# Patient Record
Sex: FEMALE | Race: WHITE | NOT HISPANIC OR LATINO | Employment: OTHER | URBAN - METROPOLITAN AREA
[De-identification: names, ages, dates, MRNs, and addresses within clinical notes are randomized per-mention and may not be internally consistent; named-entity substitution may affect disease eponyms.]

---

## 2024-01-16 ENCOUNTER — APPOINTMENT (OUTPATIENT)
Dept: CT IMAGING | Facility: HOSPITAL | Age: 59
DRG: 020 | End: 2024-01-16
Payer: COMMERCIAL

## 2024-01-16 ENCOUNTER — ANESTHESIA EVENT (OUTPATIENT)
Dept: PERIOP | Facility: HOSPITAL | Age: 59
End: 2024-01-16
Payer: COMMERCIAL

## 2024-01-16 ENCOUNTER — HOSPITAL ENCOUNTER (INPATIENT)
Facility: HOSPITAL | Age: 59
LOS: 10 days | Discharge: HOME | DRG: 020 | End: 2024-01-26
Attending: STUDENT IN AN ORGANIZED HEALTH CARE EDUCATION/TRAINING PROGRAM | Admitting: INTERNAL MEDICINE
Payer: COMMERCIAL

## 2024-01-16 ENCOUNTER — ANESTHESIA (OUTPATIENT)
Dept: PERIOP | Facility: HOSPITAL | Age: 59
End: 2024-01-16
Payer: COMMERCIAL

## 2024-01-16 ENCOUNTER — APPOINTMENT (OUTPATIENT)
Dept: GENERAL RADIOLOGY | Facility: HOSPITAL | Age: 59
DRG: 020 | End: 2024-01-16
Payer: COMMERCIAL

## 2024-01-16 DIAGNOSIS — I60.2: ICD-10-CM

## 2024-01-16 DIAGNOSIS — I60.9 SAH (SUBARACHNOID HEMORRHAGE): Primary | ICD-10-CM

## 2024-01-16 DIAGNOSIS — G91.0 COMMUNICATING HYDROCEPHALUS: ICD-10-CM

## 2024-01-16 LAB
ALBUMIN SERPL-MCNC: 4.3 G/DL (ref 3.5–5.2)
ALBUMIN/GLOB SERPL: 1.3 G/DL
ALP SERPL-CCNC: 102 U/L (ref 39–117)
ALT SERPL W P-5'-P-CCNC: 39 U/L (ref 1–33)
ANION GAP SERPL CALCULATED.3IONS-SCNC: 18 MMOL/L (ref 5–15)
APTT PPP: 29.8 SECONDS (ref 22.7–35.4)
AST SERPL-CCNC: 17 U/L (ref 1–32)
BASOPHILS # BLD AUTO: 0.02 10*3/MM3 (ref 0–0.2)
BASOPHILS NFR BLD AUTO: 0.2 % (ref 0–1.5)
BILIRUB SERPL-MCNC: 0.3 MG/DL (ref 0–1.2)
BUN SERPL-MCNC: 20 MG/DL (ref 6–20)
BUN/CREAT SERPL: 23 (ref 7–25)
CALCIUM SPEC-SCNC: 10.3 MG/DL (ref 8.6–10.5)
CHLORIDE SERPL-SCNC: 98 MMOL/L (ref 98–107)
CO2 SERPL-SCNC: 22 MMOL/L (ref 22–29)
CREAT SERPL-MCNC: 0.87 MG/DL (ref 0.57–1)
DEPRECATED RDW RBC AUTO: 37.6 FL (ref 37–54)
EGFRCR SERPLBLD CKD-EPI 2021: 76.9 ML/MIN/1.73
EOSINOPHIL # BLD AUTO: 0 10*3/MM3 (ref 0–0.4)
EOSINOPHIL NFR BLD AUTO: 0 % (ref 0.3–6.2)
ERYTHROCYTE [DISTWIDTH] IN BLOOD BY AUTOMATED COUNT: 12.4 % (ref 12.3–15.4)
GLOBULIN UR ELPH-MCNC: 3.3 GM/DL
GLUCOSE BLDC GLUCOMTR-MCNC: 115 MG/DL (ref 70–130)
GLUCOSE SERPL-MCNC: 112 MG/DL (ref 65–99)
HCT VFR BLD AUTO: 47.2 % (ref 34–46.6)
HGB BLD-MCNC: 15.7 G/DL (ref 12–15.9)
IMM GRANULOCYTES # BLD AUTO: 0.05 10*3/MM3 (ref 0–0.05)
IMM GRANULOCYTES NFR BLD AUTO: 0.4 % (ref 0–0.5)
INR PPP: 1.09 (ref 0.9–1.1)
LYMPHOCYTES # BLD AUTO: 1.3 10*3/MM3 (ref 0.7–3.1)
LYMPHOCYTES NFR BLD AUTO: 10.6 % (ref 19.6–45.3)
MCH RBC QN AUTO: 27.9 PG (ref 26.6–33)
MCHC RBC AUTO-ENTMCNC: 33.3 G/DL (ref 31.5–35.7)
MCV RBC AUTO: 83.8 FL (ref 79–97)
MONOCYTES # BLD AUTO: 0.59 10*3/MM3 (ref 0.1–0.9)
MONOCYTES NFR BLD AUTO: 4.8 % (ref 5–12)
NEUTROPHILS NFR BLD AUTO: 10.34 10*3/MM3 (ref 1.7–7)
NEUTROPHILS NFR BLD AUTO: 84 % (ref 42.7–76)
NRBC BLD AUTO-RTO: 0 /100 WBC (ref 0–0.2)
PLATELET # BLD AUTO: 373 10*3/MM3 (ref 140–450)
PMV BLD AUTO: 8.8 FL (ref 6–12)
POTASSIUM SERPL-SCNC: 3.4 MMOL/L (ref 3.5–5.2)
PROT SERPL-MCNC: 7.6 G/DL (ref 6–8.5)
PROTHROMBIN TIME: 14.2 SECONDS (ref 11.7–14.2)
RBC # BLD AUTO: 5.63 10*6/MM3 (ref 3.77–5.28)
SODIUM SERPL-SCNC: 138 MMOL/L (ref 136–145)
TROPONIN T SERPL HS-MCNC: 34 NG/L
WBC NRBC COR # BLD AUTO: 12.3 10*3/MM3 (ref 3.4–10.8)

## 2024-01-16 PROCEDURE — C1769 GUIDE WIRE: HCPCS | Performed by: RADIOLOGY

## 2024-01-16 PROCEDURE — 25010000002 PROPOFOL 10 MG/ML EMULSION: Performed by: STUDENT IN AN ORGANIZED HEALTH CARE EDUCATION/TRAINING PROGRAM

## 2024-01-16 PROCEDURE — 85025 COMPLETE CBC W/AUTO DIFF WBC: CPT | Performed by: STUDENT IN AN ORGANIZED HEALTH CARE EDUCATION/TRAINING PROGRAM

## 2024-01-16 PROCEDURE — 36415 COLL VENOUS BLD VENIPUNCTURE: CPT

## 2024-01-16 PROCEDURE — C1887 CATHETER, GUIDING: HCPCS | Performed by: RADIOLOGY

## 2024-01-16 PROCEDURE — 99291 CRITICAL CARE FIRST HOUR: CPT

## 2024-01-16 PROCEDURE — 93010 ELECTROCARDIOGRAM REPORT: CPT | Performed by: INTERNAL MEDICINE

## 2024-01-16 PROCEDURE — 25010000002 ONDANSETRON PER 1 MG: Performed by: STUDENT IN AN ORGANIZED HEALTH CARE EDUCATION/TRAINING PROGRAM

## 2024-01-16 PROCEDURE — 25010000002 DEXAMETHASONE SODIUM PHOSPHATE 20 MG/5ML SOLUTION: Performed by: STUDENT IN AN ORGANIZED HEALTH CARE EDUCATION/TRAINING PROGRAM

## 2024-01-16 PROCEDURE — 85730 THROMBOPLASTIN TIME PARTIAL: CPT | Performed by: STUDENT IN AN ORGANIZED HEALTH CARE EDUCATION/TRAINING PROGRAM

## 2024-01-16 PROCEDURE — 99223 1ST HOSP IP/OBS HIGH 75: CPT | Performed by: NURSE PRACTITIONER

## 2024-01-16 PROCEDURE — 0 IODIXANOL PER 1 ML: Performed by: RADIOLOGY

## 2024-01-16 PROCEDURE — 25010000002 HEPARIN (PORCINE) PER 1000 UNITS: Performed by: RADIOLOGY

## 2024-01-16 PROCEDURE — 75898 FOLLOW-UP ANGIOGRAPHY: CPT | Performed by: RADIOLOGY

## 2024-01-16 PROCEDURE — 25010000002 LEVETRIRACETAM PER 10 MG: Performed by: INTERNAL MEDICINE

## 2024-01-16 PROCEDURE — 03VG3DZ RESTRICTION OF INTRACRANIAL ARTERY WITH INTRALUMINAL DEVICE, PERCUTANEOUS APPROACH: ICD-10-PCS | Performed by: RADIOLOGY

## 2024-01-16 PROCEDURE — 70450 CT HEAD/BRAIN W/O DYE: CPT

## 2024-01-16 PROCEDURE — C1889 IMPLANT/INSERT DEVICE, NOC: HCPCS | Performed by: RADIOLOGY

## 2024-01-16 PROCEDURE — 93005 ELECTROCARDIOGRAM TRACING: CPT

## 2024-01-16 PROCEDURE — 75894 X-RAYS TRANSCATH THERAPY: CPT | Performed by: RADIOLOGY

## 2024-01-16 PROCEDURE — 36224 PLACE CATH CAROTD ART: CPT | Performed by: RADIOLOGY

## 2024-01-16 PROCEDURE — 25010000002 PHENYLEPHRINE 10 MG/ML SOLUTION 5 ML VIAL: Performed by: STUDENT IN AN ORGANIZED HEALTH CARE EDUCATION/TRAINING PROGRAM

## 2024-01-16 PROCEDURE — 72125 CT NECK SPINE W/O DYE: CPT

## 2024-01-16 PROCEDURE — 82948 REAGENT STRIP/BLOOD GLUCOSE: CPT

## 2024-01-16 PROCEDURE — 25010000002 NICARDIPINE 2.5 MG/ML SOLUTION: Performed by: INTERNAL MEDICINE

## 2024-01-16 PROCEDURE — 61624 TCAT PERM OCCLS/EMBOLJ CNS: CPT | Performed by: RADIOLOGY

## 2024-01-16 PROCEDURE — 70498 CT ANGIOGRAPHY NECK: CPT

## 2024-01-16 PROCEDURE — 70496 CT ANGIOGRAPHY HEAD: CPT

## 2024-01-16 PROCEDURE — 84484 ASSAY OF TROPONIN QUANT: CPT | Performed by: STUDENT IN AN ORGANIZED HEALTH CARE EDUCATION/TRAINING PROGRAM

## 2024-01-16 PROCEDURE — 25810000003 SODIUM CHLORIDE 0.9 % SOLUTION: Performed by: STUDENT IN AN ORGANIZED HEALTH CARE EDUCATION/TRAINING PROGRAM

## 2024-01-16 PROCEDURE — 25010000002 LEVETRIRACETAM PER 10 MG: Performed by: NURSE PRACTITIONER

## 2024-01-16 PROCEDURE — 76377 3D RENDER W/INTRP POSTPROCES: CPT | Performed by: RADIOLOGY

## 2024-01-16 PROCEDURE — 25010000002 NICARDIPINE 2.5 MG/ML SOLUTION: Performed by: STUDENT IN AN ORGANIZED HEALTH CARE EDUCATION/TRAINING PROGRAM

## 2024-01-16 PROCEDURE — C1894 INTRO/SHEATH, NON-LASER: HCPCS | Performed by: RADIOLOGY

## 2024-01-16 PROCEDURE — 36226 PLACE CATH VERTEBRAL ART: CPT | Performed by: RADIOLOGY

## 2024-01-16 PROCEDURE — 25010000002 KETOROLAC TROMETHAMINE PER 15 MG: Performed by: STUDENT IN AN ORGANIZED HEALTH CARE EDUCATION/TRAINING PROGRAM

## 2024-01-16 PROCEDURE — 80053 COMPREHEN METABOLIC PANEL: CPT | Performed by: STUDENT IN AN ORGANIZED HEALTH CARE EDUCATION/TRAINING PROGRAM

## 2024-01-16 PROCEDURE — 25810000003 SODIUM CHLORIDE 0.9 % SOLUTION: Performed by: INTERNAL MEDICINE

## 2024-01-16 PROCEDURE — 70486 CT MAXILLOFACIAL W/O DYE: CPT

## 2024-01-16 PROCEDURE — 25510000001 IOPAMIDOL PER 1 ML: Performed by: INTERNAL MEDICINE

## 2024-01-16 PROCEDURE — 25010000002 SUGAMMADEX 200 MG/2ML SOLUTION: Performed by: STUDENT IN AN ORGANIZED HEALTH CARE EDUCATION/TRAINING PROGRAM

## 2024-01-16 PROCEDURE — 85610 PROTHROMBIN TIME: CPT | Performed by: STUDENT IN AN ORGANIZED HEALTH CARE EDUCATION/TRAINING PROGRAM

## 2024-01-16 PROCEDURE — C1760 CLOSURE DEV, VASC: HCPCS | Performed by: RADIOLOGY

## 2024-01-16 PROCEDURE — 25810000003 SODIUM CHLORIDE 0.9 % SOLUTION 250 ML FLEX CONT: Performed by: STUDENT IN AN ORGANIZED HEALTH CARE EDUCATION/TRAINING PROGRAM

## 2024-01-16 DEVICE — IMPLANTABLE DEVICE: Type: IMPLANTABLE DEVICE | Site: BRAIN | Status: FUNCTIONAL

## 2024-01-16 RX ORDER — ACETAMINOPHEN 650 MG/1
325 SUPPOSITORY RECTAL EVERY 4 HOURS PRN
Status: DISCONTINUED | OUTPATIENT
Start: 2024-01-16 | End: 2024-01-26 | Stop reason: HOSPADM

## 2024-01-16 RX ORDER — LISINOPRIL AND HYDROCHLOROTHIAZIDE 25; 20 MG/1; MG/1
1 TABLET ORAL NIGHTLY
COMMUNITY
End: 2024-01-26 | Stop reason: HOSPADM

## 2024-01-16 RX ORDER — MELATONIN
1000 DAILY
COMMUNITY

## 2024-01-16 RX ORDER — DROPERIDOL 2.5 MG/ML
0.62 INJECTION, SOLUTION INTRAMUSCULAR; INTRAVENOUS
Status: DISCONTINUED | OUTPATIENT
Start: 2024-01-16 | End: 2024-01-16 | Stop reason: HOSPADM

## 2024-01-16 RX ORDER — PROMETHAZINE HYDROCHLORIDE 25 MG/1
25 SUPPOSITORY RECTAL ONCE AS NEEDED
Status: DISCONTINUED | OUTPATIENT
Start: 2024-01-16 | End: 2024-01-16 | Stop reason: HOSPADM

## 2024-01-16 RX ORDER — SODIUM CHLORIDE 0.9 % (FLUSH) 0.9 %
10 SYRINGE (ML) INJECTION AS NEEDED
Status: DISCONTINUED | OUTPATIENT
Start: 2024-01-16 | End: 2024-01-26 | Stop reason: HOSPADM

## 2024-01-16 RX ORDER — HYDROCODONE BITARTRATE AND ACETAMINOPHEN 7.5; 325 MG/1; MG/1
1 TABLET ORAL EVERY 4 HOURS PRN
Status: DISCONTINUED | OUTPATIENT
Start: 2024-01-16 | End: 2024-01-16 | Stop reason: HOSPADM

## 2024-01-16 RX ORDER — AMOXICILLIN 250 MG
2 CAPSULE ORAL 2 TIMES DAILY
Status: DISCONTINUED | OUTPATIENT
Start: 2024-01-16 | End: 2024-01-26 | Stop reason: HOSPADM

## 2024-01-16 RX ORDER — KETOROLAC TROMETHAMINE 15 MG/ML
15 INJECTION, SOLUTION INTRAMUSCULAR; INTRAVENOUS ONCE
Status: COMPLETED | OUTPATIENT
Start: 2024-01-16 | End: 2024-01-16

## 2024-01-16 RX ORDER — ACETAMINOPHEN 500 MG
1000 TABLET ORAL ONCE
Status: COMPLETED | OUTPATIENT
Start: 2024-01-16 | End: 2024-01-16

## 2024-01-16 RX ORDER — CETIRIZINE HYDROCHLORIDE 5 MG/1
5 TABLET ORAL DAILY
COMMUNITY

## 2024-01-16 RX ORDER — ATORVASTATIN CALCIUM 20 MG/1
20 TABLET, FILM COATED ORAL NIGHTLY
Qty: 60 TABLET | Refills: 0 | Status: DISCONTINUED | OUTPATIENT
Start: 2024-01-16 | End: 2024-01-26 | Stop reason: HOSPADM

## 2024-01-16 RX ORDER — PROMETHAZINE HYDROCHLORIDE 25 MG/1
25 TABLET ORAL ONCE AS NEEDED
Status: DISCONTINUED | OUTPATIENT
Start: 2024-01-16 | End: 2024-01-16 | Stop reason: HOSPADM

## 2024-01-16 RX ORDER — ONDANSETRON 2 MG/ML
4 INJECTION INTRAMUSCULAR; INTRAVENOUS ONCE AS NEEDED
Status: DISCONTINUED | OUTPATIENT
Start: 2024-01-16 | End: 2024-01-16 | Stop reason: HOSPADM

## 2024-01-16 RX ORDER — FENTANYL CITRATE 50 UG/ML
25 INJECTION, SOLUTION INTRAMUSCULAR; INTRAVENOUS
Status: DISCONTINUED | OUTPATIENT
Start: 2024-01-16 | End: 2024-01-16 | Stop reason: HOSPADM

## 2024-01-16 RX ORDER — SODIUM CHLORIDE 0.9 % (FLUSH) 0.9 %
10 SYRINGE (ML) INJECTION EVERY 12 HOURS SCHEDULED
Status: DISCONTINUED | OUTPATIENT
Start: 2024-01-16 | End: 2024-01-26 | Stop reason: HOSPADM

## 2024-01-16 RX ORDER — BISACODYL 5 MG/1
5 TABLET, DELAYED RELEASE ORAL DAILY PRN
Status: DISCONTINUED | OUTPATIENT
Start: 2024-01-16 | End: 2024-01-26 | Stop reason: HOSPADM

## 2024-01-16 RX ORDER — NIMODIPINE 30 MG/1
60 CAPSULE, LIQUID FILLED ORAL
Status: DISCONTINUED | OUTPATIENT
Start: 2024-01-16 | End: 2024-01-23

## 2024-01-16 RX ORDER — PHENYLEPHRINE HCL IN 0.9% NACL 1 MG/10 ML
SYRINGE (ML) INTRAVENOUS AS NEEDED
Status: DISCONTINUED | OUTPATIENT
Start: 2024-01-16 | End: 2024-01-16 | Stop reason: SURG

## 2024-01-16 RX ORDER — LEVETIRACETAM 500 MG/5ML
500 INJECTION, SOLUTION, CONCENTRATE INTRAVENOUS ONCE
Status: COMPLETED | OUTPATIENT
Start: 2024-01-16 | End: 2024-01-16

## 2024-01-16 RX ORDER — POLYETHYLENE GLYCOL 3350 17 G/17G
17 POWDER, FOR SOLUTION ORAL DAILY PRN
Status: DISCONTINUED | OUTPATIENT
Start: 2024-01-16 | End: 2024-01-26 | Stop reason: HOSPADM

## 2024-01-16 RX ORDER — ONDANSETRON 2 MG/ML
4 INJECTION INTRAMUSCULAR; INTRAVENOUS EVERY 6 HOURS PRN
Status: DISCONTINUED | OUTPATIENT
Start: 2024-01-16 | End: 2024-01-26 | Stop reason: HOSPADM

## 2024-01-16 RX ORDER — BISACODYL 10 MG
10 SUPPOSITORY, RECTAL RECTAL DAILY PRN
Status: DISCONTINUED | OUTPATIENT
Start: 2024-01-16 | End: 2024-01-26 | Stop reason: HOSPADM

## 2024-01-16 RX ORDER — SODIUM CHLORIDE 9 MG/ML
40 INJECTION, SOLUTION INTRAVENOUS AS NEEDED
Status: DISCONTINUED | OUTPATIENT
Start: 2024-01-16 | End: 2024-01-26 | Stop reason: HOSPADM

## 2024-01-16 RX ORDER — AMLODIPINE BESYLATE 5 MG/1
5 TABLET ORAL NIGHTLY
COMMUNITY
End: 2024-01-26 | Stop reason: HOSPADM

## 2024-01-16 RX ORDER — IPRATROPIUM BROMIDE AND ALBUTEROL SULFATE 2.5; .5 MG/3ML; MG/3ML
3 SOLUTION RESPIRATORY (INHALATION) ONCE AS NEEDED
Status: DISCONTINUED | OUTPATIENT
Start: 2024-01-16 | End: 2024-01-16 | Stop reason: HOSPADM

## 2024-01-16 RX ORDER — HYDRALAZINE HYDROCHLORIDE 20 MG/ML
5 INJECTION INTRAMUSCULAR; INTRAVENOUS
Status: DISCONTINUED | OUTPATIENT
Start: 2024-01-16 | End: 2024-01-16 | Stop reason: HOSPADM

## 2024-01-16 RX ORDER — UBIDECARENONE 75 MG
50 CAPSULE ORAL DAILY
COMMUNITY

## 2024-01-16 RX ORDER — ASPIRIN 81 MG/1
81 TABLET ORAL DAILY
Status: DISCONTINUED | OUTPATIENT
Start: 2024-01-16 | End: 2024-01-26 | Stop reason: HOSPADM

## 2024-01-16 RX ORDER — PROPOFOL 10 MG/ML
VIAL (ML) INTRAVENOUS AS NEEDED
Status: DISCONTINUED | OUTPATIENT
Start: 2024-01-16 | End: 2024-01-16 | Stop reason: SURG

## 2024-01-16 RX ORDER — EPHEDRINE SULFATE 50 MG/ML
5 INJECTION, SOLUTION INTRAVENOUS ONCE AS NEEDED
Status: DISCONTINUED | OUTPATIENT
Start: 2024-01-16 | End: 2024-01-16 | Stop reason: HOSPADM

## 2024-01-16 RX ORDER — ACETAMINOPHEN 325 MG/1
650 TABLET ORAL EVERY 4 HOURS PRN
Status: DISCONTINUED | OUTPATIENT
Start: 2024-01-16 | End: 2024-01-26 | Stop reason: HOSPADM

## 2024-01-16 RX ORDER — HYDROCODONE BITARTRATE AND ACETAMINOPHEN 5; 325 MG/1; MG/1
1 TABLET ORAL ONCE AS NEEDED
Status: DISCONTINUED | OUTPATIENT
Start: 2024-01-16 | End: 2024-01-16 | Stop reason: HOSPADM

## 2024-01-16 RX ORDER — IODIXANOL 320 MG/ML
300 INJECTION, SOLUTION INTRAVASCULAR
Status: COMPLETED | OUTPATIENT
Start: 2024-01-16 | End: 2024-01-16

## 2024-01-16 RX ORDER — NALOXONE HCL 0.4 MG/ML
0.2 VIAL (ML) INJECTION AS NEEDED
Status: DISCONTINUED | OUTPATIENT
Start: 2024-01-16 | End: 2024-01-16 | Stop reason: HOSPADM

## 2024-01-16 RX ORDER — ROCURONIUM BROMIDE 10 MG/ML
INJECTION, SOLUTION INTRAVENOUS AS NEEDED
Status: DISCONTINUED | OUTPATIENT
Start: 2024-01-16 | End: 2024-01-16 | Stop reason: SURG

## 2024-01-16 RX ORDER — DIPHENHYDRAMINE HYDROCHLORIDE 50 MG/ML
12.5 INJECTION INTRAMUSCULAR; INTRAVENOUS
Status: DISCONTINUED | OUTPATIENT
Start: 2024-01-16 | End: 2024-01-16 | Stop reason: HOSPADM

## 2024-01-16 RX ORDER — DEXAMETHASONE SODIUM PHOSPHATE 4 MG/ML
INJECTION, SOLUTION INTRA-ARTICULAR; INTRALESIONAL; INTRAMUSCULAR; INTRAVENOUS; SOFT TISSUE AS NEEDED
Status: DISCONTINUED | OUTPATIENT
Start: 2024-01-16 | End: 2024-01-16 | Stop reason: SURG

## 2024-01-16 RX ORDER — FLUMAZENIL 0.1 MG/ML
0.2 INJECTION INTRAVENOUS AS NEEDED
Status: DISCONTINUED | OUTPATIENT
Start: 2024-01-16 | End: 2024-01-16 | Stop reason: HOSPADM

## 2024-01-16 RX ORDER — LEVETIRACETAM 500 MG/5ML
500 INJECTION, SOLUTION, CONCENTRATE INTRAVENOUS EVERY 12 HOURS SCHEDULED
Status: DISCONTINUED | OUTPATIENT
Start: 2024-01-16 | End: 2024-01-22

## 2024-01-16 RX ORDER — ONDANSETRON 2 MG/ML
INJECTION INTRAMUSCULAR; INTRAVENOUS AS NEEDED
Status: DISCONTINUED | OUTPATIENT
Start: 2024-01-16 | End: 2024-01-16 | Stop reason: SURG

## 2024-01-16 RX ORDER — HYDROMORPHONE HYDROCHLORIDE 1 MG/ML
0.25 INJECTION, SOLUTION INTRAMUSCULAR; INTRAVENOUS; SUBCUTANEOUS
Status: DISCONTINUED | OUTPATIENT
Start: 2024-01-16 | End: 2024-01-16 | Stop reason: HOSPADM

## 2024-01-16 RX ORDER — NITROGLYCERIN 0.4 MG/1
0.4 TABLET SUBLINGUAL
Status: DISCONTINUED | OUTPATIENT
Start: 2024-01-16 | End: 2024-01-26 | Stop reason: HOSPADM

## 2024-01-16 RX ORDER — SODIUM CHLORIDE 9 MG/ML
INJECTION, SOLUTION INTRAVENOUS CONTINUOUS PRN
Status: DISCONTINUED | OUTPATIENT
Start: 2024-01-16 | End: 2024-01-16 | Stop reason: SURG

## 2024-01-16 RX ORDER — LABETALOL HYDROCHLORIDE 5 MG/ML
5 INJECTION, SOLUTION INTRAVENOUS
Status: DISCONTINUED | OUTPATIENT
Start: 2024-01-16 | End: 2024-01-16 | Stop reason: HOSPADM

## 2024-01-16 RX ADMIN — ROCURONIUM BROMIDE 50 MG: 10 INJECTION, SOLUTION INTRAVENOUS at 15:29

## 2024-01-16 RX ADMIN — LEVETIRACETAM 500 MG: 500 INJECTION, SOLUTION INTRAVENOUS at 15:59

## 2024-01-16 RX ADMIN — KETOROLAC TROMETHAMINE 15 MG: 15 INJECTION, SOLUTION INTRAMUSCULAR; INTRAVENOUS at 14:53

## 2024-01-16 RX ADMIN — Medication 300 MCG: at 16:08

## 2024-01-16 RX ADMIN — PHENYLEPHRINE HYDROCHLORIDE 0.5 MCG/KG/MIN: 10 INJECTION, SOLUTION INTRAVENOUS at 16:16

## 2024-01-16 RX ADMIN — ROCURONIUM BROMIDE 10 MG: 10 INJECTION, SOLUTION INTRAVENOUS at 16:53

## 2024-01-16 RX ADMIN — PROPOFOL 120 MG: 10 INJECTION, EMULSION INTRAVENOUS at 15:28

## 2024-01-16 RX ADMIN — ROCURONIUM BROMIDE 10 MG: 10 INJECTION, SOLUTION INTRAVENOUS at 16:11

## 2024-01-16 RX ADMIN — NICARDIPINE HYDROCHLORIDE 5 MG/HR: 25 INJECTION, SOLUTION INTRAVENOUS at 13:19

## 2024-01-16 RX ADMIN — IODIXANOL 230 ML: 320 INJECTION, SOLUTION INTRAVASCULAR at 18:03

## 2024-01-16 RX ADMIN — ATORVASTATIN CALCIUM 20 MG: 20 TABLET, FILM COATED ORAL at 22:11

## 2024-01-16 RX ADMIN — SODIUM CHLORIDE: 9 INJECTION, SOLUTION INTRAVENOUS at 15:17

## 2024-01-16 RX ADMIN — NIMODIPINE 60 MG: 30 CAPSULE, LIQUID FILLED ORAL at 14:09

## 2024-01-16 RX ADMIN — Medication 10 ML: at 20:36

## 2024-01-16 RX ADMIN — ACETAMINOPHEN 1000 MG: 500 TABLET ORAL at 14:53

## 2024-01-16 RX ADMIN — SUGAMMADEX 200 MG: 100 INJECTION, SOLUTION INTRAVENOUS at 17:34

## 2024-01-16 RX ADMIN — IOPAMIDOL 95 ML: 755 INJECTION, SOLUTION INTRAVENOUS at 14:34

## 2024-01-16 RX ADMIN — DEXAMETHASONE SODIUM PHOSPHATE 8 MG: 4 INJECTION, SOLUTION INTRAMUSCULAR; INTRAVENOUS at 15:51

## 2024-01-16 RX ADMIN — NIMODIPINE 60 MG: 30 CAPSULE, LIQUID FILLED ORAL at 20:32

## 2024-01-16 RX ADMIN — Medication 200 MCG: at 15:57

## 2024-01-16 RX ADMIN — ROCURONIUM BROMIDE 10 MG: 10 INJECTION, SOLUTION INTRAVENOUS at 17:18

## 2024-01-16 RX ADMIN — LEVETIRACETAM 500 MG: 500 INJECTION, SOLUTION INTRAVENOUS at 20:36

## 2024-01-16 RX ADMIN — ASPIRIN 81 MG: 81 TABLET, COATED ORAL at 19:06

## 2024-01-16 RX ADMIN — NICARDIPINE HYDROCHLORIDE 5 MG/HR: 25 INJECTION, SOLUTION INTRAVENOUS at 23:05

## 2024-01-16 RX ADMIN — ONDANSETRON 4 MG: 2 INJECTION INTRAMUSCULAR; INTRAVENOUS at 15:51

## 2024-01-16 RX ADMIN — PHENYLEPHRINE HYDROCHLORIDE 1.2 MCG/KG/MIN: 10 INJECTION, SOLUTION INTRAVENOUS at 16:20

## 2024-01-16 NOTE — ANESTHESIA PREPROCEDURE EVALUATION
Anesthesia Evaluation     Patient summary reviewed   NPO Solid Status: > 8 hours  NPO Liquid Status: > 2 hours           Airway   Dental      Comment: Denies any chipped, cracked, or loose teeth     Pulmonary - normal exam   Cardiovascular - normal exam    (+) hypertension      Neuro/Psych    ROS Comment: Rupture aneurysm of JUAN  subarachnoid hemorrhage  GI/Hepatic/Renal/Endo      Musculoskeletal     Abdominal    Substance History      OB/GYN          Other                          Anesthesia Plan    ASA 4 - emergent     general     (Given emergency situation, anesthesia pre operative note was performed using chart review. Informed consent was waived as well.)  intravenous induction           CODE STATUS:    Code Status (Patient has no pulse and is not breathing): CPR (Attempt to Resuscitate)  Medical Interventions (Patient has pulse or is breathing): Full Support

## 2024-01-16 NOTE — ANESTHESIA PROCEDURE NOTES
Airway  Urgency: elective    Date/Time: 1/16/2024 3:31 PM  Airway not difficult    General Information and Staff    Patient location during procedure: OR  Anesthesiologist: Jose Mcknight MD    Indications and Patient Condition  Indications for airway management: airway protection    Preoxygenated: yes  Mask difficulty assessment: 1 - vent by mask    Final Airway Details  Final airway type: endotracheal airway      Successful airway: Microcuff Subglottic Suctioning ETT  Cuffed: yes   Successful intubation technique: direct laryngoscopy  Endotracheal tube insertion site: oral  Blade: Cecil  Blade size: 3  Cormack-Lehane Classification: grade I - full view of glottis  Placement verified by: chest auscultation   Measured from: teeth  ETT/EBT  to teeth (cm): 21  Number of attempts at approach: 1  Assessment: lips, teeth, and gum same as pre-op and atraumatic intubation

## 2024-01-16 NOTE — ED NOTES
Pt to ed from PCP office.    Pt had syncopal episode on Friday night and fell into sink. Pt states before syncopal episode she had sharp pain in back and shoulders.  Pt hit head, no blood thinners.

## 2024-01-16 NOTE — BRIEF OP NOTE
POST PROCEDURE NOTE    Procedure: Cerebral Embolization    Pre-Procedure Diagnosis: SAH    Post-procedure Diagnosis: Ruptured Left Acom Aneurysm    Findings: 6 mm Acom aneurysm on the left with successful WEB placement. Post embolization occlusion achieved. Official report to follow. 8F Angioseal placed R CFA puncture site.    Complications: None    Blood loss: 150 cc    Specimen Removed: None    Disposition:   Transfer to Neuro ICU

## 2024-01-16 NOTE — ED PROVIDER NOTES
EMERGENCY DEPARTMENT ENCOUNTER  Room Number:  16/16  PCP: Alma Delia Rudolph PA-C  Independent Historians: Patient and Family      HPI:  Chief Complaint: had concerns including Syncope and Head Injury.     Context: Binta Morales is a 58 y.o. female with a medical history of hypertension who presents to the ED c/o acute headache.  Patient states starting last Friday she developed sudden onset of headache with an electrical shock sensation down her spine.  Patient states she felt she was going to vomit and so rushed to the bathroom.  Patient experienced a syncopal event where she fell and struck the right side of her head against the sink.  Patient stayed home all weekend and that with headache and nausea.  Patient was seen by primary care physician today and sent to the ER for further evaluation.  Patient currently complaining of proximal C-spine pain, right orbital pain, severe headache.      Review of prior external notes (non-ED) -and- Review of prior external test results outside of this encounter: Office visit with primary care from earlier today reviewed and notable for fall and syncopal event.  They felt patient did not look well on exam and so sent patient for further evaluation.    PAST MEDICAL HISTORY  Active Ambulatory Problems     Diagnosis Date Noted    No Active Ambulatory Problems     Resolved Ambulatory Problems     Diagnosis Date Noted    No Resolved Ambulatory Problems     No Additional Past Medical History         PAST SURGICAL HISTORY  No past surgical history on file.      FAMILY HISTORY  No family history on file.      SOCIAL HISTORY  Social History     Socioeconomic History    Marital status:          ALLERGIES  Latex and Sulfa antibiotics      REVIEW OF SYSTEMS  Review of Systems  Included in HPI  All systems reviewed and negative except for those discussed in HPI.      PHYSICAL EXAM    I have reviewed the triage vital signs and nursing notes.    ED Triage Vitals [01/16/24 1054]   Temp  Heart Rate Resp BP SpO2   99.9 °F (37.7 °C) (!) 130 20 -- 95 %      Temp src Heart Rate Source Patient Position BP Location FiO2 (%)   Tympanic Monitor -- -- --       Physical Exam  GENERAL: Mildly lethargic but alert to voice and command, no acute distress  SKIN: Warm, dry  HENT: Normocephalic, ecchymosis around the right orbit  EYES: no scleral icterus  CV: regular rhythm, regular rate  RESPIRATORY: normal effort, lungs clear  ABDOMEN: soft, nontender, nondistended  MUSCULOSKELETAL: no deformity.  Mild C-spine tenderness.  NEURO: alert, moves all extremities, follows commands      LAB RESULTS  Recent Results (from the past 24 hour(s))   ECG 12 Lead Syncope    Collection Time: 01/16/24 11:19 AM   Result Value Ref Range    QT Interval 347 ms    QTC Interval 448 ms   Comprehensive Metabolic Panel    Collection Time: 01/16/24 11:38 AM    Specimen: Blood   Result Value Ref Range    Glucose 112 (H) 65 - 99 mg/dL    BUN 20 6 - 20 mg/dL    Creatinine 0.87 0.57 - 1.00 mg/dL    Sodium 138 136 - 145 mmol/L    Potassium 3.4 (L) 3.5 - 5.2 mmol/L    Chloride 98 98 - 107 mmol/L    CO2 22.0 22.0 - 29.0 mmol/L    Calcium 10.3 8.6 - 10.5 mg/dL    Total Protein 7.6 6.0 - 8.5 g/dL    Albumin 4.3 3.5 - 5.2 g/dL    ALT (SGPT) 39 (H) 1 - 33 U/L    AST (SGOT) 17 1 - 32 U/L    Alkaline Phosphatase 102 39 - 117 U/L    Total Bilirubin 0.3 0.0 - 1.2 mg/dL    Globulin 3.3 gm/dL    A/G Ratio 1.3 g/dL    BUN/Creatinine Ratio 23.0 7.0 - 25.0    Anion Gap 18.0 (H) 5.0 - 15.0 mmol/L    eGFR 76.9 >60.0 mL/min/1.73   Single High Sensitivity Troponin T    Collection Time: 01/16/24 11:38 AM    Specimen: Blood   Result Value Ref Range    HS Troponin T 34 (H) <14 ng/L   CBC Auto Differential    Collection Time: 01/16/24 11:38 AM    Specimen: Blood   Result Value Ref Range    WBC 12.30 (H) 3.40 - 10.80 10*3/mm3    RBC 5.63 (H) 3.77 - 5.28 10*6/mm3    Hemoglobin 15.7 12.0 - 15.9 g/dL    Hematocrit 47.2 (H) 34.0 - 46.6 %    MCV 83.8 79.0 - 97.0 fL    MCH  27.9 26.6 - 33.0 pg    MCHC 33.3 31.5 - 35.7 g/dL    RDW 12.4 12.3 - 15.4 %    RDW-SD 37.6 37.0 - 54.0 fl    MPV 8.8 6.0 - 12.0 fL    Platelets 373 140 - 450 10*3/mm3    Neutrophil % 84.0 (H) 42.7 - 76.0 %    Lymphocyte % 10.6 (L) 19.6 - 45.3 %    Monocyte % 4.8 (L) 5.0 - 12.0 %    Eosinophil % 0.0 (L) 0.3 - 6.2 %    Basophil % 0.2 0.0 - 1.5 %    Immature Grans % 0.4 0.0 - 0.5 %    Neutrophils, Absolute 10.34 (H) 1.70 - 7.00 10*3/mm3    Lymphocytes, Absolute 1.30 0.70 - 3.10 10*3/mm3    Monocytes, Absolute 0.59 0.10 - 0.90 10*3/mm3    Eosinophils, Absolute 0.00 0.00 - 0.40 10*3/mm3    Basophils, Absolute 0.02 0.00 - 0.20 10*3/mm3    Immature Grans, Absolute 0.05 0.00 - 0.05 10*3/mm3    nRBC 0.0 0.0 - 0.2 /100 WBC   Protime-INR    Collection Time: 01/16/24 12:53 PM    Specimen: Blood   Result Value Ref Range    Protime 14.2 11.7 - 14.2 Seconds    INR 1.09 0.90 - 1.10   aPTT    Collection Time: 01/16/24 12:53 PM    Specimen: Blood   Result Value Ref Range    PTT 29.8 22.7 - 35.4 seconds         RADIOLOGY  CT Head Without Contrast, CT Cervical Spine Without Contrast, CT Facial Bones Without Contrast    Result Date: 1/16/2024  EMERGENCY CT SCAN OF THE HEAD FACE AND CERVICAL SPINE ON 01/16/2024  CLINICAL HISTORY: Patient had a syncopal episode Friday night 01/12/2014 during which she fell into a sink, but before syncopal episodes patient states she had sharp pain in back of head and shoulders, headache and neck pain. Patient hit head and has ecchymosis in right periorbital region.  HEAD CT TECHNIQUE: Spiral CT images were obtained from the base of the skull to the vertex without intravenous contrast. The images were reformatted and are submitted in 3 mm thick axial, sagittal and coronal CT sections with brain algorithm and 2 mm thick axial CT sections with high-resolution bone algorithm.  FINDINGS: The brain parenchyma is normal in attenuation. The lateral and third ventricles are very minimally prominent in size and the  temporal horns are slightly prominent, upper limits of normal to perhaps mildly enlarged. I see no focal mass effect and no midline shift. There is acute subarachnoid hemorrhage that fills the anterior right sylvian fissure.  It tracks along the right M1 segment and also fills the suprasellar cistern and extends along the margins of the [place] and also a small amount of subarachnoid hemorrhage tracks into the left sylvian fissure. There is some subarachnoid hemorrhage tracking into the right perimesencephalic region along the right anterior margin of the maynor and midbrain. There is a tiny amount of intraventricular hemorrhage in the fourth ventricle as well as within the posterior third ventricle and a tiny amount in the posterior tip of the trigone of the right lateral ventricle. There is a small scalp hematoma over the anterior inferior right frontal bones extending into the right periorbital region.  No underlying acute skull fracture is  identified. There is partial opacification of the ethmoid sinuses and posterior sphenoid sinuses with fluid in the posterior right maxillary sinus with fluid that is minimally hyperdense.      1. There is a small scalp hematoma over the anterior inferior right frontal bone extending into the right periorbital region and no underlying acute skull fracture is identified. 2. There is acute subarachnoid hemorrhage filling the anterior right sylvian fissure tracking adjacent to the right middle cerebral artery trifurcation and along the margins of the right M1 segment and also extending into the suprasellar cistern along the margins of the [place] and tracking along the margin of the left M1 segment into the anterior left sylvian fissure as well as tracking along the right petroclinoid ligament into the right perimesencephalic region along the anterolateral margin of the right side of the maynor and midbrain and into the prepontine cistern.  There are tiny foci of  intraventricular hemorrhage in the fourth ventricle and posterior third ventricle and posterior tip of the trigone of the right lateral ventricle. The lateral and third ventricles are very minimally prominent in size and the temporal horns are slightly prominent in size, may be upper limits of normal although very mild hydrocephalus is certainly not excluded. The pattern of the subarachnoid hemorrhage raises the potential of a ruptured aneurysm and I recommend neurosurgical consultation as well as a CT angiogram of the head and ultimately a 6 vessel cerebral arteriogram to further evaluate. 2. There is partial opacification of the ethmoid sinuses, posterior sphenoid sinuses and posterior right maxillary sinus with fluid.  The fluid in the right maxillary sinus is minimally hyperdense, could be some hemorrhagic fluid. The remainder of the head CT is within normal limits.   CT OF THE FACIAL BONES: Spiral CT images were obtained through the facial bones in the axial imaging plane and the images were reformatted and submitted in 2 mm thick axial, sagittal and coronal CT sections with soft tissue algorithm and 1 mm thick axial, sagittal and coronal CT sections with high-resolution bone algorithm.  FINDINGS: There is a small scalp hematoma over the anterior inferior right frontal bone extending into the right periorbital region. No underlying skull fracture is seen. There is partial opacification of the frontal recesses, ethmoid sinuses and posterior sphenoid sinuses with fluid as well as posterior and inferior right maxillary sinus with fluid and mild mucosal thickening in the inferior medial left maxillary sinus. There is some fluid in the superior nasal cavities. The fluid in the posterior right maxillary sinus is minimally hyperdense and may be hemorrhagic. I see no convincing acute facial fracture.  Reidentified is the subarachnoid hemorrhage in the sylvian fissures and suprasellar cistern and perimesencephalic  region.  IMPRESSION: 1. There is a small scalp hematoma over the anterior inferior right frontal bone extending into the right periorbital region. No convincing acute facial fracture is identified. There is partial opacification of the frontal recesses, ethmoid sinuses, posterior sphenoid sinuses bilaterally and posterior right maxillary sinus with fluid, and the fluid in the posterior right maxillary sinus is minimally hyperdense, may be hemorrhagic. 2. Reidentified is acute subarachnoid hemorrhage in the sylvian fissures, right greater than left, suprasellar cistern and right perimesencephalic region and a small amount of intraventricular hemorrhage in the posterior fourth ventricle as better described above. The pattern of subarachnoid hemorrhage raises the possibility of a ruptured aneurysm and neurosurgical consultation is warranted in further evaluation with CTA of the head and ultimately a 6 vessel cerebral arteriogram is recommended.   CERVICAL SPINE CT TECHNIQUE: Spiral CT images were obtained from the skull base down to the mid body of the T3 thoracic level. The images were reformatted and are submitted in 2 mm thick axial and sagittal CT sections with soft tissue algorithm and 1 mm thick axial, sagittal and coronal CT sections with high-resolution bone algorithm.  FINDINGS: The atlantooccipital articulation is within normal limits.  At C1-2 there are minimal arthritic changes at the atlantodental interval. Otherwise, the C1-2 level is normal in appearance.  At C2-3, the disc space, facets and uncovertebral joints are normal in appearance with no canal or foraminal narrowing.  At C3-4 there is mild disc space narrowing, degenerative endplate changes and minimal posterior spurring, but no canal narrowing.  There is minimal right and there is moderate left facet overgrowth and some left-sided uncovertebral joint hypertrophy and there is moderate left and no right foraminal narrowing.  At C4-5 there is disc  space narrowing and degenerative endplate changes.  There is a mild diffuse posterior disc osteophyte complex and mild canal narrowing.  There is some uncovertebral joint spurring into the foramina and there is mild left and there is mild-to-moderate right bony foraminal narrowing.  At C5-6 there is mild disc space narrowing, degenerative endplate changes, mild posterior spurring and mild canal narrowing. The facets are normal.  There is some bilateral uncovertebral joint hypertrophy and there is mild right and mild-to-moderate left bony foraminal narrowing.  At C6-7 there is mild disc space narrowing, degenerative endplate changes, mild posterior spurring and only minimal if any canal narrowing.  The facets are normal.  There is some bilateral uncovertebral joint hypertrophy and there is mild bilateral bony foraminal narrowing.  At C7-T1 the posterior endplates and facets are normal with no canal or foraminal narrowing.  No acute fracture is seen in the cervical spine.  IMPRESSION:  1. No acute fracture is seen in the cervical spine.  There is cervical spondylosis as described.  Radiation dose reduction techniques were utilized, including automated exposure control and exposure modulation based on body size.   This report was finalized on 1/16/2024 2:00 PM by Dr. Franklyn Baltazar M.D on Workstation: [id number]         MEDICATIONS GIVEN IN ER  Medications   niCARdipine (CARDENE) 25 mg in 250 mL NS infusion kit (12.5 mg/hr Intravenous Rate/Dose Change 1/16/24 1404)   niMODipine (NIMOTOP) capsule 60 mg (60 mg Oral Given 1/16/24 1409)   ketorolac (TORADOL) injection 15 mg (has no administration in time range)   acetaminophen (TYLENOL) tablet 1,000 mg (has no administration in time range)         ORDERS PLACED DURING THIS VISIT:  Orders Placed This Encounter   Procedures    CT Head Without Contrast    CT Cervical Spine Without Contrast    CT Facial Bones Without Contrast    CT Angiogram Head    CT Angiogram Neck     Comprehensive Metabolic Panel    Single High Sensitivity Troponin T    CBC Auto Differential    Protime-INR    aPTT    NPO Diet NPO Type: Sips with Meds    Keep SBP<140  Physician Communication Order    Neuro Checks    Neurosurgery (on-call MD unless specified)    Pulmonology (on-call MD unless specified)    ECG 12 Lead Syncope    Inpatient Admission    CBC & Differential         OUTPATIENT MEDICATION MANAGEMENT:  Current Facility-Administered Medications Ordered in Epic   Medication Dose Route Frequency Provider Last Rate Last Admin    acetaminophen (TYLENOL) tablet 1,000 mg  1,000 mg Oral Once Ildefonso Guillory MD        ketorolac (TORADOL) injection 15 mg  15 mg Intravenous Once Ildefonso Guillory MD        niCARdipine (CARDENE) 25 mg in 250 mL NS infusion kit  5-15 mg/hr Intravenous Titrated Ildefonso Guillory  mL/hr at 01/16/24 1404 12.5 mg/hr at 01/16/24 1404    niMODipine (NIMOTOP) capsule 60 mg  60 mg Oral Q4H Sarah Rodriguez APRN   60 mg at 01/16/24 1409     Current Outpatient Medications Ordered in Epic   Medication Sig Dispense Refill    amLODIPine (NORVASC) 5 MG tablet Take 1 tablet by mouth Every Night.      cetirizine (zyrTEC) 5 MG tablet Take 1 tablet by mouth Daily.      Cholecalciferol 25 MCG (1000 UT) tablet Take 1 tablet by mouth Daily.      lisinopril-hydrochlorothiazide (PRINZIDE,ZESTORETIC) 20-25 MG per tablet Take 1 tablet by mouth Every Night.      vitamin B-12 (CYANOCOBALAMIN) 100 MCG tablet Take 0.5 tablets by mouth Daily.           PROCEDURES  Procedures      Critical care provider statement:    Critical care time (minutes): 30-74.   Critical care time was exclusive of:  Separately billable procedures and treating other patients   Critical care was necessary to treat or prevent imminent or life-threatening deterioration of the following conditions:  CNS Failure   Critical care was time spent personally by me on the following activities:  Development of treatment plan with patient  or surrogate, discussions with consultants, evaluation of patient's response to treatment, examination of patient, obtaining history from patient or surrogate, ordering and performing treatments and interventions, ordering and review of laboratory studies, ordering and review of radiographic studies, pulse oximetry, re-evaluation of patient's condition and review of old charts. Critical Care indicators: Stroke Others: aminophylline, diazepam, glucagon, heparin, lovenox, morphine, sodium bicarbonate, et al.      PROGRESS, DATA ANALYSIS, CONSULTS, AND MEDICAL DECISION MAKING  All labs have been independently interpreted by me.  All radiology studies have been reviewed by me. All EKG's have been independently viewed and interpreted by me.  Discussion below represents my analysis of pertinent findings related to patient's condition, differential diagnosis, treatment plan and final disposition.    Differential diagnosis includes but is not limited to syncope, head injury, intracranial hemorrhage.    Clinical Scores:           Ayala and Snowden Classification: Moderate-to-severe headache, nuchal rigidity, and no neurological deficit other than possible cranial nerve palsy      ED Course as of 01/16/24 1422   Tue Jan 16, 2024   1121 EKG interpreted by me demonstrates sinus rhythm, rate of 100, no MA/QT prolongation, no ST elevation [MW]   1201 WBC(!): 12.30 [MW]   1217 HS Troponin T(!): 34 [MW]   1303 CT head interpreted by me and demonstrates findings consistent with subarachnoid hemorrhage [MW]   1303 Radiological findings discussed with Dr. Baltazar of radiology who confirms presence of subarachnoid hemorrhage with no visible facial bone fracture or C-spine fracture [MW]   1305 Patient noted to be hypertensive, nicardipine drip ordered [MW]   1324 Discussed with Oliver from neurosurgery who agrees with nicardipine drip to maintain systolic less than 140, states they will be down to evaluate patient shortly. [MW]   1413 Discussed  with Dr. Whitehead of ICU who agrees to admit [MW]   1422 Dose of Toradol and Tylenol provided for patient per neurosurgery recommendations [MW]      ED Course User Index  [MW] Ildefonso Guillory MD             AS OF 14:22 EST VITALS:    BP - 154/81  HR - 114  TEMP - 99.9 °F (37.7 °C) (Tympanic)  O2 SATS - 95%    COMPLEXITY OF CARE  The patient requires admission.      DIAGNOSIS  Final diagnoses:   SAH (subarachnoid hemorrhage)         DISPOSITION  ED Disposition       ED Disposition   Decision to Admit    Condition   --    Comment   Level of Care: Critical Care [6]   Diagnosis: SAH (subarachnoid hemorrhage) [211944]   Admitting Physician: DONNIE WHITEHEAD [[id number]]   Attending Physician: DONNIE WHITEHEAD [[id number]]   Certification: I Certify That Inpatient Hospital Services Are Medically Necessary For Greater Than 2 Midnights                  Please note that portions of this document were completed with a voice recognition program.    Note Disclaimer: At [facility], we believe that sharing information builds trust and better relationships. You are receiving this note because you recently visited [facility]. It is possible you will see health information before a provider has talked with you about it. This kind of information can be easy to misunderstand. To help you fully understand what it means for your health, we urge you to discuss this note with your provider.         Ildefonso Guillory MD  01/16/24 1413       Ildefonso Guillory MD  01/16/24 1421

## 2024-01-16 NOTE — PROGRESS NOTES
Clinical Pharmacy Services: Medication History    Binta Morales is a 58 y.o. female presenting to [facility] for   Chief Complaint   Patient presents with    Syncope    Head Injury       She  has no past medical history on file.    Allergies as of 01/16/2024 - Reviewed 01/16/2024   Allergen Reaction Noted    Latex Rash 01/16/2024    Sulfa antibiotics Rash 01/16/2024       Medication information was obtained from: Patient   Pharmacy and Phone Number:     Prior to Admission Medications       Prescriptions Last Dose Informant Patient Reported? Taking?    amLODIPine (NORVASC) 5 MG tablet 1/15/2024 Self Yes Yes    Take 1 tablet by mouth Every Night.    cetirizine (zyrTEC) 5 MG tablet 1/12/2024 Self Yes Yes    Take 1 tablet by mouth Daily.    Cholecalciferol 25 MCG (1000 UT) tablet 1/12/2024 Self Yes Yes    Take 1 tablet by mouth Daily.    lisinopril-hydrochlorothiazide (PRINZIDE,ZESTORETIC) 20-25 MG per tablet 1/15/2024 Self Yes Yes    Take 1 tablet by mouth Every Night.    vitamin B-12 (CYANOCOBALAMIN) 100 MCG tablet 1/12/2024 Self Yes Yes    Take 0.5 tablets by mouth Daily.              Medication notes:     This medication list is complete to the best of my knowledge as of 1/16/2024    Please call if questions.    Juan Back  Medication History Technician   [phone]    1/16/2024 13:21 EST

## 2024-01-16 NOTE — ED NOTES
.Nursing report ED to floor  Binta Morales  58 y.o.  female    HPI :   Chief Complaint   Patient presents with    Syncope    Head Injury       Admitting doctor:   Chris Levine MD    Admitting diagnosis:   The encounter diagnosis was SAH (subarachnoid hemorrhage).    Code status:   Current Code Status       Date Active Code Status Order ID Comments User Context       Not on file            Allergies:   Latex and Sulfa antibiotics    Isolation:   No active isolations    Intake and Output  No intake or output data in the 24 hours ending 01/16/24 1357    Weight:   There were no vitals filed for this visit.    Most recent vitals:   Vitals:    01/16/24 1251 01/16/24 1301 01/16/24 1321 01/16/24 1331   BP:  158/98  160/83   Pulse: 106 108 99 110   Resp:       Temp:       TempSrc:       SpO2: 94% 92% 96% 95%       Active LDAs/IV Access:   Lines, Drains & Airways       Active LDAs       Name Placement date Placement time Site Days    Peripheral IV 01/16/24 1319 Left Antecubital 01/16/24  1319  Antecubital  less than 1                    Labs (abnormal labs have a star):   Labs Reviewed   COMPREHENSIVE METABOLIC PANEL - Abnormal; Notable for the following components:       Result Value    Glucose 112 (*)     Potassium 3.4 (*)     ALT (SGPT) 39 (*)     Anion Gap 18.0 (*)     All other components within normal limits    Narrative:     GFR Normal >60  Chronic Kidney Disease <60  Kidney Failure <15     SINGLE HSTROPONIN T - Abnormal; Notable for the following components:    HS Troponin T 34 (*)     All other components within normal limits    Narrative:     High Sensitive Troponin T Reference Range:  <14.0 ng/L- Negative Female for AMI  <22.0 ng/L- Negative Male for AMI  >=14 - Abnormal Female indicating possible myocardial injury.  >=22 - Abnormal Male indicating possible myocardial injury.   Clinicians would have to utilize clinical acumen, EKG, Troponin, and serial changes to determine if it is an Acute Myocardial Infarction  or myocardial injury due to an underlying chronic condition.        CBC WITH AUTO DIFFERENTIAL - Abnormal; Notable for the following components:    WBC 12.30 (*)     RBC 5.63 (*)     Hematocrit 47.2 (*)     Neutrophil % 84.0 (*)     Lymphocyte % 10.6 (*)     Monocyte % 4.8 (*)     Eosinophil % 0.0 (*)     Neutrophils, Absolute 10.34 (*)     All other components within normal limits   PROTIME-INR - Normal   APTT - Normal   CBC AND DIFFERENTIAL    Narrative:     The following orders were created for panel order CBC & Differential.  Procedure                               Abnormality         Status                     ---------                               -----------         ------                     CBC Auto Differential[[id number]]        Abnormal            Final result                 Please view results for these tests on the individual orders.       EKG:   ECG 12 Lead Syncope   Preliminary Result   HEART RATE= 100  bpm   RR Interval= 600  ms   NM Interval= 173  ms   P Horizontal Axis= -24  deg   P Front Axis= 37  deg   QRSD Interval= 102  ms   QT Interval= 347  ms   QTcB= 448  ms   QRS Axis= 11  deg   T Wave Axis= 66  deg   - BORDERLINE ECG -   Sinus tachycardia   Probable left atrial enlargement   Electronically Signed By:    Date and Time of Study: 2024-01-16 11:19:36          Meds given in ED:   Medications   niCARdipine (CARDENE) 25 mg in 250 mL NS infusion kit (10 mg/hr Intravenous Rate/Dose Change 1/16/24 1336)   niMODipine (NIMOTOP) capsule 60 mg (has no administration in time range)       Imaging results:  CT Head Without Contrast    Result Date: 1/16/2024  1. There is a small scalp hematoma over the anterior inferior right frontal bone extending into the right periorbital region and no underlying acute skull fracture is identified. 2. There is acute subarachnoid hemorrhage filling the anterior right sylvian fissure tracking adjacent to the right middle cerebral artery trifurcation and along the margins  of the right M1 segment and also extending into the suprasellar cistern along the margins of the [place] and tracking along the margin of the left M1 segment into the anterior left sylvian fissure as well as tracking along the right petroclinoid ligament into the right perimesencephalic region along the anterolateral margin of the right side of the maynor and midbrain.  There are tiny foci of intraventricular hemorrhage in the fourth ventricle and posterior third ventricle and posterior tip of the trigone of the right lateral ventricle. The lateral and third ventricles are very minimally prominent in size and the temporal horns are slightly prominent in size, may be upper limits of normal although very mild hydrocephalus is certainly not excluded. The pattern of the subarachnoid hemorrhage raises the potential of a ruptured aneurysm and I recommend neurosurgical consultation as well as a CT angiogram of the head or a 6 vessel cerebral arteriogram to further evaluate. 2. There is partial opacification of the ethmoid sinuses, posterior sphenoid sinuses and posterior right maxillary sinus with fluid.  The fluid in the right maxillary sinus is minimally hyperdense, could be some hemorrhagic fluid. The remainder of the head CT is within normal limits.   CT OF THE FACIAL BONES: Spiral CT images were obtained through the facial bones in the axial imaging plane and the images were reformatted and submitted in 2 mm thick axial, sagittal and coronal CT sections with soft tissue algorithm and 1 mm thick axial, sagittal and coronal CT sections with high-resolution bone algorithm.  FINDINGS: There is a small scalp hematoma over the anterior inferior right frontal bone extending into the right periorbital region. No underlying skull fracture is seen. There is partial opacification of the frontal recesses, ethmoid sinuses and posterior sphenoid sinuses with fluid as well as posterior and inferior right maxillary sinus with  fluid and mild mucosal thickening in the inferior medial left maxillary sinus. There is some fluid in the superior nasal cavities. The fluid in the posterior right maxillary sinus is minimally hyperdense and may be hemorrhagic. I see no convincing acute facial fracture.  Reidentified is the subarachnoid hemorrhage in the sylvian fissures and suprasellar cistern and perimesencephalic region.  IMPRESSION: 1. There is a small scalp hematoma over the anterior inferior right frontal bone extending into the right periorbital region. No convincing acute facial fracture is identified. There is partial opacification of the frontal recesses, ethmoid sinuses, posterior sphenoid sinus and posterior right maxillary sinus with fluid, and fluid in the posterior right maxillary sinus is minimally hyperdense, may be hemorrhagic. 2. Reidentified is subarachnoid hemorrhage in the sylvian fissures, right greater than left, suprasellar cistern and right perimesencephalic region and a small amount of intraventricular hemorrhage in the posterior fourth ventricle as better described above.  The pattern of subarachnoid hemorrhage raises the possibility of a ruptured aneurysm and further evaluation with CTA of the head or cerebral arteriogram is recommended.   CERVICAL SPINE CT TECHNIQUE: Spiral CT images were obtained from the skull base down to the mid body of the T3 thoracic level. The images were reformatted and are submitted in 2 mm thick axial and sagittal CT sections with soft tissue algorithm and 1 mm thick axial, sagittal and coronal CT sections with high-resolution bone algorithm.  FINDINGS: The atlantooccipital articulation is within normal limits.  At C1-2 there are minimal arthritic changes at the atlantodental interval. Otherwise, the C1-2 level is normal in appearance.  At C2-3, the disc space, facets and uncovertebral joints are normal in appearance with no canal or foraminal narrowing.  At C3-4 there is mild disc space  narrowing, degenerative endplate changes and minimal posterior spurring, but no canal narrowing.  There is minimal right and there is moderate left facet overgrowth and some left-sided uncovertebral joint hypertrophy and there is moderate left and no right foraminal narrowing.  At C4-5 there is disc space narrowing and degenerative endplate changes.  There is a mild diffuse posterior disc osteophyte complex and mild canal narrowing.  There is some uncovertebral joint spurring into the foramina and there is mild left and there is mild-to-moderate right bony foraminal narrowing.  At C5-6 there is mild disc space narrowing, degenerative endplate changes, mild posterior spurring and mild canal narrowing. The facets are normal.  There is some bilateral uncovertebral joint hypertrophy and there is mild right and mild-to-moderate left bony foraminal narrowing.  At C6-7 there is mild disc space narrowing, degenerative endplate changes, mild posterior spurring and only minimal if any canal narrowing.  The facets are normal.  There is some bilateral uncovertebral joint hypertrophy and there is mild bilateral bony foraminal narrowing.  At C7-T1 the posterior endplates and facets are normal with no canal or foraminal narrowing.  No acute fracture is seen in the cervical spine.  IMPRESSION: No acute fracture is seen in the cervical spine.  There is cervical spondylosis as described.  Radiation dose reduction techniques were utilized, including automated exposure control and exposure modulation based on body size.       CT Cervical Spine Without Contrast    Result Date: 1/16/2024  1. There is a small scalp hematoma over the anterior inferior right frontal bone extending into the right periorbital region and no underlying acute skull fracture is identified. 2. There is acute subarachnoid hemorrhage filling the anterior right sylvian fissure tracking adjacent to the right middle cerebral artery trifurcation and along the margins of  the right M1 segment and also extending into the suprasellar cistern along the margins of the [place] and tracking along the margin of the left M1 segment into the anterior left sylvian fissure as well as tracking along the right petroclinoid ligament into the right perimesencephalic region along the anterolateral margin of the right side of the maynor and midbrain.  There are tiny foci of intraventricular hemorrhage in the fourth ventricle and posterior third ventricle and posterior tip of the trigone of the right lateral ventricle. The lateral and third ventricles are very minimally prominent in size and the temporal horns are slightly prominent in size, may be upper limits of normal although very mild hydrocephalus is certainly not excluded. The pattern of the subarachnoid hemorrhage raises the potential of a ruptured aneurysm and I recommend neurosurgical consultation as well as a CT angiogram of the head or a 6 vessel cerebral arteriogram to further evaluate. 2. There is partial opacification of the ethmoid sinuses, posterior sphenoid sinuses and posterior right maxillary sinus with fluid.  The fluid in the right maxillary sinus is minimally hyperdense, could be some hemorrhagic fluid. The remainder of the head CT is within normal limits.   CT OF THE FACIAL BONES: Spiral CT images were obtained through the facial bones in the axial imaging plane and the images were reformatted and submitted in 2 mm thick axial, sagittal and coronal CT sections with soft tissue algorithm and 1 mm thick axial, sagittal and coronal CT sections with high-resolution bone algorithm.  FINDINGS: There is a small scalp hematoma over the anterior inferior right frontal bone extending into the right periorbital region. No underlying skull fracture is seen. There is partial opacification of the frontal recesses, ethmoid sinuses and posterior sphenoid sinuses with fluid as well as posterior and inferior right maxillary sinus with  narrowing, degenerative endplate changes and minimal posterior spurring, but no canal narrowing.  There is minimal right and there is moderate left facet overgrowth and some left-sided uncovertebral joint hypertrophy and there is moderate left and no right foraminal narrowing.  At C4-5 there is disc space narrowing and degenerative endplate changes.  There is a mild diffuse posterior disc osteophyte complex and mild canal narrowing.  There is some uncovertebral joint spurring into the foramina and there is mild left and there is mild-to-moderate right bony foraminal narrowing.  At C5-6 there is mild disc space narrowing, degenerative endplate changes, mild posterior spurring and mild canal narrowing. The facets are normal.  There is some bilateral uncovertebral joint hypertrophy and there is mild right and mild-to-moderate left bony foraminal narrowing.  At C6-7 there is mild disc space narrowing, degenerative endplate changes, mild posterior spurring and only minimal if any canal narrowing.  The facets are normal.  There is some bilateral uncovertebral joint hypertrophy and there is mild bilateral bony foraminal narrowing.  At C7-T1 the posterior endplates and facets are normal with no canal or foraminal narrowing.  No acute fracture is seen in the cervical spine.  IMPRESSION: No acute fracture is seen in the cervical spine.  There is cervical spondylosis as described.  Radiation dose reduction techniques were utilized, including automated exposure control and exposure modulation based on body size.       CT Facial Bones Without Contrast    Result Date: 1/16/2024  1. There is a small scalp hematoma over the anterior inferior right frontal bone extending into the right periorbital region and no underlying acute skull fracture is identified. 2. There is acute subarachnoid hemorrhage filling the anterior right sylvian fissure tracking adjacent to the right middle cerebral artery trifurcation and along the margins of  the right M1 segment and also extending into the suprasellar cistern along the margins of the [place] and tracking along the margin of the left M1 segment into the anterior left sylvian fissure as well as tracking along the right petroclinoid ligament into the right perimesencephalic region along the anterolateral margin of the right side of the maynor and midbrain.  There are tiny foci of intraventricular hemorrhage in the fourth ventricle and posterior third ventricle and posterior tip of the trigone of the right lateral ventricle. The lateral and third ventricles are very minimally prominent in size and the temporal horns are slightly prominent in size, may be upper limits of normal although very mild hydrocephalus is certainly not excluded. The pattern of the subarachnoid hemorrhage raises the potential of a ruptured aneurysm and I recommend neurosurgical consultation as well as a CT angiogram of the head or a 6 vessel cerebral arteriogram to further evaluate. 2. There is partial opacification of the ethmoid sinuses, posterior sphenoid sinuses and posterior right maxillary sinus with fluid.  The fluid in the right maxillary sinus is minimally hyperdense, could be some hemorrhagic fluid. The remainder of the head CT is within normal limits.   CT OF THE FACIAL BONES: Spiral CT images were obtained through the facial bones in the axial imaging plane and the images were reformatted and submitted in 2 mm thick axial, sagittal and coronal CT sections with soft tissue algorithm and 1 mm thick axial, sagittal and coronal CT sections with high-resolution bone algorithm.  FINDINGS: There is a small scalp hematoma over the anterior inferior right frontal bone extending into the right periorbital region. No underlying skull fracture is seen. There is partial opacification of the frontal recesses, ethmoid sinuses and posterior sphenoid sinuses with fluid as well as posterior and inferior right maxillary sinus with  narrowing, degenerative endplate changes and minimal posterior spurring, but no canal narrowing.  There is minimal right and there is moderate left facet overgrowth and some left-sided uncovertebral joint hypertrophy and there is moderate left and no right foraminal narrowing.  At C4-5 there is disc space narrowing and degenerative endplate changes.  There is a mild diffuse posterior disc osteophyte complex and mild canal narrowing.  There is some uncovertebral joint spurring into the foramina and there is mild left and there is mild-to-moderate right bony foraminal narrowing.  At C5-6 there is mild disc space narrowing, degenerative endplate changes, mild posterior spurring and mild canal narrowing. The facets are normal.  There is some bilateral uncovertebral joint hypertrophy and there is mild right and mild-to-moderate left bony foraminal narrowing.  At C6-7 there is mild disc space narrowing, degenerative endplate changes, mild posterior spurring and only minimal if any canal narrowing.  The facets are normal.  There is some bilateral uncovertebral joint hypertrophy and there is mild bilateral bony foraminal narrowing.  At C7-T1 the posterior endplates and facets are normal with no canal or foraminal narrowing.  No acute fracture is seen in the cervical spine.  IMPRESSION: No acute fracture is seen in the cervical spine.  There is cervical spondylosis as described.  Radiation dose reduction techniques were utilized, including automated exposure control and exposure modulation based on body size.        Ambulatory status:   - BR    Social issues:   Social History     Socioeconomic History    Marital status:         John Wan RN  01/16/24 13:57 EST

## 2024-01-16 NOTE — CONSULTS
"[facility] NEUROSURGERY CONSULT NOTE    Patient name: Binta Morales  Referring Provider: DR. Guillory  Reason for Consultation: Subarachnoid hemorrhage    Patient Care Team:  Alma Delia Rudolph PA-C as PCP - General (Physician Assistant)    Chief complaint: Headache    Subjective .     History of present illness:    Patient is a 58 y.o.  female presents to ER at the behest of her family doctor after evaluation for acute headache.  Patient reports 4 days ago had acute on state of low back pain that shot up her spine to her neck that was associated with nausea and vomiting.  She passed out in the bathroom and hit her head.  Her  found her on the floor.  They feel like it was only about an hour's time or less since she was last seen.  Her  states that she did \"soil her clothing\" a little.  Since Friday she has had a persistent throbbing 8/10 headache with diminished appetite and nausea.  She also reports some intermittent double vision.  No numbness tingling or weakness.  She also has a stiff neck.  She has hypertension for which she reports compliance with her blood pressure medications at home.  No cancer history.  Non-smoker.  No illicit drug use.  No family history of aneurysm.    Review of Systems  Review of Systems   Constitutional:  Positive for appetite change. Negative for fever.   HENT:  Negative for trouble swallowing.    Eyes:  Positive for photophobia and visual disturbance.   Respiratory:  Negative for shortness of breath.    Cardiovascular:  Negative for chest pain.   Gastrointestinal:  Positive for nausea and vomiting.   Genitourinary:  Positive for enuresis.   Musculoskeletal:  Positive for neck pain and neck stiffness.   Neurological:  Positive for headaches. Negative for weakness and numbness.   Hematological:  Bruises/bleeds easily.   Psychiatric/Behavioral:  Negative for confusion.        History  PAST MEDICAL HISTORY  Past Medical History:   Diagnosis Date    Hypertension        PAST " SURGICAL HISTORY  History reviewed. No pertinent surgical history.    FAMILY HISTORY  Family History   Problem Relation Age of Onset    Cerebral aneurysm Neg Hx        SOCIAL HISTORY  Social History     Tobacco Use    Smoking status: Never   Substance Use Topics    Alcohol use: Yes     Comment: rare, social    Drug use: Never       full time job doing works at gift [employer]  Lives with     Allergies:  Latex and Sulfa antibiotics    MEDICATIONS:  Medications Prior to Admission   Medication Sig Dispense Refill Last Dose    amLODIPine (NORVASC) 5 MG tablet Take 1 tablet by mouth Every Night.   1/15/2024    cetirizine (zyrTEC) 5 MG tablet Take 1 tablet by mouth Daily.   1/12/2024    Cholecalciferol 25 MCG (1000 UT) tablet Take 1 tablet by mouth Daily.   1/12/2024    lisinopril-hydrochlorothiazide (PRINZIDE,ZESTORETIC) 20-25 MG per tablet Take 1 tablet by mouth Every Night.   1/15/2024    vitamin B-12 (CYANOCOBALAMIN) 100 MCG tablet Take 0.5 tablets by mouth Daily.   1/12/2024       Objective     Results Review:  LABS:  Results from last 7 days   Lab Units 01/16/24  1138   WBC 10*3/mm3 12.30*   HEMOGLOBIN g/dL 15.7   HEMATOCRIT % 47.2*   PLATELETS 10*3/mm3 373     Results from last 7 days   Lab Units 01/16/24  1138   SODIUM mmol/L 138   POTASSIUM mmol/L 3.4*   CHLORIDE mmol/L 98   CO2 mmol/L 22.0   BUN mg/dL 20   CREATININE mg/dL 0.87   CALCIUM mg/dL 10.3   BILIRUBIN mg/dL 0.3   ALK PHOS U/L 102   ALT (SGPT) U/L 39*   AST (SGOT) U/L 17   GLUCOSE mg/dL 112*     Results from last 7 days   Lab Units 01/16/24  1253   INR  1.09         DIAGNOSTICS:  CT head-Acute subarachnoid hemorrhage filling the right sylvian fissure with globular focus and adjacent to the right MCA trifurcation and extending into the suprasellar cisterns along the margin of the [place].  Also tracks along the margin of the left M1 segment into the anterior left sylvian fissure and into the right perimesencephalic region.  There is foci  of intraventricular hemorrhage within the fourth ventricle.  There is minimal prominence of the lateral and third ventricles as well as the temporal horns.    Results Review:   I reviewed the patient's new clinical results.  I personally viewed and interpreted the patient's CT head.  Also reviewed by discussed with Dr. Gamble    Vital Signs   Temp:  [99.9 °F (37.7 °C)] 99.9 °F (37.7 °C)  Heart Rate:  [] 110  Resp:  [20] 20  BP: (132-160)/(73-98) 132/73    Physical Exam:  Physical Exam  Vitals reviewed.   Constitutional:       Appearance: Normal appearance.   Pulmonary:      Effort: Pulmonary effort is normal.   Musculoskeletal:      Cervical back: Rigidity present. Pain with movement present. Decreased range of motion.   Skin:     General: Skin is warm and dry.   Neurological:      General: No focal deficit present.      Mental Status: She is alert and oriented to person, place, and time.      Cranial Nerves: Cranial nerves 2-12 are intact.      Motor: Motor strength is normal.     Coordination: Finger-Nose-Finger Test and Heel to Shin Test normal.   Psychiatric:         Mood and Affect: Mood normal.         Speech: Speech normal.         Thought Content: Thought content normal.       Neurologic Exam     Mental Status   Oriented to person, place, and time.   Follows 3 step commands.   Attention: normal. Concentration: normal.   Speech: speech is normal   Level of consciousness: alert  Knowledge: good.   Normal comprehension.     Cranial Nerves   Cranial nerves II through XII intact.     Motor Exam   Right arm pronator drift: absent  Left arm pronator drift: absent    Strength   Strength 5/5 throughout.     Sensory Exam   Light touch normal.     Gait, Coordination, and Reflexes     Gait  Gait: (Not tested due to acute hemorrhage)    Coordination   Finger to nose coordination: normal  Heel to shin coordination: normal      Assessment & Plan       SAH (subarachnoid hemorrhage)    Ruptured aneurysm of anterior  communicating artery    Communicating hydrocephalus    Patient presents with acute onset of spine pain radiating to the neck followed by persistent headache nausea and double vision.  This occurred 4 days ago and she presented to the hospital today at the behest of her family provider.  Imaging revealed subarachnoid hemorrhage.  CTA head and neck pending to evaluate for aneurysm.  Patient with neck rigidity, right periorbital ecchymosis, but no focal neurologic deficits.  Will initiate standard subarachnoid hemorrhage treatment with [place], simvastatin, Keppra.  If aneurysm found, will anticipate cerebral angiogram and possible embolization today.  She does have very minimal hydrocephalus on CT due to intraventricular hemorrhage within the fourth ventricle.  Will need to watch for progressive hydrocephalus although patient is already 4 days out and is not exhibiting any significant issues.    Plan:   CTA head and neck stat  N.p.o.  Nimotop 60 mg p.o. every 4 hours scheduled  Simvastatin 20 mg p.o. daily  Keppra 500 mg IV twice daily  Keep SBP less than 140-patient on Cardene    The risks, benefits and alternatives of cerebral angiography were explained in detail to the patient. The alternative is not to undergo this procedure. The benefit of cerebral angiography should be, though is not guaranteed, the elucidation of the vascular anatomy of the brain and the blood vessels leading to the brain. The risks of the procedure include, but are not limited to, the possibility of stroke, bleeding, damage to an artery in the brain, groin or neck(possibly requiring surgery to correct), infection, reaction to the contrast dye resulting in itching, swelling, anaphylaxis or even death, lack of ability to perform the procedure, need for further operative intervention, blindness, etcetera. The patient voiced understanding of the risks, benefits and alternatives and requests that we proceed with cerebral angiography.    Addendum:  "CTA revealed ruptured ACOM aneurysm.  Patient going to OR for cerebral angiogram and embolization with Dr. Casillas.  Discussed with  who is aware and present in OR waiting area.  Dr. Gamble will speak with him.  Repeat CT head in morning and monitor for development of hydrocephalus.    I discussed the patient's findings and my recommendations with patient, family, and Dr. Guillory and Dr. Casillas    During patient visit, I utilized appropriate personal protective equipment including gloves and mask.  Mask used was standard procedure mask. Appropriate PPE was worn during the entire visit.  Hand hygiene was completed before and after.     Sarah Rodriguez, APRN  01/16/24  15:51 EST    \"Dictated utilizing Dragon dictation\".      "

## 2024-01-16 NOTE — H&P
History and Physical    Patient Name: Binta Morales  Age/Sex: 58 y.o. female  : 1965  MRN: [id number]    Date of Admission: 2024  Date of Encounter Visit: 24  Encounter Provider: Chris Levine MD  Place of Service: [facility]   Patient Care Team:  Alma Delia Rudolph PA-C as PCP - General (Physician Assistant)      Subjective:     Chief Complaint: Headache    History of Present Illness:  Binta Morales is a 58 y.o. female with history of hypertension, on amlodipine and lisinopril/hydrochlorothiazide.  She fell her head hit the toilet this past Friday which was associated with some loss of consciousness for short period of time.  Patient did not seek any attention and did not have any head imaging.  That incident was caused by a sharp headache even before she fell with some electrical sensation that went down her spine and this when she fell down and hit her head but she is not sure if she fainted and fell down or whether she fainted after she had the head injury.  She stayed home all weekend because of that and was complaining of headache and nausea the whole time and the symptoms got worse so she decided to come to the ER today.  CT of the head showed extensive subarachnoid hemorrhage, patient went for CT angiogram for further evaluation after she was seen in consultation by neurosurgery  Neurosurgery elected to control the blood pressure she was started on the Cardene with good response, her systolic blood pressure was 135 at the time of my assessment, they wanted to avoid any narcotics.  Patient is not on any aspirin, she does take some [place] and ibuprofen  for arthritis.  Not on any anticoagulation cyanosis of any palpitation or atrial fibrillation or arrhythmia  History of any smoking or asthma or tobacco abuse or COPD or any respiratory issues  Patient was supposed to be admitted to the ICU however her CT angiogram showed anterior communicating and patient had to go to the operating room for  "immediate surgical intervention.  Pulmonary Functions Testing Results:    No results found for: \"FEV1\", \"FVC\", \"[id number]\", \"TLC\", \"DLCO\"    Review of Systems:   Review of Systems  As per above    Past Medical History:  History reviewed. No pertinent past medical history.    History reviewed. No pertinent surgical history.    Home Medications:   Medications Prior to Admission   Medication Sig Dispense Refill Last Dose    amLODIPine (NORVASC) 5 MG tablet Take 1 tablet by mouth Every Night.   1/15/2024    cetirizine (zyrTEC) 5 MG tablet Take 1 tablet by mouth Daily.   1/12/2024    Cholecalciferol 25 MCG (1000 UT) tablet Take 1 tablet by mouth Daily.   1/12/2024    lisinopril-hydrochlorothiazide (PRINZIDE,ZESTORETIC) 20-25 MG per tablet Take 1 tablet by mouth Every Night.   1/15/2024    vitamin B-12 (CYANOCOBALAMIN) 100 MCG tablet Take 0.5 tablets by mouth Daily.   1/12/2024       Inpatient Medications:  Scheduled Meds:niMODipine, 60 mg, Oral, Q4H      Continuous Infusions:niCARdipine, 5-15 mg/hr, Last Rate: 12.5 mg/hr (01/16/24 1404)      PRN Meds:.    Allergies:  Allergies   Allergen Reactions    Latex Rash    Sulfa Antibiotics Rash       Past Social History:  Social History     Socioeconomic History    Marital status:        Past Family History:  History reviewed. No pertinent family history.        Objective:   Temp:  [99.9 °F (37.7 °C)] 99.9 °F (37.7 °C)  Heart Rate:  [] 110  Resp:  [20] 20  BP: (132-160)/(73-98) 132/73   SpO2:  [92 %-96 %] 95 %  on    Device (Oxygen Therapy): room air  No intake or output data in the 24 hours ending 01/16/24 1519  There is no height or weight on file to calculate BMI.  There were no vitals filed for this visit.  Weight change:     Physical Exam:   Physical Exam   General:    No acute distress, alert and oriented x4, pleasant                   Head:    Normocephalic, she has a raccoon eye around the right orbit   Eyes:          Conjunctivae and sclerae normal, no " "icterus, PERRLA   Throat:   No oral lesions, no thrush, oral mucosa moist.    Neck:   Supple, trachea midline.   Lungs:     Normal chest on inspection, CTAB, no wheezes. No rhonchi. No crackles. Respirations regular, even and unlabored.     Heart:    Regular rhythm and normal rate.  No murmurs, gallops, or rubs noted.   Abdomen:     Soft, non-tender, non-distended, positive bowel sounds.    Extremities:   No clubbing, cyanosis, or edema.     Pulses:   Pulses palpable and equal bilaterally.    Skin:   No bleeding or rash.   Neuro:   Non-focal.  Moves all extremities well.    Psychiatric:   Normal mood and affect.     Lab Review:   Results from last 7 days   Lab Units 01/16/24  1138   SODIUM mmol/L 138   POTASSIUM mmol/L 3.4*   CHLORIDE mmol/L 98   CO2 mmol/L 22.0   BUN mg/dL 20   CREATININE mg/dL 0.87   GLUCOSE mg/dL 112*   CALCIUM mg/dL 10.3   AST (SGOT) U/L 17   ALT (SGPT) U/L 39*   ALBUMIN g/dL 4.3     Results from last 7 days   Lab Units 01/16/24  1138   HSTROP T ng/L 34*     Results from last 7 days   Lab Units 01/16/24  1138   WBC 10*3/mm3 12.30*   HEMOGLOBIN g/dL 15.7   HEMATOCRIT % 47.2*   PLATELETS 10*3/mm3 373   MCV fL 83.8   MCH pg 27.9   MCHC g/dL 33.3   RDW % 12.4   RDW-SD fl 37.6   MPV fL 8.8   NEUTROPHIL % % 84.0*   LYMPHOCYTE % % 10.6*   MONOCYTES % % 4.8*   EOSINOPHIL % % 0.0*   BASOPHIL % % 0.2   IMM GRAN % % 0.4   NEUTROS ABS 10*3/mm3 10.34*   LYMPHS ABS 10*3/mm3 1.30   MONOS ABS 10*3/mm3 0.59   EOS ABS 10*3/mm3 0.00   BASOS ABS 10*3/mm3 0.02   IMMATURE GRANS (ABS) 10*3/mm3 0.05   NRBC /100 WBC 0.0     Results from last 7 days   Lab Units 01/16/24  1253   INR  1.09   APTT seconds 29.8               Invalid input(s): \"LDLCALC\"                                            Imaging:  Imaging Results (Most Recent)       Procedure Component Value Units Date/Time    CT Angiogram Head [[id number]] Resulted: 01/16/24 1434     Updated: 01/16/24 1445    CT Angiogram Neck [[id number]] Resulted: 01/16/24 1503     " Updated: 01/16/24 1445    CT Head Without Contrast [[id number]] Collected: 01/16/24 1315     Updated: 01/16/24 1403    Narrative:      EMERGENCY CT SCAN OF THE HEAD FACE AND CERVICAL SPINE ON 01/16/2024     CLINICAL HISTORY: Patient had a syncopal episode Friday night 01/12/2014  during which she fell into a sink, but before syncopal episodes patient  states she had sharp pain in back of head and shoulders, headache and  neck pain. Patient hit head and has ecchymosis in right periorbital  region.      HEAD CT TECHNIQUE: Spiral CT images were obtained from the base of the  skull to the vertex without intravenous contrast. The images were  reformatted and are submitted in 3 mm thick axial, sagittal and coronal  CT sections with brain algorithm and 2 mm thick axial CT sections with  high-resolution bone algorithm.      FINDINGS: The brain parenchyma is normal in attenuation. The lateral and  third ventricles are very minimally prominent in size and the temporal  horns are slightly prominent, upper limits of normal to perhaps mildly  enlarged. I see no focal mass effect and no midline shift. There is  acute subarachnoid hemorrhage that fills the anterior right sylvian  fissure.  It tracks along the right M1 segment and also fills the  suprasellar cistern and extends along the margins of the [place] and also a small amount of subarachnoid hemorrhage tracks into  the left sylvian fissure. There is some subarachnoid hemorrhage tracking  into the right perimesencephalic region along the right anterior margin  of the maynor and midbrain. There is a tiny amount of intraventricular  hemorrhage in the fourth ventricle as well as within the posterior third  ventricle and a tiny amount in the posterior tip of the trigone of the  right lateral ventricle. There is a small scalp hematoma over the  anterior inferior right frontal bones extending into the right  periorbital region.  No underlying acute skull fracture is   identified.  There is partial opacification of the ethmoid sinuses and posterior  sphenoid sinuses with fluid in the posterior right maxillary sinus with  fluid that is minimally hyperdense.       Impression:      1. There is a small scalp hematoma over the anterior inferior right  frontal bone extending into the right periorbital region and no  underlying acute skull fracture is identified.   2. There is acute subarachnoid hemorrhage filling the anterior right  sylvian fissure tracking adjacent to the right middle cerebral artery  trifurcation and along the margins of the right M1 segment and also  extending into the suprasellar cistern along the margins of the [place] and tracking along the margin of the left M1 segment into the  anterior left sylvian fissure as well as tracking along the right  petroclinoid ligament into the right perimesencephalic region along the  anterolateral margin of the right side of the maynor and midbrain and into  the prepontine cistern.  There are tiny foci of intraventricular  hemorrhage in the fourth ventricle and posterior third ventricle and  posterior tip of the trigone of the right lateral ventricle. The lateral  and third ventricles are very minimally prominent in size and the  temporal horns are slightly prominent in size, may be upper limits of  normal although very mild hydrocephalus is certainly not excluded. The  pattern of the subarachnoid hemorrhage raises the potential of a  ruptured aneurysm and I recommend neurosurgical consultation as well as  a CT angiogram of the head and ultimately a 6 vessel cerebral  arteriogram to further evaluate.   2. There is partial opacification of the ethmoid sinuses, posterior  sphenoid sinuses and posterior right maxillary sinus with fluid.  The  fluid in the right maxillary sinus is minimally hyperdense, could be  some hemorrhagic fluid. The remainder of the head CT is within normal  limits.        CT OF THE FACIAL BONES: Spiral  CT images were obtained through the  facial bones in the axial imaging plane and the images were reformatted  and submitted in 2 mm thick axial, sagittal and coronal CT sections with  soft tissue algorithm and 1 mm thick axial, sagittal and coronal CT  sections with high-resolution bone algorithm.      FINDINGS: There is a small scalp hematoma over the anterior inferior  right frontal bone extending into the right periorbital region. No  underlying skull fracture is seen. There is partial opacification of the  frontal recesses, ethmoid sinuses and posterior sphenoid sinuses with  fluid as well as posterior and inferior right maxillary sinus with fluid  and mild mucosal thickening in the inferior medial left maxillary sinus.  There is some fluid in the superior nasal cavities. The fluid in the  posterior right maxillary sinus is minimally hyperdense and may be  hemorrhagic. I see no convincing acute facial fracture.  Reidentified is  the subarachnoid hemorrhage in the sylvian fissures and suprasellar  cistern and perimesencephalic region.     IMPRESSION:  1. There is a small scalp hematoma over the anterior inferior right  frontal bone extending into the right periorbital region. No convincing  acute facial fracture is identified. There is partial opacification of  the frontal recesses, ethmoid sinuses, posterior sphenoid sinuses  bilaterally and posterior right maxillary sinus with fluid, and the  fluid in the posterior right maxillary sinus is minimally hyperdense,  may be hemorrhagic.   2. Reidentified is acute subarachnoid hemorrhage in the sylvian  fissures, right greater than left, suprasellar cistern and right  perimesencephalic region and a small amount of intraventricular  hemorrhage in the posterior fourth ventricle as better described above.   The pattern of subarachnoid hemorrhage raises the possibility of a  ruptured aneurysm and neurosurgical consultation is warranted in further  evaluation with CTA of  the head and ultimately a 6 vessel cerebral  arteriogram is recommended.        CERVICAL SPINE CT TECHNIQUE: Spiral CT images were obtained from the  skull base down to the mid body of the T3 thoracic level. The images  were reformatted and are submitted in 2 mm thick axial and sagittal CT  sections with soft tissue algorithm and 1 mm thick axial, sagittal and  coronal CT sections with high-resolution bone algorithm.      FINDINGS: The atlantooccipital articulation is within normal limits.      At C1-2 there are minimal arthritic changes at the atlantodental  interval. Otherwise, the C1-2 level is normal in appearance.     At C2-3, the disc space, facets and uncovertebral joints are normal in  appearance with no canal or foraminal narrowing.      At C3-4 there is mild disc space narrowing, degenerative endplate  changes and minimal posterior spurring, but no canal narrowing.  There  is minimal right and there is moderate left facet overgrowth and some  left-sided uncovertebral joint hypertrophy and there is moderate left  and no right foraminal narrowing.      At C4-5 there is disc space narrowing and degenerative endplate changes.   There is a mild diffuse posterior disc osteophyte complex and mild  canal narrowing.  There is some uncovertebral joint spurring into the  foramina and there is mild left and there is mild-to-moderate right bony  foraminal narrowing.      At C5-6 there is mild disc space narrowing, degenerative endplate  changes, mild posterior spurring and mild canal narrowing. The facets  are normal.  There is some bilateral uncovertebral joint hypertrophy and  there is mild right and mild-to-moderate left bony foraminal narrowing.      At C6-7 there is mild disc space narrowing, degenerative endplate  changes, mild posterior spurring and only minimal if any canal  narrowing.  The facets are normal.  There is some bilateral  uncovertebral joint hypertrophy and there is mild bilateral bony  foraminal  narrowing.      At C7-T1 the posterior endplates and facets are normal with no canal or  foraminal narrowing.      No acute fracture is seen in the cervical spine.     IMPRESSION:     1. No acute fracture is seen in the cervical spine.  There is cervical  spondylosis as described.      Radiation dose reduction techniques were utilized, including automated  exposure control and exposure modulation based on body size.        This report was finalized on 1/16/2024 2:00 PM by Dr. Franklyn Baltazar M.D  on Workstation: [id number]       CT Cervical Spine Without Contrast [[id number]] Collected: 01/16/24 1315     Updated: 01/16/24 1403    Narrative:      EMERGENCY CT SCAN OF THE HEAD FACE AND CERVICAL SPINE ON 01/16/2024     CLINICAL HISTORY: Patient had a syncopal episode Friday night 01/12/2014  during which she fell into a sink, but before syncopal episodes patient  states she had sharp pain in back of head and shoulders, headache and  neck pain. Patient hit head and has ecchymosis in right periorbital  region.      HEAD CT TECHNIQUE: Spiral CT images were obtained from the base of the  skull to the vertex without intravenous contrast. The images were  reformatted and are submitted in 3 mm thick axial, sagittal and coronal  CT sections with brain algorithm and 2 mm thick axial CT sections with  high-resolution bone algorithm.      FINDINGS: The brain parenchyma is normal in attenuation. The lateral and  third ventricles are very minimally prominent in size and the temporal  horns are slightly prominent, upper limits of normal to perhaps mildly  enlarged. I see no focal mass effect and no midline shift. There is  acute subarachnoid hemorrhage that fills the anterior right sylvian  fissure.  It tracks along the right M1 segment and also fills the  suprasellar cistern and extends along the margins of the [place] and also a small amount of subarachnoid hemorrhage tracks into  the left sylvian fissure. There is some  subarachnoid hemorrhage tracking  into the right perimesencephalic region along the right anterior margin  of the maynor and midbrain. There is a tiny amount of intraventricular  hemorrhage in the fourth ventricle as well as within the posterior third  ventricle and a tiny amount in the posterior tip of the trigone of the  right lateral ventricle. There is a small scalp hematoma over the  anterior inferior right frontal bones extending into the right  periorbital region.  No underlying acute skull fracture is  identified.  There is partial opacification of the ethmoid sinuses and posterior  sphenoid sinuses with fluid in the posterior right maxillary sinus with  fluid that is minimally hyperdense.       Impression:      1. There is a small scalp hematoma over the anterior inferior right  frontal bone extending into the right periorbital region and no  underlying acute skull fracture is identified.   2. There is acute subarachnoid hemorrhage filling the anterior right  sylvian fissure tracking adjacent to the right middle cerebral artery  trifurcation and along the margins of the right M1 segment and also  extending into the suprasellar cistern along the margins of the [place] and tracking along the margin of the left M1 segment into the  anterior left sylvian fissure as well as tracking along the right  petroclinoid ligament into the right perimesencephalic region along the  anterolateral margin of the right side of the maynor and midbrain and into  the prepontine cistern.  There are tiny foci of intraventricular  hemorrhage in the fourth ventricle and posterior third ventricle and  posterior tip of the trigone of the right lateral ventricle. The lateral  and third ventricles are very minimally prominent in size and the  temporal horns are slightly prominent in size, may be upper limits of  normal although very mild hydrocephalus is certainly not excluded. The  pattern of the subarachnoid hemorrhage raises the  potential of a  ruptured aneurysm and I recommend neurosurgical consultation as well as  a CT angiogram of the head and ultimately a 6 vessel cerebral  arteriogram to further evaluate.   2. There is partial opacification of the ethmoid sinuses, posterior  sphenoid sinuses and posterior right maxillary sinus with fluid.  The  fluid in the right maxillary sinus is minimally hyperdense, could be  some hemorrhagic fluid. The remainder of the head CT is within normal  limits.        CT OF THE FACIAL BONES: Spiral CT images were obtained through the  facial bones in the axial imaging plane and the images were reformatted  and submitted in 2 mm thick axial, sagittal and coronal CT sections with  soft tissue algorithm and 1 mm thick axial, sagittal and coronal CT  sections with high-resolution bone algorithm.      FINDINGS: There is a small scalp hematoma over the anterior inferior  right frontal bone extending into the right periorbital region. No  underlying skull fracture is seen. There is partial opacification of the  frontal recesses, ethmoid sinuses and posterior sphenoid sinuses with  fluid as well as posterior and inferior right maxillary sinus with fluid  and mild mucosal thickening in the inferior medial left maxillary sinus.  There is some fluid in the superior nasal cavities. The fluid in the  posterior right maxillary sinus is minimally hyperdense and may be  hemorrhagic. I see no convincing acute facial fracture.  Reidentified is  the subarachnoid hemorrhage in the sylvian fissures and suprasellar  cistern and perimesencephalic region.     IMPRESSION:  1. There is a small scalp hematoma over the anterior inferior right  frontal bone extending into the right periorbital region. No convincing  acute facial fracture is identified. There is partial opacification of  the frontal recesses, ethmoid sinuses, posterior sphenoid sinuses  bilaterally and posterior right maxillary sinus with fluid, and the  fluid in the  posterior right maxillary sinus is minimally hyperdense,  may be hemorrhagic.   2. Reidentified is acute subarachnoid hemorrhage in the sylvian  fissures, right greater than left, suprasellar cistern and right  perimesencephalic region and a small amount of intraventricular  hemorrhage in the posterior fourth ventricle as better described above.   The pattern of subarachnoid hemorrhage raises the possibility of a  ruptured aneurysm and neurosurgical consultation is warranted in further  evaluation with CTA of the head and ultimately a 6 vessel cerebral  arteriogram is recommended.        CERVICAL SPINE CT TECHNIQUE: Spiral CT images were obtained from the  skull base down to the mid body of the T3 thoracic level. The images  were reformatted and are submitted in 2 mm thick axial and sagittal CT  sections with soft tissue algorithm and 1 mm thick axial, sagittal and  coronal CT sections with high-resolution bone algorithm.      FINDINGS: The atlantooccipital articulation is within normal limits.      At C1-2 there are minimal arthritic changes at the atlantodental  interval. Otherwise, the C1-2 level is normal in appearance.     At C2-3, the disc space, facets and uncovertebral joints are normal in  appearance with no canal or foraminal narrowing.      At C3-4 there is mild disc space narrowing, degenerative endplate  changes and minimal posterior spurring, but no canal narrowing.  There  is minimal right and there is moderate left facet overgrowth and some  left-sided uncovertebral joint hypertrophy and there is moderate left  and no right foraminal narrowing.      At C4-5 there is disc space narrowing and degenerative endplate changes.   There is a mild diffuse posterior disc osteophyte complex and mild  canal narrowing.  There is some uncovertebral joint spurring into the  foramina and there is mild left and there is mild-to-moderate right bony  foraminal narrowing.      At C5-6 there is mild disc space narrowing,  degenerative endplate  changes, mild posterior spurring and mild canal narrowing. The facets  are normal.  There is some bilateral uncovertebral joint hypertrophy and  there is mild right and mild-to-moderate left bony foraminal narrowing.      At C6-7 there is mild disc space narrowing, degenerative endplate  changes, mild posterior spurring and only minimal if any canal  narrowing.  The facets are normal.  There is some bilateral  uncovertebral joint hypertrophy and there is mild bilateral bony  foraminal narrowing.      At C7-T1 the posterior endplates and facets are normal with no canal or  foraminal narrowing.      No acute fracture is seen in the cervical spine.     IMPRESSION:     1. No acute fracture is seen in the cervical spine.  There is cervical  spondylosis as described.      Radiation dose reduction techniques were utilized, including automated  exposure control and exposure modulation based on body size.        This report was finalized on 1/16/2024 2:00 PM by Dr. Franklyn Baltazar M.D  on Workstation: [id number]       CT Facial Bones Without Contrast [[id number]] Collected: 01/16/24 1315     Updated: 01/16/24 1403    Narrative:      EMERGENCY CT SCAN OF THE HEAD FACE AND CERVICAL SPINE ON 01/16/2024     CLINICAL HISTORY: Patient had a syncopal episode Friday night 01/12/2014  during which she fell into a sink, but before syncopal episodes patient  states she had sharp pain in back of head and shoulders, headache and  neck pain. Patient hit head and has ecchymosis in right periorbital  region.      HEAD CT TECHNIQUE: Spiral CT images were obtained from the base of the  skull to the vertex without intravenous contrast. The images were  reformatted and are submitted in 3 mm thick axial, sagittal and coronal  CT sections with brain algorithm and 2 mm thick axial CT sections with  high-resolution bone algorithm.      FINDINGS: The brain parenchyma is normal in attenuation. The lateral and  third ventricles are  very minimally prominent in size and the temporal  horns are slightly prominent, upper limits of normal to perhaps mildly  enlarged. I see no focal mass effect and no midline shift. There is  acute subarachnoid hemorrhage that fills the anterior right sylvian  fissure.  It tracks along the right M1 segment and also fills the  suprasellar cistern and extends along the margins of the [place] and also a small amount of subarachnoid hemorrhage tracks into  the left sylvian fissure. There is some subarachnoid hemorrhage tracking  into the right perimesencephalic region along the right anterior margin  of the maynor and midbrain. There is a tiny amount of intraventricular  hemorrhage in the fourth ventricle as well as within the posterior third  ventricle and a tiny amount in the posterior tip of the trigone of the  right lateral ventricle. There is a small scalp hematoma over the  anterior inferior right frontal bones extending into the right  periorbital region.  No underlying acute skull fracture is  identified.  There is partial opacification of the ethmoid sinuses and posterior  sphenoid sinuses with fluid in the posterior right maxillary sinus with  fluid that is minimally hyperdense.       Impression:      1. There is a small scalp hematoma over the anterior inferior right  frontal bone extending into the right periorbital region and no  underlying acute skull fracture is identified.   2. There is acute subarachnoid hemorrhage filling the anterior right  sylvian fissure tracking adjacent to the right middle cerebral artery  trifurcation and along the margins of the right M1 segment and also  extending into the suprasellar cistern along the margins of the I[place]  of García and tracking along the margin of the left M1 segment into the  anterior left sylvian fissure as well as tracking along the right  petroclinoid ligament into the right perimesencephalic region along the  anterolateral margin of the right  side of the maynor and midbrain and into  the prepontine cistern.  There are tiny foci of intraventricular  hemorrhage in the fourth ventricle and posterior third ventricle and  posterior tip of the trigone of the right lateral ventricle. The lateral  and third ventricles are very minimally prominent in size and the  temporal horns are slightly prominent in size, may be upper limits of  normal although very mild hydrocephalus is certainly not excluded. The  pattern of the subarachnoid hemorrhage raises the potential of a  ruptured aneurysm and I recommend neurosurgical consultation as well as  a CT angiogram of the head and ultimately a 6 vessel cerebral  arteriogram to further evaluate.   2. There is partial opacification of the ethmoid sinuses, posterior  sphenoid sinuses and posterior right maxillary sinus with fluid.  The  fluid in the right maxillary sinus is minimally hyperdense, could be  some hemorrhagic fluid. The remainder of the head CT is within normal  limits.        CT OF THE FACIAL BONES: Spiral CT images were obtained through the  facial bones in the axial imaging plane and the images were reformatted  and submitted in 2 mm thick axial, sagittal and coronal CT sections with  soft tissue algorithm and 1 mm thick axial, sagittal and coronal CT  sections with high-resolution bone algorithm.      FINDINGS: There is a small scalp hematoma over the anterior inferior  right frontal bone extending into the right periorbital region. No  underlying skull fracture is seen. There is partial opacification of the  frontal recesses, ethmoid sinuses and posterior sphenoid sinuses with  fluid as well as posterior and inferior right maxillary sinus with fluid  and mild mucosal thickening in the inferior medial left maxillary sinus.  There is some fluid in the superior nasal cavities. The fluid in the  posterior right maxillary sinus is minimally hyperdense and may be  hemorrhagic. I see no convincing acute facial  fracture.  Reidentified is  the subarachnoid hemorrhage in the sylvian fissures and suprasellar  cistern and perimesencephalic region.     IMPRESSION:  1. There is a small scalp hematoma over the anterior inferior right  frontal bone extending into the right periorbital region. No convincing  acute facial fracture is identified. There is partial opacification of  the frontal recesses, ethmoid sinuses, posterior sphenoid sinuses  bilaterally and posterior right maxillary sinus with fluid, and the  fluid in the posterior right maxillary sinus is minimally hyperdense,  may be hemorrhagic.   2. Reidentified is acute subarachnoid hemorrhage in the sylvian  fissures, right greater than left, suprasellar cistern and right  perimesencephalic region and a small amount of intraventricular  hemorrhage in the posterior fourth ventricle as better described above.   The pattern of subarachnoid hemorrhage raises the possibility of a  ruptured aneurysm and neurosurgical consultation is warranted in further  evaluation with CTA of the head and ultimately a 6 vessel cerebral  arteriogram is recommended.        CERVICAL SPINE CT TECHNIQUE: Spiral CT images were obtained from the  skull base down to the mid body of the T3 thoracic level. The images  were reformatted and are submitted in 2 mm thick axial and sagittal CT  sections with soft tissue algorithm and 1 mm thick axial, sagittal and  coronal CT sections with high-resolution bone algorithm.      FINDINGS: The atlantooccipital articulation is within normal limits.      At C1-2 there are minimal arthritic changes at the atlantodental  interval. Otherwise, the C1-2 level is normal in appearance.     At C2-3, the disc space, facets and uncovertebral joints are normal in  appearance with no canal or foraminal narrowing.      At C3-4 there is mild disc space narrowing, degenerative endplate  changes and minimal posterior spurring, but no canal narrowing.  There  is minimal right and  there is moderate left facet overgrowth and some  left-sided uncovertebral joint hypertrophy and there is moderate left  and no right foraminal narrowing.      At C4-5 there is disc space narrowing and degenerative endplate changes.   There is a mild diffuse posterior disc osteophyte complex and mild  canal narrowing.  There is some uncovertebral joint spurring into the  foramina and there is mild left and there is mild-to-moderate right bony  foraminal narrowing.      At C5-6 there is mild disc space narrowing, degenerative endplate  changes, mild posterior spurring and mild canal narrowing. The facets  are normal.  There is some bilateral uncovertebral joint hypertrophy and  there is mild right and mild-to-moderate left bony foraminal narrowing.      At C6-7 there is mild disc space narrowing, degenerative endplate  changes, mild posterior spurring and only minimal if any canal  narrowing.  The facets are normal.  There is some bilateral  uncovertebral joint hypertrophy and there is mild bilateral bony  foraminal narrowing.      At C7-T1 the posterior endplates and facets are normal with no canal or  foraminal narrowing.      No acute fracture is seen in the cervical spine.     IMPRESSION:     1. No acute fracture is seen in the cervical spine.  There is cervical  spondylosis as described.      Radiation dose reduction techniques were utilized, including automated  exposure control and exposure modulation based on body size.        This report was finalized on 1/16/2024 2:00 PM by Dr. Franklyn Baltazar M.D  on Workstation: [id number]               I personally viewed and interpreted the patient's imaging studies.    Assessment:     Subarachnoid hemorrhage secondary to #4  Headache secondary to above  History of hypertension  A-COM aneurysm rupture      Plan:     Patient had subarachnoid hemorrhage, likely traumatic  She is already on the Cardene drip for the hypertension which should also help for the vasospasm  Patient  went for the CT angiogram for evaluation decide on any surgical intervention, patient was supposed to come to the ICU however she was called to the operating room, will try to get some information from the neurosurgical team since I was not notified about the plan changes. Once back from the OR, we will take over and continue management accordingly.  Films were reviewed with the ER attending  Notes were reviewed  Discussed with the patient and discussed with the nursing staff  Patient will be coming to the operating room for the correcting the ruptured anterior communicating artery aneurysm and she will be back to the ICU afterwards for further critical monitoring    Time: Critical care 38 min    Chris Levine MD  [facility]   01/16/24  15:19 EST    Dictated utilizing Dragon dictation

## 2024-01-17 ENCOUNTER — APPOINTMENT (OUTPATIENT)
Dept: CT IMAGING | Facility: HOSPITAL | Age: 59
DRG: 020 | End: 2024-01-17
Payer: COMMERCIAL

## 2024-01-17 ENCOUNTER — APPOINTMENT (OUTPATIENT)
Dept: GENERAL RADIOLOGY | Facility: HOSPITAL | Age: 59
DRG: 020 | End: 2024-01-17
Payer: COMMERCIAL

## 2024-01-17 LAB
ANION GAP SERPL CALCULATED.3IONS-SCNC: 14 MMOL/L (ref 5–15)
BACTERIA UR QL AUTO: ABNORMAL /HPF
BILIRUB UR QL STRIP: NEGATIVE
BUN SERPL-MCNC: 13 MG/DL (ref 6–20)
BUN/CREAT SERPL: 20.6 (ref 7–25)
CALCIUM SPEC-SCNC: 8.8 MG/DL (ref 8.6–10.5)
CHLORIDE SERPL-SCNC: 102 MMOL/L (ref 98–107)
CLARITY UR: ABNORMAL
CO2 SERPL-SCNC: 20 MMOL/L (ref 22–29)
COLOR UR: YELLOW
CREAT SERPL-MCNC: 0.63 MG/DL (ref 0.57–1)
D-LACTATE SERPL-SCNC: 1 MMOL/L (ref 0.5–2)
DEPRECATED RDW RBC AUTO: 39.3 FL (ref 37–54)
EGFRCR SERPLBLD CKD-EPI 2021: 102.3 ML/MIN/1.73
ERYTHROCYTE [DISTWIDTH] IN BLOOD BY AUTOMATED COUNT: 12.3 % (ref 12.3–15.4)
GLUCOSE BLDC GLUCOMTR-MCNC: 134 MG/DL (ref 70–130)
GLUCOSE SERPL-MCNC: 122 MG/DL (ref 65–99)
GLUCOSE UR STRIP-MCNC: NEGATIVE MG/DL
HCT VFR BLD AUTO: 38 % (ref 34–46.6)
HGB BLD-MCNC: 12.9 G/DL (ref 12–15.9)
HGB UR QL STRIP.AUTO: ABNORMAL
HYALINE CASTS UR QL AUTO: ABNORMAL /LPF
KETONES UR QL STRIP: ABNORMAL
LEUKOCYTE ESTERASE UR QL STRIP.AUTO: ABNORMAL
MAGNESIUM SERPL-MCNC: 2.4 MG/DL (ref 1.6–2.6)
MCH RBC QN AUTO: 29.7 PG (ref 26.6–33)
MCHC RBC AUTO-ENTMCNC: 33.9 G/DL (ref 31.5–35.7)
MCV RBC AUTO: 87.4 FL (ref 79–97)
NITRITE UR QL STRIP: NEGATIVE
PH UR STRIP.AUTO: 5.5 [PH] (ref 5–8)
PLATELET # BLD AUTO: 164 10*3/MM3 (ref 140–450)
PMV BLD AUTO: 10.1 FL (ref 6–12)
POTASSIUM SERPL-SCNC: 2.7 MMOL/L (ref 3.5–5.2)
PROCALCITONIN SERPL-MCNC: 0.22 NG/ML (ref 0–0.25)
PROT UR QL STRIP: ABNORMAL
QT INTERVAL: 347 MS
QTC INTERVAL: 448 MS
RBC # BLD AUTO: 4.35 10*6/MM3 (ref 3.77–5.28)
RBC # UR STRIP: ABNORMAL /HPF
REF LAB TEST METHOD: ABNORMAL
SODIUM SERPL-SCNC: 136 MMOL/L (ref 136–145)
SP GR UR STRIP: 1.02 (ref 1–1.03)
SQUAMOUS #/AREA URNS HPF: ABNORMAL /HPF
UROBILINOGEN UR QL STRIP: ABNORMAL
WBC # UR STRIP: ABNORMAL /HPF
WBC NRBC COR # BLD AUTO: 11.33 10*3/MM3 (ref 3.4–10.8)

## 2024-01-17 PROCEDURE — 84145 PROCALCITONIN (PCT): CPT

## 2024-01-17 PROCEDURE — 87040 BLOOD CULTURE FOR BACTERIA: CPT

## 2024-01-17 PROCEDURE — 81001 URINALYSIS AUTO W/SCOPE: CPT

## 2024-01-17 PROCEDURE — 25810000003 SODIUM CHLORIDE 0.9 % SOLUTION: Performed by: INTERNAL MEDICINE

## 2024-01-17 PROCEDURE — 83735 ASSAY OF MAGNESIUM: CPT | Performed by: INTERNAL MEDICINE

## 2024-01-17 PROCEDURE — 83605 ASSAY OF LACTIC ACID: CPT

## 2024-01-17 PROCEDURE — 71045 X-RAY EXAM CHEST 1 VIEW: CPT

## 2024-01-17 PROCEDURE — 25010000002 HYDRALAZINE PER 20 MG

## 2024-01-17 PROCEDURE — 87077 CULTURE AEROBIC IDENTIFY: CPT

## 2024-01-17 PROCEDURE — 80048 BASIC METABOLIC PNL TOTAL CA: CPT | Performed by: INTERNAL MEDICINE

## 2024-01-17 PROCEDURE — 93886 INTRACRANIAL COMPLETE STUDY: CPT

## 2024-01-17 PROCEDURE — 70450 CT HEAD/BRAIN W/O DYE: CPT

## 2024-01-17 PROCEDURE — 85027 COMPLETE CBC AUTOMATED: CPT | Performed by: INTERNAL MEDICINE

## 2024-01-17 PROCEDURE — 87086 URINE CULTURE/COLONY COUNT: CPT

## 2024-01-17 PROCEDURE — 82948 REAGENT STRIP/BLOOD GLUCOSE: CPT

## 2024-01-17 PROCEDURE — 25010000002 LEVETRIRACETAM PER 10 MG: Performed by: INTERNAL MEDICINE

## 2024-01-17 PROCEDURE — 25010000002 NICARDIPINE 2.5 MG/ML SOLUTION: Performed by: INTERNAL MEDICINE

## 2024-01-17 PROCEDURE — 92523 SPEECH SOUND LANG COMPREHEN: CPT

## 2024-01-17 PROCEDURE — 87186 SC STD MICRODIL/AGAR DIL: CPT

## 2024-01-17 RX ORDER — HYDRALAZINE HYDROCHLORIDE 20 MG/ML
10 INJECTION INTRAMUSCULAR; INTRAVENOUS ONCE
Status: COMPLETED | OUTPATIENT
Start: 2024-01-17 | End: 2024-01-17

## 2024-01-17 RX ORDER — POTASSIUM CHLORIDE 750 MG/1
40 TABLET, FILM COATED, EXTENDED RELEASE ORAL EVERY 4 HOURS
Status: COMPLETED | OUTPATIENT
Start: 2024-01-17 | End: 2024-01-17

## 2024-01-17 RX ADMIN — ATORVASTATIN CALCIUM 20 MG: 20 TABLET, FILM COATED ORAL at 21:00

## 2024-01-17 RX ADMIN — NIMODIPINE 60 MG: 30 CAPSULE, LIQUID FILLED ORAL at 04:52

## 2024-01-17 RX ADMIN — NIMODIPINE 60 MG: 30 CAPSULE, LIQUID FILLED ORAL at 00:03

## 2024-01-17 RX ADMIN — LEVETIRACETAM 500 MG: 500 INJECTION, SOLUTION INTRAVENOUS at 09:01

## 2024-01-17 RX ADMIN — NICARDIPINE HYDROCHLORIDE 5 MG/HR: 25 INJECTION, SOLUTION INTRAVENOUS at 09:56

## 2024-01-17 RX ADMIN — POTASSIUM CHLORIDE 40 MEQ: 750 TABLET, EXTENDED RELEASE ORAL at 16:55

## 2024-01-17 RX ADMIN — ACETAMINOPHEN 650 MG: 325 TABLET, FILM COATED ORAL at 09:56

## 2024-01-17 RX ADMIN — NICARDIPINE HYDROCHLORIDE 7.5 MG/HR: 25 INJECTION, SOLUTION INTRAVENOUS at 07:30

## 2024-01-17 RX ADMIN — ACETAMINOPHEN 650 MG: 325 TABLET, FILM COATED ORAL at 20:53

## 2024-01-17 RX ADMIN — NICARDIPINE HYDROCHLORIDE 15 MG/HR: 25 INJECTION, SOLUTION INTRAVENOUS at 01:27

## 2024-01-17 RX ADMIN — NIMODIPINE 60 MG: 30 CAPSULE, LIQUID FILLED ORAL at 09:00

## 2024-01-17 RX ADMIN — Medication 10 ML: at 09:51

## 2024-01-17 RX ADMIN — Medication 10 ML: at 21:00

## 2024-01-17 RX ADMIN — NIMODIPINE 60 MG: 30 CAPSULE, LIQUID FILLED ORAL at 12:41

## 2024-01-17 RX ADMIN — HYDRALAZINE HYDROCHLORIDE 10 MG: 20 INJECTION INTRAMUSCULAR; INTRAVENOUS at 00:39

## 2024-01-17 RX ADMIN — NIMODIPINE 60 MG: 30 CAPSULE, LIQUID FILLED ORAL at 16:54

## 2024-01-17 RX ADMIN — POTASSIUM CHLORIDE 40 MEQ: 750 TABLET, EXTENDED RELEASE ORAL at 12:42

## 2024-01-17 RX ADMIN — ASPIRIN 81 MG: 81 TABLET, COATED ORAL at 09:01

## 2024-01-17 RX ADMIN — POTASSIUM CHLORIDE 40 MEQ: 750 TABLET, EXTENDED RELEASE ORAL at 20:52

## 2024-01-17 RX ADMIN — LEVETIRACETAM 500 MG: 500 INJECTION, SOLUTION INTRAVENOUS at 20:53

## 2024-01-17 RX ADMIN — NIMODIPINE 60 MG: 30 CAPSULE, LIQUID FILLED ORAL at 20:53

## 2024-01-17 RX ADMIN — NICARDIPINE HYDROCHLORIDE 12.5 MG/HR: 25 INJECTION, SOLUTION INTRAVENOUS at 03:40

## 2024-01-17 NOTE — PROGRESS NOTES
PROGRESS NOTE  Patient Name: Binta Morales  Age/Sex: 58 y.o. female  : 1965  MRN: [id number]    Date of Admission: 2024  Date of Encounter Visit: 24   LOS: 1 day   Patient Care Team:  Alma Delia Rudolph PA-C as PCP - General (Physician Assistant)    Chief Complaint: Subarachnoid hemorrhage due to ACOM aneurysm    Hospital course: Patient had her procedure yesterday with good outcome, no bleeding.  Her headache is her main complaint, that she was noted to be desaturating in the high 80s percentile while asleep on room air and the  was concerned about some congestion and concerned about the possibly of aspiration during her previous vomiting episodes she did not vomit in the hospital.  She is afebrile but Tmax was borderline at 99.8  White count is slightly abnormal at 12 and it is coming down with no antibiotics  Patient was seen by neurosurgery, she will be monitored for any cerebral vasospasm, she will be getting the Doppler studies today and daily for the next 3 days while she is on the calcium channel blockers  She is on the Cardene drip which at this point can be transition to an oral regimen        REVIEW OF SYSTEMS:   CONSTITUTIONAL: no fever or chills  CARDIOVASCULAR: No chest pain, chest pressure or chest discomfort. No palpitations or edema.   RESPIRATORY: No shortness of breath, cough or sputum.   GASTROINTESTINAL: No anorexia, nausea, vomiting or diarrhea. No abdominal pain or blood.   HEMATOLOGIC: No bleeding or bruising.  Except for the right periorbital ecchymosis    Ventilator/Non-Invasive Ventilation Settings (From admission, onward)      None              Vital Signs  Temp:  [97.5 °F (36.4 °C)-99.8 °F (37.7 °C)] 97.5 °F (36.4 °C)  Heart Rate:  [] 82  Resp:  [14-20] 15  BP: (107-160)/(57-98) 115/67  SpO2:  [87 %-98 %] 91 %  on  Flow (L/min):  [2-4] 4 Device (Oxygen Therapy): nasal cannula    Intake/Output Summary (Last 24 hours) at 2024 1257  Last data filed at  "1/17/2024 1100  Gross per 24 hour   Intake 1262.8 ml   Output 2380 ml   Net -1117.2 ml     Flowsheet Rows      Flowsheet Row First Filed Value   Admission Height 165.1 cm (65\") Documented at 01/17/2024 1009   Admission Weight 68.9 kg (151 lb 14.4 oz) Documented at 01/17/2024 0545          Body mass index is 25.28 kg/m².      01/17/24  0545   Weight: 68.9 kg (151 lb 14.4 oz)       Physical Exam:  GEN:  No acute distress, alert, cooperative, well developed   EYES:   Sclerae clear. No icterus. PERRL. Normal EOM.  She has raccoon eye on the right side from her previous trauma  ENT:   External ears/nose normal, no oral lesions, no thrush, mucous membranes moist  NECK:  Supple, midline trachea, no JVD  LUNGS: Normal chest on inspection, minimal rhonchi otherwise clear to auscultation bilaterally with no wheezes. Respirations regular, even and unlabored.   CV:  Regular rhythm and rate. Normal S1/S2. No murmurs, gallops, or rubs noted.  ABD:  Soft, nontender and nondistended. Normal bowel sounds. No guarding  EXT:  Moves all extremities well. No cyanosis. No redness. No edema.   Skin: Dry, intact, no bleeding    Results Review:        Results from last 7 days   Lab Units 01/17/24  0611 01/16/24  1138   SODIUM mmol/L 136 138   POTASSIUM mmol/L 2.7* 3.4*   CHLORIDE mmol/L 102 98   CO2 mmol/L 20.0* 22.0   BUN mg/dL 13 20   CREATININE mg/dL 0.63 0.87   CALCIUM mg/dL 8.8 10.3   AST (SGOT) U/L  --  17   ALT (SGPT) U/L  --  39*   ANION GAP mmol/L 14.0 18.0*   ALBUMIN g/dL  --  4.3     Results from last 7 days   Lab Units 01/16/24  1138   HSTROP T ng/L 34*             Results from last 7 days   Lab Units 01/17/24  0611 01/16/24  1138   WBC 10*3/mm3 11.33* 12.30*   HEMOGLOBIN g/dL 12.9 15.7   HEMATOCRIT % 38.0 47.2*   PLATELETS 10*3/mm3 164 373   MCV fL 87.4 83.8   NEUTROPHIL % %  --  84.0*   LYMPHOCYTE % %  --  10.6*   MONOCYTES % %  --  4.8*   EOSINOPHIL % %  --  0.0*   BASOPHIL % %  --  0.2   IMM GRAN % %  --  0.4     Results " "from last 7 days   Lab Units 01/16/24  1253   INR  1.09   APTT seconds 29.8     Results from last 7 days   Lab Units 01/17/24  0611   MAGNESIUM mg/dL 2.4           Invalid input(s): \"LDLCALC\"          Glucose   Date/Time Value Ref Range Status   01/17/2024 1200 134 (H) 70 - 130 mg/dL Final   01/16/2024 2019 115 70 - 130 mg/dL Final                               Imaging:   Imaging Results (All)        I reviewed the patient's new clinical results.  I personally viewed and interpreted the patient's imaging results:        Medication Review:   aspirin, 81 mg, Oral, Daily  atorvastatin, 20 mg, Oral, Nightly  levETIRAcetam, 500 mg, Intravenous, Q12H  niMODipine, 60 mg, Oral, Q4H  potassium chloride, 40 mEq, Oral, Q4H  senna-docusate sodium, 2 tablet, Oral, BID  sodium chloride, 10 mL, Intravenous, Q12H        niCARdipine, 5-15 mg/hr, Last Rate: 5 mg/hr (01/17/24 0956)        ASSESSMENT:   ACOM aneurysm status post rupture and repair  Subarachnoid hemorrhage secondary to above  Hypoxemia mainly while asleep with sats of 88% on room air while asleep  Hypertension by history    PLAN:  Case was discussed with the neurosurgical team, patient is doing better, and now she will be monitored for any rebleed or vasospasm.  Blood pressure range can be more liberal at this point and we will aim for systolic between 100 and 180  Will try to get her off the Cardene drip given the above  Continue with the Keppra  Continue with the nimodipine  She is complaining of congestion, her oxygen dropped to 89% when asleep on room air and we will get a chest x-ray to rule out any aspiration pneumonia that the  was suspecting when she vomited before.  Her white count was only mildly abnormal and she is afebrile and will initiate antibiotic only if deemed necessary  Discontinue Owens catheter  Discussed with neurosurgical team, discussed with the patient and with the spouse  Discussed with the ICU NP  Labs/Notes/films were independently " reviewed and pertinent results are summarized above  The copied texts in this note were reviewed and they are accurate as of 01/17/24    Disposition: Continue to monitor in the ICU    Chris Levine MD  01/17/24  12:57 EST          Dictated utilizing Dragon dictation

## 2024-01-17 NOTE — THERAPY EVALUATION
"Acute Care - Speech Language Pathology Initial Evaluation  [facility]     Patient Name: Binta Morales  : 1965  MRN: [id number]  Today's Date: 2024               Admit Date: 2024     Visit Dx:    ICD-10-CM ICD-9-CM   1. SAH (subarachnoid hemorrhage)  I60.9 430     Patient Active Problem List   Diagnosis    SAH (subarachnoid hemorrhage)    Ruptured aneurysm of anterior communicating artery    Communicating hydrocephalus     Past Medical History:   Diagnosis Date    Hypertension      History reviewed. No pertinent surgical history.    SLP Recommendation and Plan     SLP Diagnosis Comments: Patient seen for speech/language assessment. Pt passed RN swallow screen and denies dysphagia. No overt s/s of aspiration appreciated with thins via straw. Oral mech exam remarkable for slight hoarse voice. Mild deficits noted with three step commands and complex y/n questions. Expressive and motor speech WFLs. Will continue to follow for receptive language. (24 1000)                                                     Plan of Care Reviewed With: patient (24 09)  Outcome Evaluation: Patient seen for speech/language assessment. Pt passed RN swallow screen and denies dysphagia. No overt s/s of aspiration appreciated with thins via straw. Oral mech exam remarkable for slight hoarse voice. Mild deficits noted with three step commands and complex y/n questions. Expressive and motor speech WFLs. Will continue to follow for receptive language. (24 0912)      SLP EVALUATION (last 72 hours)       SLP SLC Evaluation       Row Name 24 1000                   Communication Assessment/Intervention    Document Type evaluation  -SH        Patient Effort excellent  -SH           General Information    Patient Profile Reviewed yes  -SH        Pertinent History Of Current Problem \"58-year-old female with SAH secondary to ruptured ACOM aneurysm postprocedure day 1 for embolization and successful web " "placement\".  -        Precautions/Limitations, Vision corrective lenses needed for reading  -        Precautions/Limitations, Hearing for purposes of eval  -        Prior Level of Function-Communication [facility]        Plans/Goals Discussed with patient;family;agreed upon  [facility]        Barriers to Rehab medically complex  -        Patient's Goals for Discharge patient did not state  -        Family Goals for Discharge patient able to return to previous activities/roles  -           Pain    Additional Documentation Pain Scale: FACES Pre/Post-Treatment (Group)  -           Pain Scale: FACES Pre/Post-Treatment    Pain: FACES Scale, Pretreatment 0-->no hurt  -           Comprehension Assessment/Intervention    Comprehension Assessment/Intervention Auditory Comprehension  -           Auditory Comprehension Assessment/Intervention    Auditory Comprehension (Communication) mild impairment  -        Able to Identify Objects/Pictures (Communication) body part;familiar objects;pictures of common objects;L  -        Answers Questions (Communication) yes/no;simple;concrete;WFL;complex;abstract;mild impairment  -        Able to Follow Commands (Communication) 1-step;2-step;WFL;3-step;mild impairment  -        Narrative Discourse simple paragraph level;L  -           Expression Assessment/Intervention    Expression Assessment/Intervention verbal expression  -           Verbal Expression Assessment/Intervention    Verbal Expression [facility]  -        Automatic Speech (Communication) response to greeting;counting 1-20;[facility]  -        Repetition sounds;words;phrases;sentences;[facility]  -        Phrase Completion automatic/predictable;unpredictable;L  -        Responsive Naming simple;complex;L  -        Confrontational Naming high frequency;low frequency;[facility]  -        Spontaneous/Functional Words simple;complex;[facility]  -        Sentence Formulation simple;complex;L  -        Conversational " Discourse/Fluency [facility]           Oral Motor Structure and Function    Oral Motor Structure and Function [facility]  -           Oral Musculature and Cranial Nerve Assessment    Oral Motor General Assessment [facility]  -           Motor Speech Assessment/Intervention    Motor Speech Function [facility]  -           SLP Evaluation Clinical Impressions    SLP Diagnosis Comments Patient seen for speech/language assessment. Pt passed RN swallow screen and denies dysphagia. No overt s/s of aspiration appreciated with thins via straw. Oral mech exam remarkable for slight hoarse voice. Mild deficits noted with three step commands and complex y/n questions. Expressive and motor speech WFLs. Will continue to follow for receptive language.  -        Rehab Potential/Prognosis excellent  -           Communication Treatment Objective and Progress Goals (SLP)    Auditory Comprehension Treatment Objectives Auditory Comprehension Treatment Objectives (Group)  -           Auditory Comprehension Treatment Objectives    Comprehend Questions Selection comprehend questions, SLP goal 1  -        Follow Directions Selection follow directions, SLP goal 1  -           Comprehend Questions Goal 1 (SLP)    Improve Ability to Comprehend Questions Goal 1 (SLP) complex yes/no questions;100%;independently (over 90% accuracy)  -        Time Frame (Comprehend Questions Goal 1, SLP) by discharge  -           Follow Directions Goal 2 (SLP)    Improve Ability to Follow Directions Goal 1 (SLP) 3 step commands;100%;independently (over 90% accuracy)  -        Time Frame (Follow Directions Goal 1, [facility]) by discharge  -                  User Key  (r) = Recorded By, (t) = Taken By, (c) = Cosigned By      Initials Name Effective Dates     Iraida Riacrdo, SLP 01/05/24 -                        EDUCATION  The patient has been educated in the following areas:     Communication Impairment.           [facility] GOALS       Row Name 01/17/24 1000              Comprehend Questions Goal 1 (SLP)    Improve Ability to Comprehend Questions Goal 1 (SLP) complex yes/no questions;100%;independently (over 90% accuracy)  -      Time Frame (Comprehend Questions Goal 1, SLP) by discharge  -         Follow Directions Goal 2 (SLP)    Improve Ability to Follow Directions Goal 1 (SLP) 3 step commands;100%;independently (over 90% accuracy)  -      Time Frame (Follow Directions Goal 1, SLP) by discharge  -                User Key  (r) = Recorded By, (t) = Taken By, (c) = Cosigned By      Initials Name Provider Type    Iraida Alberts SLP Speech and Language Pathologist                            Time Calculation:      Time Calculation- SLP       Row Name 01/17/24 1101             Time Calculation- [facility]    SLP Start Time 0830  -      SLP Received On 01/17/24  -                User Key  (r) = Recorded By, (t) = Taken By, (c) = Cosigned By      Initials Name Provider Type    Iraida Alberts SLP Speech and Language Pathologist                    Therapy Charges for Today       Code Description Service Date Service Provider Modifiers Qty    [id number] [facility] EVAL SPEECH AND PROD W LANG  4 1/17/2024 Iraida Ricardo SLP GN 1                       [id number] Bush  1/17/2024

## 2024-01-17 NOTE — PLAN OF CARE
Goal Outcome Evaluation:  Plan of Care Reviewed With: patient           Outcome Evaluation: Patient seen for speech/language assessment. Pt passed RN swallow screen and denies dysphagia. No overt s/s of aspiration appreciated with thins via straw. Oral mech exam remarkable for slight hoarse voice. Mild deficits noted with three step commands and complex y/n questions. Expressive and motor speech WFLs. Will continue to follow for receptive language.

## 2024-01-17 NOTE — PROGRESS NOTES
[facility] NEUROSURGERY INTRACRANIAL PROGRESS NOTE    PATIENT IDENTIFICATION:   Name:  Binta Morales      MRN:  [id number]     58 y.o.  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 denies headache.     ROS:  Constitutional: No fever, chills  HEENT: No headache, no vision changes, no tinnitus, no hearing difficulties  Neck: no stiffness  GI: No nausea, vomiting, no swallow difficulties  Neuro: No numbness, tingling, or weakness, no speech difficulties, no balance difficulties    Objective     Vital signs in last 24 hours:  Temp:  [98.8 °F (37.1 °C)-99.9 °F (37.7 °C)] 99.8 °F (37.7 °C)  Heart Rate:  [] 97  Resp:  [14-20] 15  BP: (108-160)/(58-98) 122/64    Intake/Output this shift:  No intake/output data recorded.    Intake/Output last 3 shifts:  I/O last 3 completed shifts:  In: 1262.8 [I.V.:1262.8]  Out: 1980 [Urine:1980]    LABS:  Results from last 7 days   Lab Units 01/17/24  0611 01/16/24  1138   WBC 10*3/mm3 11.33* 12.30*   HEMOGLOBIN g/dL 12.9 15.7   HEMATOCRIT % 38.0 47.2*   PLATELETS 10*3/mm3 164 373       Results from last 7 days   Lab Units 01/17/24  0611 01/16/24  1138   SODIUM mmol/L 136 138   POTASSIUM mmol/L 2.7* 3.4*   CHLORIDE mmol/L 102 98   CO2 mmol/L 20.0* 22.0   BUN mg/dL 13 20   CREATININE mg/dL 0.63 0.87   GLUCOSE mg/dL 122* 112*   CALCIUM mg/dL 8.8 10.3          IMAGING STUDIES:  CT Head Without Contrast    Result Date: 1/17/2024  Electronically signed by Rhianna Chi MD on 01-17-24 at 0524    CT Head Without Contrast    Result Date: 1/16/2024  The patient is status post endovascular embolization of the left A1/A2 aneurysm. Subarachnoid blood appears similar in volume as compared to the study from earlier same day with an element of redistribution. There is no evidence of new hemorrhage or of acute infarction. The temporal  horns are mildly prominent, unchanged.  Radiation dose reduction techniques were utilized, including automated exposure control and exposure modulation based on body size.   This report was finalized on 1/16/2024 8:58 PM by Dr. Faizan Nicole M.D on Workstation: [id number]      CT Angiogram Head    Result Date: 1/16/2024  1.  There is an ovoid 5 mm aneurysm arising from the left A1-A2 junction. It measures approximately 5 mm in size. The neck is not optimally visualized but likely measures 3 mm. The aneurysm is irregular anteriorly and projects anteriorly, medially and inferiorly. Note is made that the right A1 segment is hypoplastic. 2.  The volume of subarachnoid blood as well as mild prominence of the temporal horns is unchanged as compared to the earlier study.  The above information was called to and discussed with RENNY Jean.   Radiation dose reduction techniques were utilized, including automated exposure control and exposure modulation based on body size.   This report was finalized on 1/16/2024 7:28 PM by Dr. Faizan Nicole M.D on Workstation: [id number]      CT Angiogram Neck    Result Date: 1/16/2024  1.  There is an ovoid 5 mm aneurysm arising from the left A1-A2 junction. It measures approximately 5 mm in size. The neck is not optimally visualized but likely measures 3 mm. The aneurysm is irregular anteriorly and projects anteriorly, medially and inferiorly. Note is made that the right A1 segment is hypoplastic. 2.  The volume of subarachnoid blood as well as mild prominence of the temporal horns is unchanged as compared to the earlier study.  The above information was called to and discussed with RENNY Jean.   Radiation dose reduction techniques were utilized, including automated exposure control and exposure modulation based on body size.   This report was finalized on 1/16/2024 7:28 PM by Dr. Faizan Nicole M.D on Workstation: [id number]      CT Head Without Contrast    Result Date:  1/16/2024  1. There is a small scalp hematoma over the anterior inferior right frontal bone extending into the right periorbital region and no underlying acute skull fracture is identified. 2. There is acute subarachnoid hemorrhage filling the anterior right sylvian fissure tracking adjacent to the right middle cerebral artery trifurcation and along the margins of the right M1 segment and also extending into the suprasellar cistern along the margins of the [place] and tracking along the margin of the left M1 segment into the anterior left sylvian fissure as well as tracking along the right petroclinoid ligament into the right perimesencephalic region along the anterolateral margin of the right side of the maynor and midbrain and into the prepontine cistern.  There are tiny foci of intraventricular hemorrhage in the fourth ventricle and posterior third ventricle and posterior tip of the trigone of the right lateral ventricle. The lateral and third ventricles are very minimally prominent in size and the temporal horns are slightly prominent in size, may be upper limits of normal although very mild hydrocephalus is certainly not excluded. The pattern of the subarachnoid hemorrhage raises the potential of a ruptured aneurysm and I recommend neurosurgical consultation as well as a CT angiogram of the head and ultimately a 6 vessel cerebral arteriogram to further evaluate. 2. There is partial opacification of the ethmoid sinuses, posterior sphenoid sinuses and posterior right maxillary sinus with fluid.  The fluid in the right maxillary sinus is minimally hyperdense, could be some hemorrhagic fluid. The remainder of the head CT is within normal limits.   CT OF THE FACIAL BONES: Spiral CT images were obtained through the facial bones in the axial imaging plane and the images were reformatted and submitted in 2 mm thick axial, sagittal and coronal CT sections with soft tissue algorithm and 1 mm thick axial, sagittal  and coronal CT sections with high-resolution bone algorithm.  FINDINGS: There is a small scalp hematoma over the anterior inferior right frontal bone extending into the right periorbital region. No underlying skull fracture is seen. There is partial opacification of the frontal recesses, ethmoid sinuses and posterior sphenoid sinuses with fluid as well as posterior and inferior right maxillary sinus with fluid and mild mucosal thickening in the inferior medial left maxillary sinus. There is some fluid in the superior nasal cavities. The fluid in the posterior right maxillary sinus is minimally hyperdense and may be hemorrhagic. I see no convincing acute facial fracture.  Reidentified is the subarachnoid hemorrhage in the sylvian fissures and suprasellar cistern and perimesencephalic region.  IMPRESSION: 1. There is a small scalp hematoma over the anterior inferior right frontal bone extending into the right periorbital region. No convincing acute facial fracture is identified. There is partial opacification of the frontal recesses, ethmoid sinuses, posterior sphenoid sinuses bilaterally and posterior right maxillary sinus with fluid, and the fluid in the posterior right maxillary sinus is minimally hyperdense, may be hemorrhagic. 2. Reidentified is acute subarachnoid hemorrhage in the sylvian fissures, right greater than left, suprasellar cistern and right perimesencephalic region and a small amount of intraventricular hemorrhage in the posterior fourth ventricle as better described above. The pattern of subarachnoid hemorrhage raises the possibility of a ruptured aneurysm and neurosurgical consultation is warranted in further evaluation with CTA of the head and ultimately a 6 vessel cerebral arteriogram is recommended.   CERVICAL SPINE CT TECHNIQUE: Spiral CT images were obtained from the skull base down to the mid body of the T3 thoracic level. The images were reformatted and are submitted in 2 mm thick axial and  sagittal CT sections with soft tissue algorithm and 1 mm thick axial, sagittal and coronal CT sections with high-resolution bone algorithm.  FINDINGS: The atlantooccipital articulation is within normal limits.  At C1-2 there are minimal arthritic changes at the atlantodental interval. Otherwise, the C1-2 level is normal in appearance.  At C2-3, the disc space, facets and uncovertebral joints are normal in appearance with no canal or foraminal narrowing.  At C3-4 there is mild disc space narrowing, degenerative endplate changes and minimal posterior spurring, but no canal narrowing.  There is minimal right and there is moderate left facet overgrowth and some left-sided uncovertebral joint hypertrophy and there is moderate left and no right foraminal narrowing.  At C4-5 there is disc space narrowing and degenerative endplate changes.  There is a mild diffuse posterior disc osteophyte complex and mild canal narrowing.  There is some uncovertebral joint spurring into the foramina and there is mild left and there is mild-to-moderate right bony foraminal narrowing.  At C5-6 there is mild disc space narrowing, degenerative endplate changes, mild posterior spurring and mild canal narrowing. The facets are normal.  There is some bilateral uncovertebral joint hypertrophy and there is mild right and mild-to-moderate left bony foraminal narrowing.  At C6-7 there is mild disc space narrowing, degenerative endplate changes, mild posterior spurring and only minimal if any canal narrowing.  The facets are normal.  There is some bilateral uncovertebral joint hypertrophy and there is mild bilateral bony foraminal narrowing.  At C7-T1 the posterior endplates and facets are normal with no canal or foraminal narrowing.  No acute fracture is seen in the cervical spine.  IMPRESSION:  1. No acute fracture is seen in the cervical spine.  There is cervical spondylosis as described.  Radiation dose reduction techniques were utilized,  including automated exposure control and exposure modulation based on body size.   This report was finalized on 1/16/2024 2:00 PM by Dr. Franklyn Baltazar M.D on Workstation: [id number]      CT Cervical Spine Without Contrast    Result Date: 1/16/2024  1. There is a small scalp hematoma over the anterior inferior right frontal bone extending into the right periorbital region and no underlying acute skull fracture is identified. 2. There is acute subarachnoid hemorrhage filling the anterior right sylvian fissure tracking adjacent to the right middle cerebral artery trifurcation and along the margins of the right M1 segment and also extending into the suprasellar cistern along the margins of the [place] of [place] and tracking along the margin of the left M1 segment into the anterior left sylvian fissure as well as tracking along the right petroclinoid ligament into the right perimesencephalic region along the anterolateral margin of the right side of the maynor and midbrain and into the prepontine cistern.  There are tiny foci of intraventricular hemorrhage in the fourth ventricle and posterior third ventricle and posterior tip of the trigone of the right lateral ventricle. The lateral and third ventricles are very minimally prominent in size and the temporal horns are slightly prominent in size, may be upper limits of normal although very mild hydrocephalus is certainly not excluded. The pattern of the subarachnoid hemorrhage raises the potential of a ruptured aneurysm and I recommend neurosurgical consultation as well as a CT angiogram of the head and ultimately a 6 vessel cerebral arteriogram to further evaluate. 2. There is partial opacification of the ethmoid sinuses, posterior sphenoid sinuses and posterior right maxillary sinus with fluid.  The fluid in the right maxillary sinus is minimally hyperdense, could be some hemorrhagic fluid. The remainder of the head CT is within normal limits.   CT OF THE FACIAL BONES: Spiral  CT images were obtained through the facial bones in the axial imaging plane and the images were reformatted and submitted in 2 mm thick axial, sagittal and coronal CT sections with soft tissue algorithm and 1 mm thick axial, sagittal and coronal CT sections with high-resolution bone algorithm.  FINDINGS: There is a small scalp hematoma over the anterior inferior right frontal bone extending into the right periorbital region. No underlying skull fracture is seen. There is partial opacification of the frontal recesses, ethmoid sinuses and posterior sphenoid sinuses with fluid as well as posterior and inferior right maxillary sinus with fluid and mild mucosal thickening in the inferior medial left maxillary sinus. There is some fluid in the superior nasal cavities. The fluid in the posterior right maxillary sinus is minimally hyperdense and may be hemorrhagic. I see no convincing acute facial fracture.  Reidentified is the subarachnoid hemorrhage in the sylvian fissures and suprasellar cistern and perimesencephalic region.  IMPRESSION: 1. There is a small scalp hematoma over the anterior inferior right frontal bone extending into the right periorbital region. No convincing acute facial fracture is identified. There is partial opacification of the frontal recesses, ethmoid sinuses, posterior sphenoid sinuses bilaterally and posterior right maxillary sinus with fluid, and the fluid in the posterior right maxillary sinus is minimally hyperdense, may be hemorrhagic. 2. Reidentified is acute subarachnoid hemorrhage in the sylvian fissures, right greater than left, suprasellar cistern and right perimesencephalic region and a small amount of intraventricular hemorrhage in the posterior fourth ventricle as better described above. The pattern of subarachnoid hemorrhage raises the possibility of a ruptured aneurysm and neurosurgical consultation is warranted in further evaluation with CTA of the head and ultimately a 6 vessel  cerebral arteriogram is recommended.   CERVICAL SPINE CT TECHNIQUE: Spiral CT images were obtained from the skull base down to the mid body of the T3 thoracic level. The images were reformatted and are submitted in 2 mm thick axial and sagittal CT sections with soft tissue algorithm and 1 mm thick axial, sagittal and coronal CT sections with high-resolution bone algorithm.  FINDINGS: The atlantooccipital articulation is within normal limits.  At C1-2 there are minimal arthritic changes at the atlantodental interval. Otherwise, the C1-2 level is normal in appearance.  At C2-3, the disc space, facets and uncovertebral joints are normal in appearance with no canal or foraminal narrowing.  At C3-4 there is mild disc space narrowing, degenerative endplate changes and minimal posterior spurring, but no canal narrowing.  There is minimal right and there is moderate left facet overgrowth and some left-sided uncovertebral joint hypertrophy and there is moderate left and no right foraminal narrowing.  At C4-5 there is disc space narrowing and degenerative endplate changes.  There is a mild diffuse posterior disc osteophyte complex and mild canal narrowing.  There is some uncovertebral joint spurring into the foramina and there is mild left and there is mild-to-moderate right bony foraminal narrowing.  At C5-6 there is mild disc space narrowing, degenerative endplate changes, mild posterior spurring and mild canal narrowing. The facets are normal.  There is some bilateral uncovertebral joint hypertrophy and there is mild right and mild-to-moderate left bony foraminal narrowing.  At C6-7 there is mild disc space narrowing, degenerative endplate changes, mild posterior spurring and only minimal if any canal narrowing.  The facets are normal.  There is some bilateral uncovertebral joint hypertrophy and there is mild bilateral bony foraminal narrowing.  At C7-T1 the posterior endplates and facets are normal with no canal or  foraminal narrowing.  No acute fracture is seen in the cervical spine.  IMPRESSION:  1. No acute fracture is seen in the cervical spine.  There is cervical spondylosis as described.  Radiation dose reduction techniques were utilized, including automated exposure control and exposure modulation based on body size.   This report was finalized on 1/16/2024 2:00 PM by Dr. Franklyn Baltazar M.D on Workstation: [id number]      CT Facial Bones Without Contrast    Result Date: 1/16/2024  1. There is a small scalp hematoma over the anterior inferior right frontal bone extending into the right periorbital region and no underlying acute skull fracture is identified. 2. There is acute subarachnoid hemorrhage filling the anterior right sylvian fissure tracking adjacent to the right middle cerebral artery trifurcation and along the margins of the right M1 segment and also extending into the suprasellar cistern along the margins of the [place] of [place] and tracking along the margin of the left M1 segment into the anterior left sylvian fissure as well as tracking along the right petroclinoid ligament into the right perimesencephalic region along the anterolateral margin of the right side of the maynor and midbrain and into the prepontine cistern.  There are tiny foci of intraventricular hemorrhage in the fourth ventricle and posterior third ventricle and posterior tip of the trigone of the right lateral ventricle. The lateral and third ventricles are very minimally prominent in size and the temporal horns are slightly prominent in size, may be upper limits of normal although very mild hydrocephalus is certainly not excluded. The pattern of the subarachnoid hemorrhage raises the potential of a ruptured aneurysm and I recommend neurosurgical consultation as well as a CT angiogram of the head and ultimately a 6 vessel cerebral arteriogram to further evaluate. 2. There is partial opacification of the ethmoid sinuses, posterior sphenoid sinuses  and posterior right maxillary sinus with fluid.  The fluid in the right maxillary sinus is minimally hyperdense, could be some hemorrhagic fluid. The remainder of the head CT is within normal limits.   CT OF THE FACIAL BONES: Spiral CT images were obtained through the facial bones in the axial imaging plane and the images were reformatted and submitted in 2 mm thick axial, sagittal and coronal CT sections with soft tissue algorithm and 1 mm thick axial, sagittal and coronal CT sections with high-resolution bone algorithm.  FINDINGS: There is a small scalp hematoma over the anterior inferior right frontal bone extending into the right periorbital region. No underlying skull fracture is seen. There is partial opacification of the frontal recesses, ethmoid sinuses and posterior sphenoid sinuses with fluid as well as posterior and inferior right maxillary sinus with fluid and mild mucosal thickening in the inferior medial left maxillary sinus. There is some fluid in the superior nasal cavities. The fluid in the posterior right maxillary sinus is minimally hyperdense and may be hemorrhagic. I see no convincing acute facial fracture.  Reidentified is the subarachnoid hemorrhage in the sylvian fissures and suprasellar cistern and perimesencephalic region.  IMPRESSION: 1. There is a small scalp hematoma over the anterior inferior right frontal bone extending into the right periorbital region. No convincing acute facial fracture is identified. There is partial opacification of the frontal recesses, ethmoid sinuses, posterior sphenoid sinuses bilaterally and posterior right maxillary sinus with fluid, and the fluid in the posterior right maxillary sinus is minimally hyperdense, may be hemorrhagic. 2. Reidentified is acute subarachnoid hemorrhage in the sylvian fissures, right greater than left, suprasellar cistern and right perimesencephalic region and a small amount of intraventricular hemorrhage in the posterior fourth  ventricle as better described above. The pattern of subarachnoid hemorrhage raises the possibility of a ruptured aneurysm and neurosurgical consultation is warranted in further evaluation with CTA of the head and ultimately a 6 vessel cerebral arteriogram is recommended.   CERVICAL SPINE CT TECHNIQUE: Spiral CT images were obtained from the skull base down to the mid body of the T3 thoracic level. The images were reformatted and are submitted in 2 mm thick axial and sagittal CT sections with soft tissue algorithm and 1 mm thick axial, sagittal and coronal CT sections with high-resolution bone algorithm.  FINDINGS: The atlantooccipital articulation is within normal limits.  At C1-2 there are minimal arthritic changes at the atlantodental interval. Otherwise, the C1-2 level is normal in appearance.  At C2-3, the disc space, facets and uncovertebral joints are normal in appearance with no canal or foraminal narrowing.  At C3-4 there is mild disc space narrowing, degenerative endplate changes and minimal posterior spurring, but no canal narrowing.  There is minimal right and there is moderate left facet overgrowth and some left-sided uncovertebral joint hypertrophy and there is moderate left and no right foraminal narrowing.  At C4-5 there is disc space narrowing and degenerative endplate changes.  There is a mild diffuse posterior disc osteophyte complex and mild canal narrowing.  There is some uncovertebral joint spurring into the foramina and there is mild left and there is mild-to-moderate right bony foraminal narrowing.  At C5-6 there is mild disc space narrowing, degenerative endplate changes, mild posterior spurring and mild canal narrowing. The facets are normal.  There is some bilateral uncovertebral joint hypertrophy and there is mild right and mild-to-moderate left bony foraminal narrowing.  At C6-7 there is mild disc space narrowing, degenerative endplate changes, mild posterior spurring and only minimal if  any canal narrowing.  The facets are normal.  There is some bilateral uncovertebral joint hypertrophy and there is mild bilateral bony foraminal narrowing.  At C7-T1 the posterior endplates and facets are normal with no canal or foraminal narrowing.  No acute fracture is seen in the cervical spine.  IMPRESSION:  1. No acute fracture is seen in the cervical spine.  There is cervical spondylosis as described.  Radiation dose reduction techniques were utilized, including automated exposure control and exposure modulation based on body size.   This report was finalized on 1/16/2024 2:00 PM by Dr. Franklyn Baltazar M.D on Workstation: [id number]         I personally viewed and interpreted the patient's chart.    Meds reviewed    Physical exam  Drowsy, will arise, oriented x3  Pupils equal round reactive to light  Extraocular muscles intact  Face symmetric  Speech is fluent and clear  No pronator drift  Motor exam  Bilateral deltoids 5/5, bilateral biceps 5/5, bilateral triceps 5/5, bilateral wrist extension 5/5 bilateral hand  5/5  Bilateral hip flexion 5/5, bilateral knee extension 5/5, bilateral DF/PF 5/5  gait deferred  Able to detect light touch in all 4 extremities  right orbital ecchymosis     Assessment & Plan     ASSESSMENT:      SAH (subarachnoid hemorrhage)    Ruptured aneurysm of anterior communicating artery    Communicating hydrocephalus    58-year-old female with SAH secondary to ruptured ACOM aneurysm postprocedure day 1 for embolization and successful web placement    PLAN:     Continue ICU  Neurochecks q2h  Nimodipine x 21 days  Keppra  SBP goal 100-180 mmHg  Daily TCD's  CT in a.m.  DC FC    I discussed the patient's findings and my recommendations with Dr. Duncan, patient, family, nursing staff, and primary care team    I spent 35 minutes caring for Binta Morales on this date of service. This time includes time spent by me in the following activities: preparing for the visit, reviewing tests, obtaining  "and/or reviewing a separately obtained history, performing a medically appropriate examination and/or evaluation, counseling and educating the patient/family/caregiver, ordering medications, tests, or procedures, referring and communicating with other health care professionals, documenting information in the medical record, independently interpreting results and communicating that information with the patient/family/caregiver, and care coordination          LOS: 1 day       Alma Delia Gutierrez, APRN  1/17/2024  10:26 EST    \"Dictated utilizing Dragon dictation\".      "

## 2024-01-17 NOTE — ANESTHESIA POSTPROCEDURE EVALUATION
Patient: Binta Morales    Procedure Summary       Date: 01/16/24 Room / Location: [facility] OR 19 [facility] [place] [facility]    Anesthesia Start: 1517 Anesthesia Stop: 1807    Procedure: CEREBRAL ANGIOGRAM WITH EMBOLIZATION Diagnosis:     Surgeons: Angelito Gamble MD Provider: Jose Mcknight MD    Anesthesia Type: general ASA Status: 4 - Emergent            Anesthesia Type: general    Vitals  Vitals Value Taken Time   /72 01/16/24 1930   Temp 37.7 °C (99.8 °F) 01/16/24 1805   Pulse 88 01/16/24 1941   Resp 16 01/16/24 1930   SpO2 96 % 01/16/24 1941   Vitals shown include unfiled device data.        Post Anesthesia Care and Evaluation    Patient location during evaluation: bedside  Patient participation: complete - patient participated  Level of consciousness: awake and alert  Pain management: adequate    Airway patency: patent  Anesthetic complications: No anesthetic complications  PONV Status: controlled  Cardiovascular status: blood pressure returned to baseline and acceptable  Respiratory status: acceptable  Hydration status: acceptable

## 2024-01-17 NOTE — PROGRESS NOTES
[facility] Acute Inpt Rehab Note     Referral received via stroke order set.  Please note this is a screening only, rehab admissions will not actively be evaluating this patient.  If felt patient is appropriate for our services once therapies begin, please call our office at [phone], to initiate a full referral.    Thank you,   Cristy Shaikh RN   Rehab Admission Nurse

## 2024-01-18 ENCOUNTER — APPOINTMENT (OUTPATIENT)
Dept: CT IMAGING | Facility: HOSPITAL | Age: 59
DRG: 020 | End: 2024-01-18
Payer: COMMERCIAL

## 2024-01-18 ENCOUNTER — APPOINTMENT (OUTPATIENT)
Dept: GENERAL RADIOLOGY | Facility: HOSPITAL | Age: 59
DRG: 020 | End: 2024-01-18
Payer: COMMERCIAL

## 2024-01-18 LAB
ANION GAP SERPL CALCULATED.3IONS-SCNC: 12 MMOL/L (ref 5–15)
APPEARANCE CSF: ABNORMAL
BUN SERPL-MCNC: 15 MG/DL (ref 6–20)
BUN/CREAT SERPL: 19.7 (ref 7–25)
CALCIUM SPEC-SCNC: 9.9 MG/DL (ref 8.6–10.5)
CHLORIDE SERPL-SCNC: 106 MMOL/L (ref 98–107)
CHOLEST SERPL-MCNC: 143 MG/DL (ref 0–200)
CO2 SERPL-SCNC: 22 MMOL/L (ref 22–29)
COLOR CSF: ABNORMAL
CREAT SERPL-MCNC: 0.76 MG/DL (ref 0.57–1)
DEPRECATED RDW RBC AUTO: 38.9 FL (ref 37–54)
EGFRCR SERPLBLD CKD-EPI 2021: 90.4 ML/MIN/1.73
ERYTHROCYTE [DISTWIDTH] IN BLOOD BY AUTOMATED COUNT: 12.6 % (ref 12.3–15.4)
GLUCOSE BLDC GLUCOMTR-MCNC: 122 MG/DL (ref 70–130)
GLUCOSE SERPL-MCNC: 124 MG/DL (ref 65–99)
HBA1C MFR BLD: 5.8 % (ref 4.8–5.6)
HCT VFR BLD AUTO: 40.1 % (ref 34–46.6)
HDLC SERPL-MCNC: 41 MG/DL (ref 40–60)
HGB BLD-MCNC: 13.7 G/DL (ref 12–15.9)
LDLC SERPL CALC-MCNC: 80 MG/DL (ref 0–100)
LDLC/HDLC SERPL: 1.9 {RATIO}
MCH RBC QN AUTO: 29.1 PG (ref 26.6–33)
MCHC RBC AUTO-ENTMCNC: 34.2 G/DL (ref 31.5–35.7)
MCV RBC AUTO: 85.3 FL (ref 79–97)
METHOD: ABNORMAL
NUC CELL # CSF MANUAL: 4 /MM3 (ref 0–5)
PLATELET # BLD AUTO: 317 10*3/MM3 (ref 140–450)
PMV BLD AUTO: 9.1 FL (ref 6–12)
POTASSIUM SERPL-SCNC: 4.2 MMOL/L (ref 3.5–5.2)
RBC # BLD AUTO: 4.7 10*6/MM3 (ref 3.77–5.28)
RBC # CSF MANUAL: ABNORMAL /MM3 (ref 0–0)
SODIUM SERPL-SCNC: 140 MMOL/L (ref 136–145)
TRIGL SERPL-MCNC: 120 MG/DL (ref 0–150)
TUBE # CSF: 4
VLDLC SERPL-MCNC: 22 MG/DL (ref 5–40)
WBC NRBC COR # BLD AUTO: 14.91 10*3/MM3 (ref 3.4–10.8)

## 2024-01-18 PROCEDURE — B01BZZZ FLUOROSCOPY OF SPINAL CORD: ICD-10-PCS | Performed by: RADIOLOGY

## 2024-01-18 PROCEDURE — 83036 HEMOGLOBIN GLYCOSYLATED A1C: CPT | Performed by: INTERNAL MEDICINE

## 2024-01-18 PROCEDURE — 89050 BODY FLUID CELL COUNT: CPT | Performed by: INTERNAL MEDICINE

## 2024-01-18 PROCEDURE — 82948 REAGENT STRIP/BLOOD GLUCOSE: CPT

## 2024-01-18 PROCEDURE — 25010000002 LIDOCAINE 1 % SOLUTION: Performed by: INTERNAL MEDICINE

## 2024-01-18 PROCEDURE — 009U3ZX DRAINAGE OF SPINAL CANAL, PERCUTANEOUS APPROACH, DIAGNOSTIC: ICD-10-PCS | Performed by: RADIOLOGY

## 2024-01-18 PROCEDURE — 80061 LIPID PANEL: CPT | Performed by: INTERNAL MEDICINE

## 2024-01-18 PROCEDURE — 97166 OT EVAL MOD COMPLEX 45 MIN: CPT

## 2024-01-18 PROCEDURE — 25010000002 CEFTRIAXONE PER 250 MG: Performed by: INTERNAL MEDICINE

## 2024-01-18 PROCEDURE — 97162 PT EVAL MOD COMPLEX 30 MIN: CPT

## 2024-01-18 PROCEDURE — 25010000002 LEVETRIRACETAM PER 10 MG: Performed by: INTERNAL MEDICINE

## 2024-01-18 PROCEDURE — 70450 CT HEAD/BRAIN W/O DYE: CPT

## 2024-01-18 PROCEDURE — 80048 BASIC METABOLIC PNL TOTAL CA: CPT | Performed by: INTERNAL MEDICINE

## 2024-01-18 PROCEDURE — 85027 COMPLETE CBC AUTOMATED: CPT | Performed by: INTERNAL MEDICINE

## 2024-01-18 PROCEDURE — 97110 THERAPEUTIC EXERCISES: CPT

## 2024-01-18 RX ORDER — LIDOCAINE HYDROCHLORIDE 10 MG/ML
10 INJECTION, SOLUTION INFILTRATION; PERINEURAL ONCE
Status: COMPLETED | OUTPATIENT
Start: 2024-01-18 | End: 2024-01-18

## 2024-01-18 RX ADMIN — ACETAMINOPHEN 650 MG: 325 TABLET, FILM COATED ORAL at 19:15

## 2024-01-18 RX ADMIN — Medication 10 ML: at 09:32

## 2024-01-18 RX ADMIN — NIMODIPINE 60 MG: 30 CAPSULE, LIQUID FILLED ORAL at 12:48

## 2024-01-18 RX ADMIN — LEVETIRACETAM 500 MG: 500 INJECTION, SOLUTION INTRAVENOUS at 09:43

## 2024-01-18 RX ADMIN — NIMODIPINE 60 MG: 30 CAPSULE, LIQUID FILLED ORAL at 04:59

## 2024-01-18 RX ADMIN — NIMODIPINE 60 MG: 30 CAPSULE, LIQUID FILLED ORAL at 09:43

## 2024-01-18 RX ADMIN — ATORVASTATIN CALCIUM 20 MG: 20 TABLET, FILM COATED ORAL at 20:21

## 2024-01-18 RX ADMIN — ASPIRIN 81 MG: 81 TABLET, COATED ORAL at 09:43

## 2024-01-18 RX ADMIN — Medication 10 ML: at 20:22

## 2024-01-18 RX ADMIN — NIMODIPINE 60 MG: 30 CAPSULE, LIQUID FILLED ORAL at 20:21

## 2024-01-18 RX ADMIN — LEVETIRACETAM 500 MG: 500 INJECTION, SOLUTION INTRAVENOUS at 20:30

## 2024-01-18 RX ADMIN — LIDOCAINE HYDROCHLORIDE 3 ML: 10 INJECTION, SOLUTION INFILTRATION; PERINEURAL at 11:52

## 2024-01-18 RX ADMIN — CEFTRIAXONE SODIUM 1000 MG: 1 INJECTION, POWDER, FOR SOLUTION INTRAMUSCULAR; INTRAVENOUS at 06:59

## 2024-01-18 RX ADMIN — NIMODIPINE 60 MG: 30 CAPSULE, LIQUID FILLED ORAL at 16:15

## 2024-01-18 NOTE — THERAPY EVALUATION
Patient Name: Binta Morales  : 1965    MRN: [id number]                              Today's Date: 2024       Admit Date: 2024    Visit Dx:     ICD-10-CM ICD-9-CM   1. SAH (subarachnoid hemorrhage)  I60.9 430     Patient Active Problem List   Diagnosis    SAH (subarachnoid hemorrhage)    Ruptured aneurysm of anterior communicating artery    Communicating hydrocephalus     Past Medical History:   Diagnosis Date    Hypertension      Past Surgical History:   Procedure Laterality Date    EMBOLIZATION CEREBRAL N/A 2024    Procedure: CEREBRAL ANGIOGRAM WITH EMBOLIZATION;  Surgeon: Angelito Gamble MD;  Location: [facility] ;  Service: Interventional Radiology;  Laterality: N/A;      General Information       Row Name 24 1533          OT Time and Intention    Document Type evaluation  -MW     Mode of Treatment co-treatment;occupational therapy;physical therapy  to maximize therapeutic benefit  -MW       Row Name 24 1533          General Information    Patient Profile Reviewed yes  -MW     Prior Level of Function independent:;ADL's;transfer;all household mobility  no AD  -MW     Existing Precautions/Restrictions fall  -MW     Barriers to Rehab none identified  -MW       Row Name 24 1533          Living Environment    People in Home spouse  -MW       Row Name 24 1533          Home Main Entrance    Number of Stairs, Main Entrance none  -MW       Row Name 24 1533          Cognition    Orientation Status (Cognition) oriented x 4  -MW       Row Name 24 1533          Safety Issues, Functional Mobility    Safety Issues Affecting Function (Mobility) insight into deficits/self-awareness;safety precautions follow-through/compliance;safety precaution awareness  -MW     Impairments Affecting Function (Mobility) endurance/activity tolerance;strength;pain;balance  -MW               User Key  (r) = Recorded By, (t) = Taken By, (c) = Cosigned By      Initials Name  Provider Type     Berkley Carpenter OT Occupational Therapist                     Mobility/ADL's       Row Name 01/18/24 1534          Bed Mobility    Bed Mobility supine-sit  -     Supine-Sit [place] (Bed Mobility) contact guard;verbal cues;nonverbal cues (demo/gesture)  -     Comment, (Bed Mobility) [facility] end of session  -[facility] Name 01/18/24 1534          Transfers    Transfers sit-stand transfer;toilet transfer  -MW       Row Name 01/18/24 1534          Sit-Stand Transfer    Sit-Stand [place] (Transfers) minimum assist (75% patient effort);verbal cues;nonverbal cues (demo/gesture)  -     Comment, (Sit-Stand Transfer) no AD  -MW       Row Name 01/18/24 1534          Toilet Transfer    Comment, (Toilet Transfer) pt's sister reports that the patient has been up to commode within room prior to session  -[facility] Name 01/18/24 1534          Functional Mobility    Functional Mobility- Ind. Level minimum assist (75% patient effort)  -MW       Row Name 01/18/24 1534          Activities of Daily Living    BADL Assessment/Intervention lower body dressing;feeding  -MW       Row Name 01/18/24 1534          Lower Body Dressing Assessment/Training    [place] Level (Lower Body Dressing) don;socks;standby assist  -     Position (Lower Body Dressing) long sitting  -MW       Row Name 01/18/24 1534          Self-Feeding Assessment/Training    Comment, (Feeding) hand to mouth [facility]               User Key  (r) = Recorded By, (t) = Taken By, (c) = Cosigned By      Initials Name Provider Type     Berkley Carpenter OT Occupational Therapist                   Obj/Interventions       [facility] Name 01/18/24 1535          Sensory Assessment (Somatosensory)    Sensory Assessment (Somatosensory) UE sensation intact  -MW       Row Name 01/18/24 1535          Vision Assessment/Intervention    Visual Impairment/Limitations [facility]     Vision Assessment Comment light sensitivity  -MW       Row Name 01/18/24  1535          Range of Motion Comprehensive    General Range of Motion no range of motion deficits identified  -MW       Row Name 01/18/24 1535          Strength Comprehensive (MMT)    General Manual Muscle Testing (MMT) Assessment upper extremity strength deficits identified  -MW     Comment, General Manual Muscle Testing (MMT) Assessment generalized weakness, grossly 4/5 BUE  -MW       Row Name 01/18/24 1535          Motor Skills    Motor Skills functional endurance;coordination  -     Coordination WFL;bilateral;upper extremity  -     Functional Endurance fair  -       Row Name 01/18/24 1535          Balance    Balance Assessment sitting static balance;sitting dynamic balance;sit to stand dynamic balance;standing dynamic balance;standing static balance  -     Static Sitting Balance supervision  -     Dynamic Sitting Balance standby assist  -MW     Position, Sitting Balance unsupported;sitting edge of bed  -     Sit to Stand Dynamic Balance minimal assist;contact guard  -     Static Standing Balance contact guard;minimal assist  -MW     Dynamic Standing Balance minimal assist  -MW     Position/Device Used, Standing Balance supported;other (see comments)  HHAx1  -MW     Balance Interventions sitting;sit to stand;standing;supported;minimal challenge;static;dynamic;weight shifting activity  -     Comment, Balance mildly unsteady  -               User Key  (r) = Recorded By, (t) = Taken By, (c) = Cosigned By      Initials Name Provider Type    MW Berkley Carpenter OT Occupational Therapist                   Goals/Plan       Row Name 01/18/24 1539          Transfer Goal 1 (OT)    Activity/Assistive Device (Transfer Goal 1, OT) transfers, all  -     [place] Level/Cues Needed (Transfer Goal 1, OT) independent  -MW     Time Frame (Transfer Goal 1, OT) short term goal (STG);2 weeks  -     Progress/Outcome (Transfer Goal 1, OT) goal ongoing  -       Row Name 01/18/24 1539          Dressing Goal  1 (OT)    Activity/Device (Dressing Goal 1, OT) dressing skills, all  -MW     [place]/Cues Needed (Dressing Goal 1, OT) independent  -MW     Time Frame (Dressing Goal 1, OT) 2 weeks;short term goal (STG)  -MW     Progress/Outcome (Dressing Goal 1, OT) goal ongoing  -       Row Name 01/18/24 1539          Toileting Goal 1 (OT)    Activity/Device (Toileting Goal 1, OT) toileting skills, all  -MW     [place] Level/Cues Needed (Toileting Goal 1, OT) independent  -MW     Time Frame (Toileting Goal 1, OT) short term goal (STG);2 weeks  -MW     Progress/Outcome (Toileting Goal 1, OT) goal ongoing  -       Row Name 01/18/24 1539          Grooming Goal 1 (OT)    Activity/Device (Grooming Goal 1, OT) grooming skills, all  -MW     [place] (Grooming Goal 1, OT) independent  -MW     Time Frame (Grooming Goal 1, OT) short term goal (STG);2 weeks  -MW     Progress/Outcome (Grooming Goal 1, OT) goal ongoing  -       Row Name 01/18/24 1536          Therapy Assessment/Plan (OT)    Planned Therapy Interventions (OT) activity tolerance training;neuromuscular control/coordination retraining;transfer/mobility retraining;ROM/therapeutic exercise;occupation/activity based interventions;functional balance retraining  -MW               User Key  (r) = Recorded By, (t) = Taken By, (c) = Cosigned By      Initials Name Provider Type    Berkley Harmon, OT Occupational Therapist                   Clinical Impression       Row Name 01/18/24 1536          Pain Assessment    Pretreatment Pain Rating 6/10  -MW     Posttreatment Pain Rating 6/10  -MW     Pain Location - head  -MW       Row Name 01/18/24 1536          Plan of Care Review    Plan of Care Reviewed With patient;family  -MW     Progress no change  -MW     Outcome Evaluation Pt is a 57 yo female admitted for SAH secondary to ruptured ACOM aneurysm s/p embolization and successful web placement. Pt seen this date for OT eval in ICU, she reports (I) at baseline with  no AD, lives with spouse. Today, pt presents minimally below her baseline with impaired balance, activity tolerance and pain reported in head. RN aware. She donned socks with s/up and SBA in bed, CGA/min A for STS and functional mobility with HHA to chair, she is overall limited by headache this date. She will continue to benefit from skilled OT to address noted functional deficits to ensure all needs met for d/c. Plans home vs rehab pending progress.  -       Row Name 01/18/24 1536          Therapy Assessment/Plan (OT)    Rehab Potential (OT) good, to achieve stated therapy goals  -     Criteria for Skilled Therapeutic Interventions Met (OT) yes;meets criteria;skilled treatment is necessary  -     Therapy Frequency (OT) 5 times/wk  -       Row Name 01/18/24 1536          Therapy Plan Review/Discharge Plan (OT)    Anticipated Discharge Disposition (OT) home;home with assist;skilled nursing facility  pending  -       Row Name 01/18/24 1536          Vital Signs    O2 Delivery Pre Treatment room air  -       Row Name 01/18/24 1536          Positioning and Restraints    Pre-Treatment Position in bed  -     Post Treatment Position chair  -MW     In Chair notified nsg;reclined;call light within reach;encouraged to call for assist;with family/caregiver  Rn aware of no chair alarm box, pad in place  -               User Key  (r) = Recorded By, (t) = Taken By, (c) = Cosigned By      Initials Name Provider Type    Berkley Harmon, OT Occupational Therapist                   Outcome Measures       Row Name 01/18/24 153          How much help from another is currently needed...    Putting on and taking off regular lower body clothing? 3  -MW     Bathing (including washing, rinsing, and drying) 3  -MW     Toileting (which includes using toilet bed pan or urinal) 3  -MW     Putting on and taking off regular upper body clothing 4  -MW     Taking care of personal grooming (such as brushing teeth) 4  -MW      Eating meals 4  -MW     AM-PAC 6 Clicks Score (OT) 21  -MW       Row Name 01/18/24 1150          How much help from another person do you currently need...    Turning from your back to your side while in flat bed without using bedrails? 4  -MS     Moving from lying on back to sitting on the side of a flat bed without bedrails? 4  -MS     Moving to and from a bed to a chair (including a wheelchair)? 3  -MS     Standing up from a chair using your arms (e.g., wheelchair, bedside chair)? 3  -MS     Climbing 3-5 steps with a railing? 2  -MS     To walk in hospital room? 2  -MS     AM-PAC 6 Clicks Score (PT) 18  -MS     Highest Level of Mobility Goal 6 --> Walk 10 steps or more  -MS       Row Name 01/18/24 1539          Modified Hiram Scale    Modified Matt Scale 2 - Slight disability.  Unable to carry out all previous activities but able to look after own affairs without assistance.  -       Row Name 01/18/24 1539          Functional Assessment    Outcome Measure Options AM-PAC 6 Clicks Daily Activity (OT);Modified Hiram  -               User Key  (r) = Recorded By, (t) = Taken By, (c) = Cosigned By      Initials Name Provider Type    MS KauffmanJuana, PT Physical Therapist    Berkley Harmon OT Occupational Therapist                    Occupational Therapy Education       Title: PT OT SLP Therapies (Done)       Topic: Occupational Therapy (Done)       Point: ADL training (Done)       Description:   Instruct learner(s) on proper safety adaptation and remediation techniques during self care or transfers.   Instruct in proper use of assistive devices.                  Learning Progress Summary             Patient Acceptance, E, VU by CLYDE at 1/18/2024 1540    Comment: role of OTlenchoc rec, GOC   Family Acceptance, E, VU by CLYDE at 1/18/2024 1540    Comment: role of OT dSonjac rec, [facility]                         Point: Home exercise program (Done)       Description:   Instruct learner(s) on appropriate technique for  monitoring, assisting and/or progressing therapeutic exercises/activities.                  Learning Progress Summary             Patient Acceptance, E, VU by  at 1/18/2024 1540    Comment: role of mikel FIELDS GOWANDY   Family Acceptance, E, VU by  at 1/18/2024 1540    Comment: role of mikel FIELDS GOC                         Point: Precautions (Done)       Description:   Instruct learner(s) on prescribed precautions during self-care and functional transfers.                  Learning Progress Summary             Patient Acceptance, E, VU by  at 1/18/2024 1540    Comment: role of mikel FIELDS rec GOC   Family Acceptance, E, VU by  at 1/18/2024 1540    Comment: role of mikel FIELDS GOC                         Point: Body mechanics (Done)       Description:   Instruct learner(s) on proper positioning and spine alignment during self-care, functional mobility activities and/or exercises.                  Learning Progress Summary             Patient Acceptance, E, VU by  at 1/18/2024 1540    Comment: role of mikel FIELDS GOWANDY   Family Acceptance, E, VU by  at 1/18/2024 1540    Comment: role of mikel FIELDS GO                                         User Key       Initials Effective Dates Name Provider Type Discipline     08/20/21 -  Berkley Carpenter OT Occupational Therapist OT                  OT Recommendation and Plan  Planned Therapy Interventions (OT): activity tolerance training, neuromuscular control/coordination retraining, transfer/mobility retraining, ROM/therapeutic exercise, occupation/activity based interventions, functional balance retraining  Therapy Frequency (OT): 5 times/wk  Plan of Care Review  Plan of Care Reviewed With: patient, family  Progress: no change  Outcome Evaluation: Pt is a 59 yo female admitted for SAH secondary to ruptured ACOM aneurysm s/p embolization and successful web placement. Pt seen this date for OT eval in ICU, she reports (I) at baseline with no AD, lives with spouse.  Today, pt presents minimally below her baseline with impaired balance, activity tolerance and pain reported in head. RN aware. She donned socks with s/up and SBA in bed, CGA/min A for STS and functional mobility with HHA to chair, she is overall limited by headache this date. She will continue to benefit from skilled OT to address noted functional deficits to ensure all needs met for d/c. Plans home vs rehab pending progress.     Time Calculation:   Evaluation Complexity (OT)  Review Occupational Profile/Medical/Therapy History Complexity: expanded/moderate complexity  Assessment, Occupational Performance/Identification of Deficit Complexity: 3-5 performance deficits  Clinical Decision Making Complexity (OT): detailed assessment/moderate complexity  Overall Complexity of Evaluation (OT): moderate complexity     Time Calculation- OT       Row Name 01/18/24 1540             Time Calculation- OT    OT Start Time 1020  -MW      OT Stop Time 1033  -MW      OT Time Calculation (min) 13 min  -MW      OT Received On 01/18/24  -MW      OT - Next Appointment 01/19/24  -MW      OT Goal Re-Cert Due Date 02/01/24  -MW         Untimed Charges    OT Eval/Re-eval Minutes 13  -MW         Total Minutes    Untimed Charges Total Minutes 13  -MW       Total Minutes 13  -MW                User Key  (r) = Recorded By, (t) = Taken By, (c) = Cosigned By      Initials Name Provider Type    Berkley Harmon OT Occupational Therapist                  Therapy Charges for Today       Code Description Service Date Service Provider Modifiers Qty    [id number] HC OT EVAL MOD COMPLEXITY 3 1/18/2024 Berkley Carpenter OT GO 1                 Berkley Carpenter OT  1/18/2024

## 2024-01-18 NOTE — PROGRESS NOTES
[facility], [place] NEUROSURGERY INTRACRANIAL PROGRESS NOTE    PATIENT IDENTIFICATION:   Name:  Binta Morales      MRN:  [id number]     58 y.o.  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  Has complaints of headache.    ROS:  Constitutional: No fever, chills  HEENT: No headache, no vision changes, no tinnitus, no hearing difficulties  Neck: no stiffness  GI: No nausea, vomiting, no swallow difficulties  Neuro: No numbness, tingling, or weakness, no speech difficulties, no balance difficulties    Objective     Vital signs in last 24 hours:  Temp:  [97.5 °F (36.4 °C)-101.6 °F (38.7 °C)] 100.1 °F (37.8 °C)  Heart Rate:  [69-97] 80  BP: ()/(51-81) 146/80    Intake/Output this shift:  No intake/output data recorded.    Intake/Output last 3 shifts:  I/O last 3 completed shifts:  In: 662.8 [I.V.:662.8]  Out: 1600 [Urine:1600]    LABS:  Results from last 7 days   Lab Units 01/18/24  0527 01/17/24  0611 01/16/24  1138   WBC 10*3/mm3 14.91* 11.33* 12.30*   HEMOGLOBIN g/dL 13.7 12.9 15.7   HEMATOCRIT % 40.1 38.0 47.2*   PLATELETS 10*3/mm3 317 164 373       Results from last 7 days   Lab Units 01/18/24 0527 01/17/24  0611 01/16/24  1138   SODIUM mmol/L 140 136 138   POTASSIUM mmol/L 4.2 2.7* 3.4*   CHLORIDE mmol/L 106 102 98   CO2 mmol/L 22.0 20.0* 22.0   BUN mg/dL 15 13 20   CREATININE mg/dL 0.76 0.63 0.87   GLUCOSE mg/dL 124* 122* 112*   CALCIUM mg/dL 9.9 8.8 10.3          IMAGING STUDIES:  CT Head Without Contrast    Result Date: 1/18/2024  Electronically signed by Lux Monahan M.D. on 01-18-24 at 0612    XR Chest 1 View    Result Date: 1/17/2024  1. There is a small patchy airspace opacity at the left apex that is new when compared to chest x-ray earlier today 01/17/2024. I favor it is a localized area of atelectasis over pneumonia but correlate  clinically and follow-up chest x-ray could be obtained to reevaluate if clinically indicated. The remainder of  the chest x-ray is normal.  This report was finalized on 1/17/2024 10:08 PM by Dr. Franklyn Baltazar M.D on Workstation: [id number]      XR Chest 1 View    Result Date: 1/17/2024  1. No acute process.   This report was finalized on 1/17/2024 3:14 PM by Dr. Travon Shankar M.D on Workstation: [id number]      Arteriogram (Powerscribe)    Result Date: 1/17/2024  1. 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 Follow-up angiography in 6 months. 2. No significant stenosis or narrowing within the cerebrovascular circulation. There is a patent, but hypoplastic A1 segment on the right.  3. No additional aneurysms, hypervascular lesions or evidence of AV shunting.  All carotid stenosis measurements were made using NASCET criteria.   This report was finalized on 1/17/2024 1:02 PM by Dr. Angelito Gamble M.D on Workstation: [id number]      CT Head Without Contrast    Result Date: 1/17/2024  Electronically signed by Rhianna Chi MD on 01-17-24 at 0524    CT Head Without Contrast    Result Date: 1/16/2024  The patient is status post endovascular embolization of the left A1/A2 aneurysm. Subarachnoid blood appears similar in volume as compared to the study from earlier same day with an element of redistribution. There is no evidence of new hemorrhage or of acute infarction. The temporal horns are mildly prominent, unchanged.  Radiation dose reduction techniques were utilized, including automated exposure control and exposure modulation based on body size.   This report was finalized on 1/16/2024 8:58 PM by Dr. Faizan Nicole M.D on Workstation: [id number]      CT Angiogram Head    Result Date: 1/16/2024  1.  There is an ovoid 5 mm aneurysm arising from the left A1-A2 junction. It measures approximately 5 mm in size. The neck is not  optimally visualized but likely measures 3 mm. The aneurysm is irregular anteriorly and projects anteriorly, medially and inferiorly. Note is made that the right A1 segment is hypoplastic. 2.  The volume of subarachnoid blood as well as mild prominence of the temporal horns is unchanged as compared to the earlier study.  The above information was called to and discussed with RENNY Jean.   Radiation dose reduction techniques were utilized, including automated exposure control and exposure modulation based on body size.   This report was finalized on 1/16/2024 7:28 PM by Dr. Faizan Nicole M.D on Workstation: [email or web address]      CT Angiogram Neck    Result Date: 1/16/2024  1.  There is an ovoid 5 mm aneurysm arising from the left A1-A2 junction. It measures approximately 5 mm in size. The neck is not optimally visualized but likely measures 3 mm. The aneurysm is irregular anteriorly and projects anteriorly, medially and inferiorly. Note is made that the right A1 segment is hypoplastic. 2.  The volume of subarachnoid blood as well as mild prominence of the temporal horns is unchanged as compared to the earlier study.  The above information was called to and discussed with RENNY Jean.   Radiation dose reduction techniques were utilized, including automated exposure control and exposure modulation based on body size.   This report was finalized on 1/16/2024 7:28 PM by Dr. Faizan Nicole M.D on Workstation: [id number]      CT Head Without Contrast    Result Date: 1/16/2024  1. There is a small scalp hematoma over the anterior inferior right frontal bone extending into the right periorbital region and no underlying acute skull fracture is identified. 2. There is acute subarachnoid hemorrhage filling the anterior right sylvian fissure tracking adjacent to the right middle cerebral artery trifurcation and along the margins of the right M1 segment and also extending into the suprasellar cistern along the margins  of the [place] and tracking along the margin of the left M1 segment into the anterior left sylvian fissure as well as tracking along the right petroclinoid ligament into the right perimesencephalic region along the anterolateral margin of the right side of the maynor and midbrain and into the prepontine cistern.  There are tiny foci of intraventricular hemorrhage in the fourth ventricle and posterior third ventricle and posterior tip of the trigone of the right lateral ventricle. The lateral and third ventricles are very minimally prominent in size and the temporal horns are slightly prominent in size, may be upper limits of normal although very mild hydrocephalus is certainly not excluded. The pattern of the subarachnoid hemorrhage raises the potential of a ruptured aneurysm and I recommend neurosurgical consultation as well as a CT angiogram of the head and ultimately a 6 vessel cerebral arteriogram to further evaluate. 2. There is partial opacification of the ethmoid sinuses, posterior sphenoid sinuses and posterior right maxillary sinus with fluid.  The fluid in the right maxillary sinus is minimally hyperdense, could be some hemorrhagic fluid. The remainder of the head CT is within normal limits.   CT OF THE FACIAL BONES: Spiral CT images were obtained through the facial bones in the axial imaging plane and the images were reformatted and submitted in 2 mm thick axial, sagittal and coronal CT sections with soft tissue algorithm and 1 mm thick axial, sagittal and coronal CT sections with high-resolution bone algorithm.  FINDINGS: There is a small scalp hematoma over the anterior inferior right frontal bone extending into the right periorbital region. No underlying skull fracture is seen. There is partial opacification of the frontal recesses, ethmoid sinuses and posterior sphenoid sinuses with fluid as well as posterior and inferior right maxillary sinus with fluid and mild mucosal thickening in the  inferior medial left maxillary sinus. There is some fluid in the superior nasal cavities. The fluid in the posterior right maxillary sinus is minimally hyperdense and may be hemorrhagic. I see no convincing acute facial fracture.  Reidentified is the subarachnoid hemorrhage in the sylvian fissures and suprasellar cistern and perimesencephalic region.  IMPRESSION: 1. There is a small scalp hematoma over the anterior inferior right frontal bone extending into the right periorbital region. No convincing acute facial fracture is identified. There is partial opacification of the frontal recesses, ethmoid sinuses, posterior sphenoid sinuses bilaterally and posterior right maxillary sinus with fluid, and the fluid in the posterior right maxillary sinus is minimally hyperdense, may be hemorrhagic. 2. Reidentified is acute subarachnoid hemorrhage in the sylvian fissures, right greater than left, suprasellar cistern and right perimesencephalic region and a small amount of intraventricular hemorrhage in the posterior fourth ventricle as better described above. The pattern of subarachnoid hemorrhage raises the possibility of a ruptured aneurysm and neurosurgical consultation is warranted in further evaluation with CTA of the head and ultimately a 6 vessel cerebral arteriogram is recommended.   CERVICAL SPINE CT TECHNIQUE: Spiral CT images were obtained from the skull base down to the mid body of the T3 thoracic level. The images were reformatted and are submitted in 2 mm thick axial and sagittal CT sections with soft tissue algorithm and 1 mm thick axial, sagittal and coronal CT sections with high-resolution bone algorithm.  FINDINGS: The atlantooccipital articulation is within normal limits.  At C1-2 there are minimal arthritic changes at the atlantodental interval. Otherwise, the C1-2 level is normal in appearance.  At C2-3, the disc space, facets and uncovertebral joints are normal in appearance with no canal or foraminal  narrowing.  At C3-4 there is mild disc space narrowing, degenerative endplate changes and minimal posterior spurring, but no canal narrowing.  There is minimal right and there is moderate left facet overgrowth and some left-sided uncovertebral joint hypertrophy and there is moderate left and no right foraminal narrowing.  At C4-5 there is disc space narrowing and degenerative endplate changes.  There is a mild diffuse posterior disc osteophyte complex and mild canal narrowing.  There is some uncovertebral joint spurring into the foramina and there is mild left and there is mild-to-moderate right bony foraminal narrowing.  At C5-6 there is mild disc space narrowing, degenerative endplate changes, mild posterior spurring and mild canal narrowing. The facets are normal.  There is some bilateral uncovertebral joint hypertrophy and there is mild right and mild-to-moderate left bony foraminal narrowing.  At C6-7 there is mild disc space narrowing, degenerative endplate changes, mild posterior spurring and only minimal if any canal narrowing.  The facets are normal.  There is some bilateral uncovertebral joint hypertrophy and there is mild bilateral bony foraminal narrowing.  At C7-T1 the posterior endplates and facets are normal with no canal or foraminal narrowing.  No acute fracture is seen in the cervical spine.  IMPRESSION:  1. No acute fracture is seen in the cervical spine.  There is cervical spondylosis as described.  Radiation dose reduction techniques were utilized, including automated exposure control and exposure modulation based on body size.   This report was finalized on 1/16/2024 2:00 PM by Dr. Franklyn Baltazar M.D on Workstation: [id number]      CT Cervical Spine Without Contrast    Result Date: 1/16/2024  1. There is a small scalp hematoma over the anterior inferior right frontal bone extending into the right periorbital region and no underlying acute skull fracture is identified. 2. There is acute subarachnoid  hemorrhage filling the anterior right sylvian fissure tracking adjacent to the right middle cerebral artery trifurcation and along the margins of the right M1 segment and also extending into the suprasellar cistern along the margins of the [place] and tracking along the margin of the left M1 segment into the anterior left sylvian fissure as well as tracking along the right petroclinoid ligament into the right perimesencephalic region along the anterolateral margin of the right side of the maynor and midbrain and into the prepontine cistern.  There are tiny foci of intraventricular hemorrhage in the fourth ventricle and posterior third ventricle and posterior tip of the trigone of the right lateral ventricle. The lateral and third ventricles are very minimally prominent in size and the temporal horns are slightly prominent in size, may be upper limits of normal although very mild hydrocephalus is certainly not excluded. The pattern of the subarachnoid hemorrhage raises the potential of a ruptured aneurysm and I recommend neurosurgical consultation as well as a CT angiogram of the head and ultimately a 6 vessel cerebral arteriogram to further evaluate. 2. There is partial opacification of the ethmoid sinuses, posterior sphenoid sinuses and posterior right maxillary sinus with fluid.  The fluid in the right maxillary sinus is minimally hyperdense, could be some hemorrhagic fluid. The remainder of the head CT is within normal limits.   CT OF THE FACIAL BONES: Spiral CT images were obtained through the facial bones in the axial imaging plane and the images were reformatted and submitted in 2 mm thick axial, sagittal and coronal CT sections with soft tissue algorithm and 1 mm thick axial, sagittal and coronal CT sections with high-resolution bone algorithm.  FINDINGS: There is a small scalp hematoma over the anterior inferior right frontal bone extending into the right periorbital region. No underlying skull  fracture is seen. There is partial opacification of the frontal recesses, ethmoid sinuses and posterior sphenoid sinuses with fluid as well as posterior and inferior right maxillary sinus with fluid and mild mucosal thickening in the inferior medial left maxillary sinus. There is some fluid in the superior nasal cavities. The fluid in the posterior right maxillary sinus is minimally hyperdense and may be hemorrhagic. I see no convincing acute facial fracture.  Reidentified is the subarachnoid hemorrhage in the sylvian fissures and suprasellar cistern and perimesencephalic region.  IMPRESSION: 1. There is a small scalp hematoma over the anterior inferior right frontal bone extending into the right periorbital region. No convincing acute facial fracture is identified. There is partial opacification of the frontal recesses, ethmoid sinuses, posterior sphenoid sinuses bilaterally and posterior right maxillary sinus with fluid, and the fluid in the posterior right maxillary sinus is minimally hyperdense, may be hemorrhagic. 2. Reidentified is acute subarachnoid hemorrhage in the sylvian fissures, right greater than left, suprasellar cistern and right perimesencephalic region and a small amount of intraventricular hemorrhage in the posterior fourth ventricle as better described above. The pattern of subarachnoid hemorrhage raises the possibility of a ruptured aneurysm and neurosurgical consultation is warranted in further evaluation with CTA of the head and ultimately a 6 vessel cerebral arteriogram is recommended.   CERVICAL SPINE CT TECHNIQUE: Spiral CT images were obtained from the skull base down to the mid body of the T3 thoracic level. The images were reformatted and are submitted in 2 mm thick axial and sagittal CT sections with soft tissue algorithm and 1 mm thick axial, sagittal and coronal CT sections with high-resolution bone algorithm.  FINDINGS: The atlantooccipital articulation is within normal limits.  At  C1-2 there are minimal arthritic changes at the atlantodental interval. Otherwise, the C1-2 level is normal in appearance.  At C2-3, the disc space, facets and uncovertebral joints are normal in appearance with no canal or foraminal narrowing.  At C3-4 there is mild disc space narrowing, degenerative endplate changes and minimal posterior spurring, but no canal narrowing.  There is minimal right and there is moderate left facet overgrowth and some left-sided uncovertebral joint hypertrophy and there is moderate left and no right foraminal narrowing.  At C4-5 there is disc space narrowing and degenerative endplate changes.  There is a mild diffuse posterior disc osteophyte complex and mild canal narrowing.  There is some uncovertebral joint spurring into the foramina and there is mild left and there is mild-to-moderate right bony foraminal narrowing.  At C5-6 there is mild disc space narrowing, degenerative endplate changes, mild posterior spurring and mild canal narrowing. The facets are normal.  There is some bilateral uncovertebral joint hypertrophy and there is mild right and mild-to-moderate left bony foraminal narrowing.  At C6-7 there is mild disc space narrowing, degenerative endplate changes, mild posterior spurring and only minimal if any canal narrowing.  The facets are normal.  There is some bilateral uncovertebral joint hypertrophy and there is mild bilateral bony foraminal narrowing.  At C7-T1 the posterior endplates and facets are normal with no canal or foraminal narrowing.  No acute fracture is seen in the cervical spine.  IMPRESSION:  1. No acute fracture is seen in the cervical spine.  There is cervical spondylosis as described.  Radiation dose reduction techniques were utilized, including automated exposure control and exposure modulation based on body size.   This report was finalized on 1/16/2024 2:00 PM by Dr. Franklyn Baltazar M.D on Workstation: [id number]      CT Facial Bones Without  Contrast    Result Date: 1/16/2024  1. There is a small scalp hematoma over the anterior inferior right frontal bone extending into the right periorbital region and no underlying acute skull fracture is identified. 2. There is acute subarachnoid hemorrhage filling the anterior right sylvian fissure tracking adjacent to the right middle cerebral artery trifurcation and along the margins of the right M1 segment and also extending into the suprasellar cistern along the margins of the [place] and tracking along the margin of the left M1 segment into the anterior left sylvian fissure as well as tracking along the right petroclinoid ligament into the right perimesencephalic region along the anterolateral margin of the right side of the maynor and midbrain and into the prepontine cistern.  There are tiny foci of intraventricular hemorrhage in the fourth ventricle and posterior third ventricle and posterior tip of the trigone of the right lateral ventricle. The lateral and third ventricles are very minimally prominent in size and the temporal horns are slightly prominent in size, may be upper limits of normal although very mild hydrocephalus is certainly not excluded. The pattern of the subarachnoid hemorrhage raises the potential of a ruptured aneurysm and I recommend neurosurgical consultation as well as a CT angiogram of the head and ultimately a 6 vessel cerebral arteriogram to further evaluate. 2. There is partial opacification of the ethmoid sinuses, posterior sphenoid sinuses and posterior right maxillary sinus with fluid.  The fluid in the right maxillary sinus is minimally hyperdense, could be some hemorrhagic fluid. The remainder of the head CT is within normal limits.   CT OF THE FACIAL BONES: Spiral CT images were obtained through the facial bones in the axial imaging plane and the images were reformatted and submitted in 2 mm thick axial, sagittal and coronal CT sections with soft tissue algorithm and 1  mm thick axial, sagittal and coronal CT sections with high-resolution bone algorithm.  FINDINGS: There is a small scalp hematoma over the anterior inferior right frontal bone extending into the right periorbital region. No underlying skull fracture is seen. There is partial opacification of the frontal recesses, ethmoid sinuses and posterior sphenoid sinuses with fluid as well as posterior and inferior right maxillary sinus with fluid and mild mucosal thickening in the inferior medial left maxillary sinus. There is some fluid in the superior nasal cavities. The fluid in the posterior right maxillary sinus is minimally hyperdense and may be hemorrhagic. I see no convincing acute facial fracture.  Reidentified is the subarachnoid hemorrhage in the sylvian fissures and suprasellar cistern and perimesencephalic region.  IMPRESSION: 1. There is a small scalp hematoma over the anterior inferior right frontal bone extending into the right periorbital region. No convincing acute facial fracture is identified. There is partial opacification of the frontal recesses, ethmoid sinuses, posterior sphenoid sinuses bilaterally and posterior right maxillary sinus with fluid, and the fluid in the posterior right maxillary sinus is minimally hyperdense, may be hemorrhagic. 2. Reidentified is acute subarachnoid hemorrhage in the sylvian fissures, right greater than left, suprasellar cistern and right perimesencephalic region and a small amount of intraventricular hemorrhage in the posterior fourth ventricle as better described above. The pattern of subarachnoid hemorrhage raises the possibility of a ruptured aneurysm and neurosurgical consultation is warranted in further evaluation with CTA of the head and ultimately a 6 vessel cerebral arteriogram is recommended.   CERVICAL SPINE CT TECHNIQUE: Spiral CT images were obtained from the skull base down to the mid body of the T3 thoracic level. The images were reformatted and are submitted  in 2 mm thick axial and sagittal CT sections with soft tissue algorithm and 1 mm thick axial, sagittal and coronal CT sections with high-resolution bone algorithm.  FINDINGS: The atlantooccipital articulation is within normal limits.  At C1-2 there are minimal arthritic changes at the atlantodental interval. Otherwise, the C1-2 level is normal in appearance.  At C2-3, the disc space, facets and uncovertebral joints are normal in appearance with no canal or foraminal narrowing.  At C3-4 there is mild disc space narrowing, degenerative endplate changes and minimal posterior spurring, but no canal narrowing.  There is minimal right and there is moderate left facet overgrowth and some left-sided uncovertebral joint hypertrophy and there is moderate left and no right foraminal narrowing.  At C4-5 there is disc space narrowing and degenerative endplate changes.  There is a mild diffuse posterior disc osteophyte complex and mild canal narrowing.  There is some uncovertebral joint spurring into the foramina and there is mild left and there is mild-to-moderate right bony foraminal narrowing.  At C5-6 there is mild disc space narrowing, degenerative endplate changes, mild posterior spurring and mild canal narrowing. The facets are normal.  There is some bilateral uncovertebral joint hypertrophy and there is mild right and mild-to-moderate left bony foraminal narrowing.  At C6-7 there is mild disc space narrowing, degenerative endplate changes, mild posterior spurring and only minimal if any canal narrowing.  The facets are normal.  There is some bilateral uncovertebral joint hypertrophy and there is mild bilateral bony foraminal narrowing.  At C7-T1 the posterior endplates and facets are normal with no canal or foraminal narrowing.  No acute fracture is seen in the cervical spine.  IMPRESSION:  1. No acute fracture is seen in the cervical spine.  There is cervical spondylosis as described.  Radiation dose reduction techniques  were utilized, including automated exposure control and exposure modulation based on body size.   This report was finalized on 1/16/2024 2:00 PM by Dr. Franklyn Baltazar M.D on Workstation: [id number]         I personally viewed and interpreted the patient's chart.    Meds reviewed    Physical exam  Drowsy, will arise, oriented x3  Pupils equal round reactive to light  Extraocular muscles intact  Face symmetric  Speech is fluent and clear  No pronator drift  Motor exam  Bilateral deltoids 5/5, bilateral biceps 5/5, bilateral triceps 5/5, bilateral wrist extension 5/5 bilateral hand  5/5  Bilateral hip flexion 5/5, bilateral knee extension 5/5, bilateral DF/PF 5/5  gait deferred  Able to detect light touch in all 4 extremities  right orbital ecchymosis   Right groin site soft, flat, no hematoma, pedal pulses present    Assessment & Plan     ASSESSMENT:      SAH (subarachnoid hemorrhage)    Ruptured aneurysm of anterior communicating artery    Communicating hydrocephalus    58-year-old female with SAH secondary to ruptured ACOM aneurysm postprocedure day 2 for embolization and successful web placement    PLAN:     Continue to monitor in ICU  Neurochecks q2h  Nimodipine x 21 days  Keppra  SBP goal 100-180 mmHg  Daily TCD's-with moderate vasospasms  CT this morning stable, repeat in a.m.  High-volume LP to see if this will help with headache and lethargy  Okay for DVT prophylaxis after lumbar puncture    I discussed the patient's findings and my recommendations with Dr. Duncan, patient, family, nursing staff, and primary care team    I spent 35 minutes caring for Binta Morales on this date of service. This time includes time spent by me in the following activities: preparing for the visit, reviewing tests, obtaining and/or reviewing a separately obtained history, performing a medically appropriate examination and/or evaluation, counseling and educating the patient/family/caregiver, ordering medications, tests, or  "procedures, referring and communicating with other health care professionals, documenting information in the medical record, independently interpreting results and communicating that information with the patient/family/caregiver, and care coordination          LOS: 2 days       Alma Delia Gutierrez, APRN  1/18/2024  10:55 EST    \"Dictated utilizing Dragon dictation\".      "

## 2024-01-18 NOTE — PLAN OF CARE
Goal Outcome Evaluation:  Plan of Care Reviewed With: patient, family        Progress: no change  Outcome Evaluation: Pt is a 59 yo female admitted for SAH secondary to ruptured ACOM aneurysm s/p embolization and successful web placement. Pt seen this date for OT eval in ICU, she reports (I) at baseline with no AD, lives with spouse. Today, pt presents minimally below her baseline with impaired balance, activity tolerance and pain reported in head. RN aware. She donned socks with s/up and SBA in bed, CGA/min A for STS and functional mobility with HHA to chair, she is overall limited by headache this date. She will continue to benefit from skilled OT to address noted functional deficits to ensure all needs met for d/c. Plans home vs rehab pending progress.      Anticipated Discharge Disposition (OT): home, home with assist, skilled nursing facility (pending)

## 2024-01-18 NOTE — PLAN OF CARE
Goal Outcome Evaluation:      Patient is a 58 y.o. female admitted to [facility] for SAH secondary to ruptured ACOM aneurysm s/p embolization and successful web placement on 1/16/2024- seen today in ICU. Patient is independent at baseline and lives at home with spouse. No prior use of AD. Today, patient performed bed mobility with CGA, required Ayo for transfers, and ambulated Ayo close to bed and to her recliner. Strength and endurance deficits noted. HA reported throughout- noted planned LP this afternoon per RN. Patient may benefit from skilled PT services acutely to address functional deficits as well as improve level of independence prior to discharge. Anticipate and hopeful for patient to return home upon DC. Will follow.    Anticipated Discharge Disposition (PT): home with home health, skilled nursing facility (pending patient progress made)

## 2024-01-18 NOTE — THERAPY EVALUATION
Patient Name: Binta Morales  : 1965    MRN: [id number]                              Today's Date: 2024       Admit Date: 2024    Visit Dx:     ICD-10-CM ICD-9-CM   1. SAH (subarachnoid hemorrhage)  I60.9 430     Patient Active Problem List   Diagnosis    SAH (subarachnoid hemorrhage)    Ruptured aneurysm of anterior communicating artery    Communicating hydrocephalus     Past Medical History:   Diagnosis Date    Hypertension      Past Surgical History:   Procedure Laterality Date    EMBOLIZATION CEREBRAL N/A 2024    Procedure: CEREBRAL ANGIOGRAM WITH EMBOLIZATION;  Surgeon: Angelito Gamble MD;  Location: [facility], [place] ;  Service: Interventional Radiology;  Laterality: N/A;      General Information       Row Name 24 1146          Physical Therapy Time and Intention    Document Type evaluation  -MS     Mode of Treatment physical therapy  -MS       Row Name 24 1146          General Information    Patient Profile Reviewed yes  -MS     Prior Level of Function independent:;all household mobility;bed mobility;transfer;gait;driving;ADL's  -MS     Existing Precautions/Restrictions fall  light sensitivity  -MS       Row Name 24 1146          Living Environment    People in Home child(bentley), adult  -MS       Row Name 24 1146          Stairs Within Home, Primary    Stairs Comment, Within Home, Primary bedroom is on main level.  -MS       Row Name 24 1146          Cognition    Orientation Status (Cognition) oriented x 4  -MS       Row Name 24 1146          Safety Issues, Functional Mobility    Safety Issues Affecting Function (Mobility) insight into deficits/self-awareness;sequencing abilities;awareness of need for assistance  -MS     Impairments Affecting Function (Mobility) endurance/activity tolerance;strength;pain;balance  -MS     Comment, Safety Issues/Impairments (Mobility) Gait belt and non skid socks donned.  -MS               User Key  (r) = Recorded By,  (t) = Taken By, (c) = Cosigned By      Initials Name Provider Type    MS KauffmanJuana, PT Physical Therapist                   Mobility       Row Name 01/18/24 1147          Bed Mobility    Bed Mobility supine-sit  -MS     Supine-Sit [place] (Bed Mobility) contact guard;verbal cues;nonverbal cues (demo/gesture)  -MS       Row Name 01/18/24 1147          Transfers    Comment, (Transfers) Mildly impulsive.  -MS       Row Name 01/18/24 1147          Sit-Stand Transfer    Sit-Stand [place] (Transfers) minimum assist (75% patient effort);verbal cues;nonverbal cues (demo/gesture)  -MS     Assistive Device (Sit-Stand Transfers) --  HHAx1  -MS       Row Name 01/18/24 1147          Gait/Stairs (Locomotion)    [place] Level (Gait) minimum assist (75% patient effort);verbal cues;nonverbal cues (demo/gesture)  -MS     Assistive Device (Gait) --  HHAx1  -MS     Distance in Feet (Gait) 8' total  -MS     Deviations/Abnormal Patterns (Gait) clarissa decreased;gait speed decreased  -MS     Bilateral Gait Deviations forward flexed posture  -MS     Comment, (Gait/Stairs) No overt LOB. HA limiting. Minimally unsteady. Agreable to sitting up in recliner.  -MS               User Key  (r) = Recorded By, (t) = Taken By, (c) = Cosigned By      Initials Name Provider Type    Juana Reinoso ASAD, PT Physical Therapist                   Obj/Interventions       Row Name 01/18/24 1148          Range of Motion Comprehensive    General Range of Motion no range of motion deficits identified  -MS     Comment, General Range of Motion B LEs grossly WFL  -MS       Row Name 01/18/24 1148          Strength Comprehensive (MMT)    General Manual Muscle Testing (MMT) Assessment lower extremity strength deficits identified  -MS     Comment, General Manual Muscle Testing (MMT) Assessment B LEs grossly 4+/5  -MS       Row Name 01/18/24 1148          Balance    Balance Assessment sitting static balance;standing static balance  -MS      Static Sitting Balance standby assist  -MS     Position, Sitting Balance sitting edge of bed  -MS     Static Standing Balance contact guard;minimal assist  -MS     Position/Device Used, Standing Balance supported  -MS       Row Name 01/18/24 1148          Sensory Assessment (Somatosensory)    Sensory Assessment (Somatosensory) sensation intact  -MS               User Key  (r) = Recorded By, (t) = Taken By, (c) = Cosigned By      Initials Name Provider Type    MS KauffmanJuana, PT Physical Therapist                   Goals/Plan       Row Name 01/18/24 1150          Bed Mobility Goal 1 (PT)    Activity/Assistive Device (Bed Mobility Goal 1, PT) bed mobility activities, all  -MS     [place] Level/Cues Needed (Bed Mobility Goal 1, PT) supervision required  -MS     Time Frame (Bed Mobility Goal 1, PT) 1 week  -MS       Row Name 01/18/24 1150          Transfer Goal 1 (PT)    Activity/Assistive Device (Transfer Goal 1, PT) transfers, all  -MS     [place] Level/Cues Needed (Transfer Goal 1, PT) supervision required  -MS     Time Frame (Transfer Goal 1, PT) 1 week  -MS       Row Name 01/18/24 1150          Gait Training Goal 1 (PT)    Activity/Assistive Device (Gait Training Goal 1, PT) gait (walking locomotion)  -MS     [place] Level (Gait Training Goal 1, PT) supervision required  -MS     Distance (Gait Training Goal 1, PT) 150  -MS     Time Frame (Gait Training Goal 1, PT) 1 week  -MS       Row Name 01/18/24 1150          Therapy Assessment/Plan (PT)    Planned Therapy Interventions (PT) balance training;bed mobility training;gait training;home exercise program;postural re-education;patient/family education;ROM (range of motion);stair training;strengthening;transfer training  -MS               User Key  (r) = Recorded By, (t) = Taken By, (c) = Cosigned By      Initials Name Provider Type    MS KauffmanJuana, PT Physical Therapist                   Clinical Impression       Row Name 01/18/24 1147           Pain    Pretreatment Pain Rating 6/10  -MS     Posttreatment Pain Rating 6/10  -MS     Pain Location - head  -MS     Pain Intervention(s) Medication (See MAR);Nursing Notified;Repositioned;Ambulation/increased activity;Rest  -MS       Row Name 01/18/24 1149          Therapy Assessment/Plan (PT)    Rehab Potential (PT) good, to achieve stated therapy goals  -MS     Criteria for Skilled Interventions Met (PT) yes;meets criteria  -MS     Therapy Frequency (PT) 6 times/wk  -MS       Row Name 01/18/24 1149          Vital Signs    O2 Delivery Pre Treatment room air  -MS       Row Name 01/18/24 1149          Positioning and Restraints    Pre-Treatment Position in bed  -MS     Post Treatment Position chair  -MS     In Chair notified nsg;encouraged to call for assist;reclined;with family/caregiver;call light within reach  -MS               User Key  (r) = Recorded By, (t) = Taken By, (c) = Cosigned By      Initials Name Provider Type    Juana Reinoso PT Physical Therapist                   Outcome Measures       Row Name 01/18/24 1150          How much help from another person do you currently need...    Turning from your back to your side while in flat bed without using bedrails? 4  -MS     Moving from lying on back to sitting on the side of a flat bed without bedrails? 4  -MS     Moving to and from a bed to a chair (including a wheelchair)? 3  -MS     Standing up from a chair using your arms (e.g., wheelchair, bedside chair)? 3  -MS     Climbing 3-5 steps with a railing? 2  -MS     To walk in hospital room? 2  -MS     AM-PAC 6 Clicks Score (PT) 18  -MS     Highest Level of Mobility Goal 6 --> Walk 10 steps or more  -MS               User Key  (r) = Recorded By, (t) = Taken By, (c) = Cosigned By      Initials Name Provider Type    Juana Reinoso, PT Physical Therapist                                 Physical Therapy Education       Title: PT OT SLP Therapies (Done)       Topic: Physical Therapy  (Done)       Point: Mobility training (Done)       Learning Progress Summary             Patient Acceptance, E,TB, VU by MS at 1/18/2024 1150                         Point: Home exercise program (Done)       Learning Progress Summary             Patient Acceptance, E,TB, VU by MS at 1/18/2024 1150                         Point: Body mechanics (Done)       Learning Progress Summary             Patient Acceptance, E,TB, VU by MS at 1/18/2024 1150                         Point: Precautions (Done)       Learning Progress Summary             Patient Acceptance, E,TB, VU by MS at 1/18/2024 1150                                         User Key       Initials Effective Dates Name Provider Type Discipline    MS 06/16/21 -  Juana Kauffman PT Physical Therapist PT                  PT Recommendation and Plan  Planned Therapy Interventions (PT): balance training, bed mobility training, gait training, home exercise program, postural re-education, patient/family education, ROM (range of motion), stair training, strengthening, transfer training        Time Calculation:         PT Charges       Row Name 01/18/24 1146             Time Calculation    Start Time 1020  -MS      Stop Time 1032  -MS      Time Calculation (min) 12 min  -MS      PT Received On 01/18/24  -MS      PT - Next Appointment 01/19/24  -MS      PT Goal Re-Cert Due Date 01/25/24  -MS                User Key  (r) = Recorded By, (t) = Taken By, (c) = Cosigned By      Initials Name Provider Type    MS KauffmanJuana, PT Physical Therapist                  Therapy Charges for Today       Code Description Service Date Service Provider Modifiers Qty    56382774613 HC PT EVAL MOD COMPLEXITY 3 1/18/2024 Juana Kauffman, PT GP 1    21172109486 HC PT THER PROC EA 15 MIN 1/18/2024 Juana Kauffman, PT GP 1            PT G-Codes  AM-PAC 6 Clicks Score (PT): 18  PT Discharge Summary  Anticipated Discharge Disposition (PT): home with home health, skilled nursing  facility (pending patient progress made)    Juana Kauffman, PT  1/18/2024

## 2024-01-18 NOTE — CASE MANAGEMENT/SOCIAL WORK
Discharge Planning Assessment  [facility]     Patient Name: Binta Morales  MRN: [id number]  Today's Date: 1/18/2024    Admit Date: 1/16/2024    Plan: Home, family to transport   Discharge Needs Assessment       Row Name 01/18/24 1428       Living Environment    People in Home spouse    Current Living Arrangements home    Primary Care Provided by self    Provides Primary Care For no one    Family Caregiver if Needed spouse    Able to Return to Prior Arrangements yes       Resource/Environmental Concerns    Resource/Environmental Concerns none       Transition Planning    Patient/Family Anticipates Transition to home with family    Patient/Family Anticipated Services at Transition none    Transportation Anticipated family or friend will provide       Discharge Needs Assessment    Equipment Currently Used at Home none    Concerns to be Addressed denies needs/concerns at this time    Equipment Needed After Discharge none                   Discharge Plan       Row Name 01/18/24 1429       Plan    Plan Home, family to transport    Patient/Family in Agreement with Plan yes    Plan Comments CCP spoke with patient and spouse at bedside; explained role, verified facesheet, and discussed dc plan. Patient uses no DME and is independent for mobility at bedside. She lives with her spouse in a 2 level home with 1 step to enter. She reports no trouble with ambulation throughout her home. She has no history of HH or SNF. Patient reports the plan is home and denies any HH or DME needs. Family to transport. ELBA, ALEKSANDRW                  Continued Care and Services - Admitted Since 1/16/2024    Coordination has not been started for this encounter.          Demographic Summary       Row Name 01/18/24 1428       General Information    Admission Type inpatient    Arrived From home    Referral Source admission list    Reason for Consult discharge planning    Preferred Language English       Contact Information    Permission Granted to Share  Info With family/designee                   Functional Status       Row Name 01/18/24 1428       Functional Status    Usual Activity Tolerance good    Current Activity Tolerance good       Functional Status, IADL    Medications independent    Meal Preparation independent    Housekeeping independent    Laundry independent    Shopping independent       Mental Status    General Appearance WDL WDL                   Psychosocial    No documentation.                  Abuse/Neglect    No documentation.                  Legal    No documentation.                  Substance Abuse    No documentation.                  Patient Forms    No documentation.                     JENNIFFER Lazo

## 2024-01-18 NOTE — PROGRESS NOTES
Pulm/CC note    New fever last .6  Per chart review there has been some concern for aspiration that may have occurred pre-hospital, repeat CXR ordered.  There was only a small area of localized atelectasis at the left apex when compared to prior.  Speech therapy passed patient for reg diet, thin liquids, per RN report she has had no coughing or issues with swallowing. She remains on 2L NC  Blood, urine, sputum cultures ordered  Lactate neg, procal neg    UA resulted, possible UTI.  Note indwelling fair catheter was d/c'd yesterday.    Pt started on Rocephin.

## 2024-01-18 NOTE — PLAN OF CARE
Goal Outcome Evaluation:         Pt assessment per flowsheet. Medications per MAR. No acute events through shift. Neuro status remains intact. AM CT head stable. C. Bills informed of fever at 2040, see orders. Pt NIH 0. Pt chest rising and falling, report to GENE Alvarez RN.

## 2024-01-18 NOTE — PROGRESS NOTES
"  PROGRESS NOTE  Patient Name: Binta Morales  Age/Sex: 58 y.o. female  : 1965  MRN: [id number]    Date of Admission: 2024  Date of Encounter Visit: 24   LOS: 2 days   Patient Care Team:  Alma Delia Rudolph PA-C as PCP - General (Physician Assistant)    Chief Complaint: Subarachnoid hemorrhage due to ACOM aneurysm    Hospital course: Patient had some lethargy earlier today however that seems to be her normal morning routines, she was doing much better on later assessment and this is not unusual according to her  at baseline.  She still complaining of Headache and she was seen by neurosurgery with the plan to consider lumbar puncture to help with that  She is afebrile  She did have some hypoxemia yesterday, and then she developed some fever with some leukocytosis, the chest x-ray showed some questionable infiltrate but I am not that impressed with but the patient was started on Rocephin.        REVIEW OF SYSTEMS:   CONSTITUTIONAL: no fever or chills  CARDIOVASCULAR: No chest pain, chest pressure or chest discomfort. No palpitations or edema.   RESPIRATORY: No shortness of breath, cough or sputum.   GASTROINTESTINAL: No anorexia, nausea, vomiting or diarrhea. No abdominal pain or blood.   HEMATOLOGIC: No bleeding or bruising.  Except for the right periorbital ecchymosis  Positive headache    Ventilator/Non-Invasive Ventilation Settings (From admission, onward)      None              Vital Signs  Temp:  [98.7 °F (37.1 °C)-101.6 °F (38.7 °C)] 100.1 °F (37.8 °C)  Heart Rate:  [69-97] 80  BP: ()/(51-81) 146/80  SpO2:  [88 %-98 %] 92 %  on  Flow (L/min):  [2] 2 Device (Oxygen Therapy): room air  No intake or output data in the 24 hours ending 24 1300    Flowsheet Rows      Flowsheet Row First Filed Value   Admission Height 165.1 cm (65\") Documented at 2024 1009   Admission Weight 68.9 kg (151 lb 14.4 oz) Documented at 2024 0545          Body mass index is 24.1 kg/m².      " 01/17/24  0545 01/18/24  0500   Weight: 68.9 kg (151 lb 14.4 oz) 65.7 kg (144 lb 13.5 oz)       Physical Exam:  GEN:  No acute distress, alert, cooperative, well developed   EYES:   Sclerae clear. No icterus. PERRL. Normal EOM.  She has raccoon eye on the right side from her previous trauma  ENT:   External ears/nose normal, no oral lesions, no thrush, mucous membranes moist  NECK:  Supple, midline trachea, no JVD  LUNGS: Normal chest on inspection, clear to auscultation no wheezes or crackles or rhonchi with good breath sounds bilaterally. Respirations regular, even and unlabored.   CV:  Regular rhythm and rate. Normal S1/S2. No murmurs, gallops, or rubs noted.  ABD:  Soft, nontender and nondistended. Normal bowel sounds. No guarding  EXT:  Moves all extremities well. No cyanosis. No redness. No edema.   Skin: Dry, intact, no bleeding    Results Review:    Results From Last 14 Days   Lab Units 01/18/24 0527 01/17/24  2124   LACTATE mmol/L  --  1.0   TRIGLYCERIDES mg/dL 120  --      Results from last 7 days   Lab Units 01/18/24 0527 01/17/24  0611 01/16/24  1138   SODIUM mmol/L 140 136 138   POTASSIUM mmol/L 4.2 2.7* 3.4*   CHLORIDE mmol/L 106 102 98   CO2 mmol/L 22.0 20.0* 22.0   BUN mg/dL 15 13 20   CREATININE mg/dL 0.76 0.63 0.87   CALCIUM mg/dL 9.9 8.8 10.3   AST (SGOT) U/L  --   --  17   ALT (SGPT) U/L  --   --  39*   ANION GAP mmol/L 12.0 14.0 18.0*   ALBUMIN g/dL  --   --  4.3     Results from last 7 days   Lab Units 01/16/24  1138   HSTROP T ng/L 34*             Results from last 7 days   Lab Units 01/18/24 0527 01/17/24  0611 01/16/24  1138   WBC 10*3/mm3 14.91* 11.33* 12.30*   HEMOGLOBIN g/dL 13.7 12.9 15.7   HEMATOCRIT % 40.1 38.0 47.2*   PLATELETS 10*3/mm3 317 164 373   MCV fL 85.3 87.4 83.8   NEUTROPHIL % %  --   --  84.0*   LYMPHOCYTE % %  --   --  10.6*   MONOCYTES % %  --   --  4.8*   EOSINOPHIL % %  --   --  0.0*   BASOPHIL % %  --   --  0.2   IMM GRAN % %  --   --  0.4     Results from last 7  days   Lab Units 01/16/24  1253   INR  1.09   APTT seconds 29.8     Results from last 7 days   Lab Units 01/17/24  0611   MAGNESIUM mg/dL 2.4     Results from last 7 days   Lab Units 01/18/24  0527   CHOLESTEROL mg/dL 143   TRIGLYCERIDES mg/dL 120   HDL CHOL mg/dL 41         Results from last 7 days   Lab Units 01/18/24  0527   HEMOGLOBIN A1C % 5.80*     Glucose   Date/Time Value Ref Range Status   01/18/2024 0022 122 70 - 130 mg/dL Final   01/17/2024 1200 134 (H) 70 - 130 mg/dL Final   01/16/2024 2019 115 70 - 130 mg/dL Final     Results from last 7 days   Lab Units 01/17/24  2124   PROCALCITONIN ng/mL 0.22   LACTATE mmol/L 1.0     Results from last 7 days   Lab Units 01/17/24  2239   URINECX  >100,000 CFU/mL Gram Negative Bacilli*     Results from last 7 days   Lab Units 01/17/24  2239   NITRITE UA  Negative   WBC UA /HPF Too Numerous to Count*   BACTERIA UA /HPF 4+*   SQUAM EPITHEL UA /HPF 0-2   URINECX  >100,000 CFU/mL Gram Negative Bacilli*                   Imaging:   Imaging Results (All)        I reviewed the patient's new clinical results.  I personally viewed and interpreted the patient's imaging results:        Medication Review:   aspirin, 81 mg, Oral, Daily  atorvastatin, 20 mg, Oral, Nightly  cefTRIAXone, 1,000 mg, Intravenous, Q24H  levETIRAcetam, 500 mg, Intravenous, Q12H  niMODipine, 60 mg, Oral, Q4H  senna-docusate sodium, 2 tablet, Oral, BID  sodium chloride, 10 mL, Intravenous, Q12H        hold, 1 each  niCARdipine, 5-15 mg/hr, Last Rate: Stopped (01/17/24 1200)        ASSESSMENT:   ACOM aneurysm status post rupture and repair  Subarachnoid hemorrhage secondary to above  Hypoxemia mainly while asleep with sats of 88% on room air while asleep  Hypertension by history    PLAN:  Case was discussed with neurosurgery, they are planning on lumbar puncture and CSF drainage to help with the hydrocephalus and to help with the headache  Patient had questionable pneumonia, this may have happened when she  vomited when she was unconscious and she is now on Rocephin which will be continued  Chest x-ray reviewed showed no impressive finding except for some elevated right hemidiaphragm  Neurologically she is unchanged on my assessment however she had some lethargy earlier and she is still complaining of the headache  12.  Dose was almost held this morning because of the blood pressure, she was hypertensive and now she was borderline hypotensive.  We will leave clear instruction not to hold the medication and notify us so that we can provide further measures to allow the administration of the nimodipine  Continue the Keppra for the seizure prophylaxis.  She is back on aspirin as per neurosurgery.      Summary of recommendations:  Rocephin to be continued  LP for CSF drainage  Continue to monitor in the ICU  Continue with the monitoring for any cerebral vasospasm and administer the nimodipine as scheduled    Labs/Notes/films were independently reviewed and pertinent results are summarized above  The copied texts in this note were reviewed and they are accurate as of 01/18/24    Disposition: Continue to monitor in the ICU    Chris Levine MD  01/18/24  13:00 EST          Dictated utilizing Dragon dictation

## 2024-01-19 ENCOUNTER — APPOINTMENT (OUTPATIENT)
Dept: CT IMAGING | Facility: HOSPITAL | Age: 59
DRG: 020 | End: 2024-01-19
Payer: COMMERCIAL

## 2024-01-19 LAB
ANION GAP SERPL CALCULATED.3IONS-SCNC: 13.6 MMOL/L (ref 5–15)
BACTERIA SPEC AEROBE CULT: ABNORMAL
BUN SERPL-MCNC: 15 MG/DL (ref 6–20)
BUN/CREAT SERPL: 29.4 (ref 7–25)
CALCIUM SPEC-SCNC: 9.3 MG/DL (ref 8.6–10.5)
CHLORIDE SERPL-SCNC: 103 MMOL/L (ref 98–107)
CO2 SERPL-SCNC: 20.4 MMOL/L (ref 22–29)
CREAT SERPL-MCNC: 0.51 MG/DL (ref 0.57–1)
DEPRECATED RDW RBC AUTO: 37 FL (ref 37–54)
EGFRCR SERPLBLD CKD-EPI 2021: 107.7 ML/MIN/1.73
ERYTHROCYTE [DISTWIDTH] IN BLOOD BY AUTOMATED COUNT: 12.2 % (ref 12.3–15.4)
GLUCOSE SERPL-MCNC: 119 MG/DL (ref 65–99)
HCT VFR BLD AUTO: 37.9 % (ref 34–46.6)
HGB BLD-MCNC: 12.5 G/DL (ref 12–15.9)
MCH RBC QN AUTO: 27.5 PG (ref 26.6–33)
MCHC RBC AUTO-ENTMCNC: 33 G/DL (ref 31.5–35.7)
MCV RBC AUTO: 83.5 FL (ref 79–97)
PLATELET # BLD AUTO: 295 10*3/MM3 (ref 140–450)
PMV BLD AUTO: 9.1 FL (ref 6–12)
POTASSIUM SERPL-SCNC: 4 MMOL/L (ref 3.5–5.2)
RBC # BLD AUTO: 4.54 10*6/MM3 (ref 3.77–5.28)
SODIUM SERPL-SCNC: 137 MMOL/L (ref 136–145)
WBC NRBC COR # BLD AUTO: 11.98 10*3/MM3 (ref 3.4–10.8)

## 2024-01-19 PROCEDURE — 80048 BASIC METABOLIC PNL TOTAL CA: CPT | Performed by: INTERNAL MEDICINE

## 2024-01-19 PROCEDURE — 25010000002 LEVETRIRACETAM PER 10 MG: Performed by: INTERNAL MEDICINE

## 2024-01-19 PROCEDURE — 85027 COMPLETE CBC AUTOMATED: CPT | Performed by: INTERNAL MEDICINE

## 2024-01-19 PROCEDURE — 97530 THERAPEUTIC ACTIVITIES: CPT

## 2024-01-19 PROCEDURE — 25010000002 CEFTRIAXONE PER 250 MG: Performed by: INTERNAL MEDICINE

## 2024-01-19 PROCEDURE — 70450 CT HEAD/BRAIN W/O DYE: CPT

## 2024-01-19 RX ORDER — AMOXICILLIN AND CLAVULANATE POTASSIUM 875; 125 MG/1; MG/1
1 TABLET, FILM COATED ORAL EVERY 12 HOURS SCHEDULED
Status: DISCONTINUED | OUTPATIENT
Start: 2024-01-19 | End: 2024-01-21

## 2024-01-19 RX ADMIN — Medication 10 ML: at 20:09

## 2024-01-19 RX ADMIN — NIMODIPINE 60 MG: 30 CAPSULE, LIQUID FILLED ORAL at 23:55

## 2024-01-19 RX ADMIN — ATORVASTATIN CALCIUM 20 MG: 20 TABLET, FILM COATED ORAL at 20:10

## 2024-01-19 RX ADMIN — NIMODIPINE 60 MG: 30 CAPSULE, LIQUID FILLED ORAL at 01:01

## 2024-01-19 RX ADMIN — Medication 10 ML: at 08:18

## 2024-01-19 RX ADMIN — CEFTRIAXONE SODIUM 1000 MG: 1 INJECTION, POWDER, FOR SOLUTION INTRAMUSCULAR; INTRAVENOUS at 06:28

## 2024-01-19 RX ADMIN — NIMODIPINE 60 MG: 30 CAPSULE, LIQUID FILLED ORAL at 11:55

## 2024-01-19 RX ADMIN — ACETAMINOPHEN 650 MG: 325 TABLET, FILM COATED ORAL at 21:39

## 2024-01-19 RX ADMIN — LEVETIRACETAM 500 MG: 500 INJECTION, SOLUTION INTRAVENOUS at 08:18

## 2024-01-19 RX ADMIN — NIMODIPINE 60 MG: 30 CAPSULE, LIQUID FILLED ORAL at 04:16

## 2024-01-19 RX ADMIN — NIMODIPINE 60 MG: 30 CAPSULE, LIQUID FILLED ORAL at 20:08

## 2024-01-19 RX ADMIN — ASPIRIN 81 MG: 81 TABLET, COATED ORAL at 08:18

## 2024-01-19 RX ADMIN — AMOXICILLIN AND CLAVULANATE POTASSIUM 1 TABLET: 875; 125 TABLET, FILM COATED ORAL at 20:10

## 2024-01-19 RX ADMIN — NIMODIPINE 60 MG: 30 CAPSULE, LIQUID FILLED ORAL at 08:18

## 2024-01-19 RX ADMIN — NIMODIPINE 60 MG: 30 CAPSULE, LIQUID FILLED ORAL at 16:10

## 2024-01-19 RX ADMIN — LEVETIRACETAM 500 MG: 500 INJECTION, SOLUTION INTRAVENOUS at 20:16

## 2024-01-19 NOTE — THERAPY TREATMENT NOTE
Patient Name: Binta Morales  : 1965    MRN: [id number]                              Today's Date: 2024       Admit Date: 2024    Visit Dx:     ICD-10-CM ICD-9-CM   1. SAH (subarachnoid hemorrhage)  I60.9 430     Patient Active Problem List   Diagnosis    SAH (subarachnoid hemorrhage)    Ruptured aneurysm of anterior communicating artery    Communicating hydrocephalus     Past Medical History:   Diagnosis Date    Hypertension      Past Surgical History:   Procedure Laterality Date    EMBOLIZATION CEREBRAL N/A 2024    Procedure: CEREBRAL ANGIOGRAM WITH EMBOLIZATION;  Surgeon: Angelito Gamble MD;  Location: [facility] ;  Service: Interventional Radiology;  Laterality: N/A;      General Information       Row Name 24 1120          Physical Therapy Time and Intention    Document Type therapy note (daily note)  -ER     Mode of Treatment physical therapy;individual therapy  -ER       Row Name 24 1120          General Information    Patient Profile Reviewed yes  -ER     Existing Precautions/Restrictions fall  -ER       Row Name 24 1120          Cognition    Orientation Status (Cognition) oriented x 4  -ER       Row Name 24 1120          Safety Issues, Functional Mobility    Impairments Affecting Function (Mobility) endurance/activity tolerance;strength;pain;balance  -ER     Comment, Safety Issues/Impairments (Mobility) gait belt and non skid socks donned  -ER               User Key  (r) = Recorded By, (t) = Taken By, (c) = Cosigned By      Initials Name Provider Type    ER Bela Forte PT Physical Therapist                   Mobility       Row Name 24 1121          Bed Mobility    Bed Mobility supine-sit  -ER     Supine-Sit Martha (Bed Mobility) minimum assist (75% patient effort)  -ER     Comment, (Bed Mobility) UIC at end of session  -ER       Row Name 24 1121          Transfers    Comment, (Transfers) impulsive  -ER       Row Name 24 1121           Sit-Stand Transfer    Sit-Stand [place] (Transfers) standby assist  -ER     Assistive Device (Sit-Stand Transfers) other (see comments)  no AD  -ER       Row Name 01/19/24 1121          Gait/Stairs (Locomotion)    [place] Level (Gait) contact guard;minimum assist (75% patient effort)  -ER     Assistive Device (Gait) other (see comments)  HHA  -ER     Patient was able to Ambulate yes  -ER     Distance in Feet (Gait) 8'  -ER     Deviations/Abnormal Patterns (Gait) clarissa decreased;gait speed decreased  -ER     Bilateral Gait Deviations forward flexed posture  -ER     Comment, (Gait/Stairs) Variable step length, verbal cueing for positioning prior to sitting in bedside chair. Impulsive w/ anterior lean - no overt LOB  -ER               User Key  (r) = Recorded By, (t) = Taken By, (c) = Cosigned By      Initials Name Provider Type    ER Bela Forte, ANSLEY Physical Therapist                   Obj/Interventions       Row Name 01/19/24 1122          Balance    Balance Assessment sitting static balance;standing static balance;standing dynamic balance  -ER     Static Sitting Balance supervision  -ER     Position, Sitting Balance unsupported;sitting edge of bed  -ER     Static Standing Balance contact guard;minimal assist  -ER     Dynamic Standing Balance minimal assist  -ER     Position/Device Used, Standing Balance other (see comments)  HHA  -ER     Balance Interventions sitting;standing;sit to stand;supported;static;dynamic  -ER     Comment, Balance Mildly unsteady  -ER               User Key  (r) = Recorded By, (t) = Taken By, (c) = Cosigned By      Initials Name Provider Type    ER Bela Forte, PT Physical Therapist                   Goals/Plan    No documentation.                  Clinical Impression       Row Name 01/19/24 1123          Pain    Pretreatment Pain Rating 3/10  -ER     Posttreatment Pain Rating 3/10  -ER     Pain Location - head  -ER     Pain Intervention(s)  Rest;Repositioned;Ambulation/increased activity;Nursing Notified;Medication (See MAR)  -ER       Row Name 01/19/24 1123          Plan of Care Review    Plan of Care Reviewed With patient;family  -ER     Outcome Evaluation Binta Morales is a 58 year old female seen for physical therapy treatment this AM. Pt. was admitted for SAH secondary to reptured ACOM aneurysm s/p embolization w/ successful web placement. Today, pt. performs supine>sit with Min A. She performs STS with standby A however is very impulsive, requiring intermittent CGA-Min A for safety. Pt. requiring CGA-Min A for safety with ambulating ~8 ft to bedside chair with HHA. No LOB however variable step length and some anterior lean. Pt. performs x10 LAQ, ankle pumps and marches while seated in chair. Pt. left with all needs met and within reach. PT recommending home with HH vs SNF pending progress.  -ER       Row Name 01/19/24 1123          Therapy Assessment/Plan (PT)    Rehab Potential (PT) good, to achieve stated therapy goals  -ER     Criteria for Skilled Interventions Met (PT) yes;meets criteria  -ER     Therapy Frequency (PT) 6 times/wk  -ER       Row Name 01/19/24 1123          Positioning and Restraints    Pre-Treatment Position in bed  -ER     Post Treatment Position chair  -ER     In Chair notified nsg;call light within reach;encouraged to call for assist;exit alarm on  -ER               User Key  (r) = Recorded By, (t) = Taken By, (c) = Cosigned By      Initials Name Provider Type    ER Bela Forte, PT Physical Therapist                   Outcome Measures       Row Name 01/19/24 1128 01/19/24 0800       How much help from another person do you currently need...    Turning from your back to your side while in flat bed without using bedrails? 3  -ER 4  -HD    Moving from lying on back to sitting on the side of a flat bed without bedrails? 3  -ER 4  -HD    Moving to and from a bed to a chair (including a wheelchair)? 3  -ER 3  -HD    Standing up  from a chair using your arms (e.g., wheelchair, bedside chair)? 3  -ER 3  -HD    Climbing 3-5 steps with a railing? 2  -ER 4  -HD    To walk in hospital room? 3  -ER 3  -HD    AM-PAC 6 Clicks Score (PT) 17  -ER 21  -HD    Highest Level of Mobility Goal 5 --> Static standing  -ER 6 --> Walk 10 steps or more  -HD      Row Name 01/19/24 1128          Functional Assessment    Outcome Measure Options AM-PAC 6 Clicks Basic Mobility (PT)  -ER               User Key  (r) = Recorded By, (t) = Taken By, (c) = Cosigned By      Initials Name Provider Type     Pita Scott, RN Registered Nurse    ER Bela Forte, PT Physical Therapist                                 Physical Therapy Education       Title: PT OT SLP Therapies (Done)       Topic: Physical Therapy (Done)       Point: Mobility training (Done)       Learning Progress Summary             Patient Acceptance, E, VU by ER at 1/19/2024 1128    Acceptance, E,TB, VU by MS at 1/18/2024 1150                         Point: Home exercise program (Done)       Learning Progress Summary             Patient Acceptance, E, VU by ER at 1/19/2024 1128    Acceptance, E,TB, VU by MS at 1/18/2024 1150                         Point: Body mechanics (Done)       Learning Progress Summary             Patient Acceptance, E, VU by ER at 1/19/2024 1128    Acceptance, E,TB, VU by MS at 1/18/2024 1150                         Point: Precautions (Done)       Learning Progress Summary             Patient Acceptance, E, VU by ER at 1/19/2024 1128    Acceptance, E,TB, VU by MS at 1/18/2024 1150                                         User Key       Initials Effective Dates Name Provider Type Discipline    MS 06/16/21 -  Juana Kauffman, PT Physical Therapist PT    ER 10/15/23 -  Bela Forte, PT Physical Therapist PT                  PT Recommendation and Plan     Plan of Care Reviewed With: patient, family  Outcome Evaluation: Binta Morales is a 58 year old female seen for physical therapy  treatment this AM. Pt. was admitted for SAH secondary to reptured ACOM aneurysm s/p embolization w/ successful web placement. Today, pt. performs supine>sit with Min A. She performs STS with standby A however is very impulsive, requiring intermittent CGA-Min A for safety. Pt. requiring CGA-Min A for safety with ambulating ~8 ft to bedside chair with HHA. No LOB however variable step length and some anterior lean. Pt. performs x10 LAQ, ankle pumps and marches while seated in chair. Pt. left with all needs met and within reach. PT recommending home with HH vs SNF pending progress.     Time Calculation:         PT Charges       Row Name 01/19/24 1129             Time Calculation    Start Time 0903  -ER      Stop Time 0918  -ER      Time Calculation (min) 15 min  -ER      PT Received On 01/19/24  -ER      PT - Next Appointment 01/20/24  -ER         Time Calculation- PT    Total Timed Code Minutes- PT 15 minute(s)  -ER         Timed Charges    00258 - PT Therapeutic Activity Minutes 15  -ER         Total Minutes    Timed Charges Total Minutes 15  -ER       Total Minutes 15  -ER                User Key  (r) = Recorded By, (t) = Taken By, (c) = Cosigned By      Initials Name Provider Type    ER Bela Forte, PT Physical Therapist                  Therapy Charges for Today       Code Description Service Date Service Provider Modifiers Qty    [id number]  PT THERAPEUTIC ACT EA 15 MIN 1/19/2024 Bela Forte, PT GP 1            PT G-Codes  Outcome Measure Options: AM-PAC 6 Clicks Basic Mobility (PT)  AM-PAC 6 Clicks Score (PT): 17  AM-PAC 6 Clicks Score (OT): 21  Modified Matt Scale: 2 - Slight disability.  Unable to carry out all previous activities but able to look after own affairs without assistance.  PT Discharge Summary  Anticipated Discharge Disposition (PT): home with home health, skilled nursing facility    Bela Forte PT  1/19/2024

## 2024-01-19 NOTE — PROGRESS NOTES
"  PROGRESS NOTE  Patient Name: Binta Morales  Age/Sex: 58 y.o. female  : 1965  MRN: [id number]    Date of Admission: 2024  Date of Encounter Visit: 24   LOS: 3 days   Patient Care Team:  Alma Delia Rudolph PA-C as PCP - General (Physician Assistant)    Chief Complaint: Subarachnoid hemorrhage due to ACOM aneurysm    Hospital course: Patient is doing better, she does have some cerebral vasospasm and she is on the calcium channel oral blockers.  She does have episode of lethargy when she first woke up from sleep but that is her baseline and according to the family this is normal for her but when she is awake she is fully alert, she was walking in the ICU with the therapist with no problem with the balance  or any focal weakness.  She had an LP with CSF drainage yesterday that did help a lot with a headache          REVIEW OF SYSTEMS:   CONSTITUTIONAL: no fever or chills  CARDIOVASCULAR: No chest pain, chest pressure or chest discomfort. No palpitations or edema.   RESPIRATORY: No shortness of breath, cough or sputum.   GASTROINTESTINAL: No anorexia, nausea, vomiting or diarrhea. No abdominal pain or blood.   HEMATOLOGIC: No bleeding or bruising.  Headache is better    Ventilator/Non-Invasive Ventilation Settings (From admission, onward)      None              Vital Signs  Temp:  [99.1 °F (37.3 °C)-102.6 °F (39.2 °C)] 99.8 °F (37.7 °C)  Heart Rate:  [] 74  BP: (107-163)/(54-84) 142/74  SpO2:  [88 %-99 %] 95 %  on  Flow (L/min):  [2] 2 Device (Oxygen Therapy): room air  No intake or output data in the 24 hours ending 24 1444    Flowsheet Rows      Flowsheet Row First Filed Value   Admission Height 165.1 cm (65\") Documented at 2024 1009   Admission Weight 68.9 kg (151 lb 14.4 oz) Documented at 2024 0545          Body mass index is 24.1 kg/m².      24  0545 24  0500   Weight: 68.9 kg (151 lb 14.4 oz) 65.7 kg (144 lb 13.5 oz)       Physical Exam:  GEN:  No acute " distress, alert, cooperative, well developed   EYES:   Sclerae clear. No icterus. PERRL. Normal EOM.  She has raccoon eye on the right side from her previous trauma  ENT:   External ears/nose normal, no oral lesions, no thrush, mucous membranes moist  NECK:  Supple, midline trachea, no JVD  LUNGS: Normal chest on inspection, clear to auscultation no wheezes or crackles or rhonchi with good breath sounds bilaterally. Respirations regular, even and unlabored.   CV:  Regular rhythm and rate. Normal S1/S2. No murmurs, gallops, or rubs noted.  ABD:  Soft, nontender and nondistended. Normal bowel sounds. No guarding  EXT:  Moves all extremities well. No cyanosis. No redness. No edema.   Skin: Dry, intact, no bleeding    Results Review:    Results From Last 14 Days   Lab Units 01/18/24 0527 01/17/24  2124   LACTATE mmol/L  --  1.0   TRIGLYCERIDES mg/dL 120  --      Results from last 7 days   Lab Units 01/19/24 0417 01/18/24 0527 01/17/24  0611 01/16/24  1138   SODIUM mmol/L 137 140 136 138   POTASSIUM mmol/L 4.0 4.2 2.7* 3.4*   CHLORIDE mmol/L 103 106 102 98   CO2 mmol/L 20.4* 22.0 20.0* 22.0   BUN mg/dL 15 15 13 20   CREATININE mg/dL 0.51* 0.76 0.63 0.87   CALCIUM mg/dL 9.3 9.9 8.8 10.3   AST (SGOT) U/L  --   --   --  17   ALT (SGPT) U/L  --   --   --  39*   ANION GAP mmol/L 13.6 12.0 14.0 18.0*   ALBUMIN g/dL  --   --   --  4.3     Results from last 7 days   Lab Units 01/16/24  1138   HSTROP T ng/L 34*             Results from last 7 days   Lab Units 01/19/24 0417 01/18/24 0527 01/17/24  0611 01/16/24  1138   WBC 10*3/mm3 11.98* 14.91* 11.33* 12.30*   HEMOGLOBIN g/dL 12.5 13.7 12.9 15.7   HEMATOCRIT % 37.9 40.1 38.0 47.2*   PLATELETS 10*3/mm3 295 317 164 373   MCV fL 83.5 85.3 87.4 83.8   NEUTROPHIL % %  --   --   --  84.0*   LYMPHOCYTE % %  --   --   --  10.6*   MONOCYTES % %  --   --   --  4.8*   EOSINOPHIL % %  --   --   --  0.0*   BASOPHIL % %  --   --   --  0.2   IMM GRAN % %  --   --   --  0.4     Results from  last 7 days   Lab Units 01/16/24  1253   INR  1.09   APTT seconds 29.8     Results from last 7 days   Lab Units 01/17/24  0611   MAGNESIUM mg/dL 2.4     Results from last 7 days   Lab Units 01/18/24  0527   CHOLESTEROL mg/dL 143   TRIGLYCERIDES mg/dL 120   HDL CHOL mg/dL 41         Results from last 7 days   Lab Units 01/18/24  0527   HEMOGLOBIN A1C % 5.80*     Glucose   Date/Time Value Ref Range Status   01/18/2024 0022 122 70 - 130 mg/dL Final   01/17/2024 1200 134 (H) 70 - 130 mg/dL Final   01/16/2024 2019 115 70 - 130 mg/dL Final     Results from last 7 days   Lab Units 01/17/24  2124   PROCALCITONIN ng/mL 0.22   LACTATE mmol/L 1.0     Results from last 7 days   Lab Units 01/17/24  2239 01/17/24 2154 01/17/24 2124   BLOODCX   --  No growth at 24 hours No growth at 24 hours   URINECX  >100,000 CFU/mL Escherichia coli*  --   --      Results from last 7 days   Lab Units 01/17/24 2239   NITRITE UA  Negative   WBC UA /HPF Too Numerous to Count*   BACTERIA UA /HPF 4+*   SQUAM EPITHEL UA /HPF 0-2   URINECX  >100,000 CFU/mL Escherichia coli*                   Imaging:   Imaging Results (All)        I reviewed the patient's new clinical results.  I personally viewed and interpreted the patient's imaging results:        Medication Review:   aspirin, 81 mg, Oral, Daily  atorvastatin, 20 mg, Oral, Nightly  [START ON 1/20/2024] cefTRIAXone, 2,000 mg, Intravenous, Q24H  levETIRAcetam, 500 mg, Intravenous, Q12H  niMODipine, 60 mg, Oral, Q4H  senna-docusate sodium, 2 tablet, Oral, BID  sodium chloride, 10 mL, Intravenous, Q12H        hold, 1 each        ASSESSMENT:   ACOM aneurysm status post rupture and repair  Subarachnoid hemorrhage secondary to above  Hypoxemia mainly while asleep with sats of 88% on room air while asleep  Hypertension by history    PLAN:  Patient is doing better, she is on the calcium channel blockers and she is followed by neurosurgery with daily Dopplers to assess the level of the vasospasm.  She does  not need any intervention at this point except for the oral medication and the follow-up  Headache is better after LP and will defer to neurosurgery if more drainage is needed  She is on the ceftriaxone, we will transition to oral antibiotic, she did have some mild degree of pneumonia probably from aspiration when she fainted  Continue monitor in the ICU until cleared for transfer out by neurosurgery    Summary of recommendations:  Transition to oral antibiotic,   continue with the vasospasm management post subarachnoid hemorrhage    Discussed with the ICU rounding team and with the nursing staff, neurosurgery note reviewed    Labs/Notes/films were independently reviewed and pertinent results are summarized above  The copied texts in this note were reviewed and they are accurate as of 01/19/24    Disposition: Continue to monitor in the ICU    Chris Levine MD  01/19/24  14:44 EST          Dictated utilizing Dragon dictation

## 2024-01-19 NOTE — PLAN OF CARE
Goal Outcome Evaluation:  Plan of Care Reviewed With: patient, family           Outcome Evaluation: Binta Morales is a 58 year old female seen for physical therapy treatment this AM. Pt. was admitted for SAH secondary to reptured ACOM aneurysm s/p embolization w/ successful web placement. Today, pt. performs supine>sit with Min A. She performs STS with standby A however is very impulsive, requiring intermittent CGA-Min A for safety. Pt. requiring CGA-Min A for safety with ambulating ~8 ft to bedside chair with HHA. No LOB however variable step length and some anterior lean. Pt. performs x10 LAQ, ankle pumps and marches while seated in chair. Pt. left with all needs met and within reach. PT recommending home with  vs SNF pending progress.      Anticipated Discharge Disposition (PT): home with home health, skilled nursing facility

## 2024-01-19 NOTE — PLAN OF CARE
Goal Outcome Evaluation:  Plan of Care Reviewed With: patient, family        Progress: no change  Outcome Evaluation: Pt still vasospasming on TCDs, but better than yesterday per neurosurgery. Nimotop given Q4. NIH 0, patient still drowsy but neuro intact. AM CT ordered per NS.

## 2024-01-19 NOTE — PLAN OF CARE
Growing and developing well  Age appropriate anticipatory guidance regarding growth, development, nutrition, vaccination, and safety discussed and handout given to caregiver.    Shift summary: No significant events overnight.    Problem: Adult Inpatient Plan of Care  Goal: Plan of Care Review  Outcome: Ongoing, Progressing  Goal: Patient-Specific Goal (Individualized)  Outcome: Ongoing, Progressing  Goal: Absence of Hospital-Acquired Illness or Injury  Outcome: Ongoing, Progressing  Intervention: Identify and Manage Fall Risk  Recent Flowsheet Documentation  Taken 1/19/2024 0600 by Dedra Vazquez RN  Safety Promotion/Fall Prevention:   assistive device/personal items within reach   clutter free environment maintained   fall prevention program maintained   lighting adjusted   nonskid shoes/slippers when out of bed   safety round/check completed  Taken 1/19/2024 0500 by Dedra Vazquez RN  Safety Promotion/Fall Prevention:   assistive device/personal items within reach   clutter free environment maintained   fall prevention program maintained   lighting adjusted   nonskid shoes/slippers when out of bed   safety round/check completed  Taken 1/19/2024 0400 by Dedra Vazquez RN  Safety Promotion/Fall Prevention:   assistive device/personal items within reach   clutter free environment maintained   fall prevention program maintained   lighting adjusted   nonskid shoes/slippers when out of bed   safety round/check completed  Taken 1/19/2024 0300 by Dedra Vazquez RN  Safety Promotion/Fall Prevention:   assistive device/personal items within reach   clutter free environment maintained   fall prevention program maintained   lighting adjusted   nonskid shoes/slippers when out of bed   safety round/check completed  Taken 1/19/2024 0200 by Dedra Vazquez RN  Safety Promotion/Fall Prevention:   assistive device/personal items within reach   clutter free environment maintained   fall prevention program maintained   lighting adjusted   nonskid shoes/slippers when out of bed   safety round/check completed  Taken 1/19/2024 0100 by Dedra Vazquez RN  Safety Promotion/Fall Prevention:   assistive  device/personal items within reach   clutter free environment maintained   fall prevention program maintained   lighting adjusted   nonskid shoes/slippers when out of bed   safety round/check completed  Taken 1/19/2024 0000 by Dedra Vazquez RN  Safety Promotion/Fall Prevention:   assistive device/personal items within reach   clutter free environment maintained   fall prevention program maintained   lighting adjusted   nonskid shoes/slippers when out of bed   safety round/check completed  Taken 1/18/2024 2300 by Dedra Vazquez RN  Safety Promotion/Fall Prevention:   assistive device/personal items within reach   clutter free environment maintained   fall prevention program maintained   lighting adjusted   nonskid shoes/slippers when out of bed   safety round/check completed  Taken 1/18/2024 2200 by Dedra Vazquez RN  Safety Promotion/Fall Prevention:   assistive device/personal items within reach   clutter free environment maintained   fall prevention program maintained   lighting adjusted   nonskid shoes/slippers when out of bed   safety round/check completed  Taken 1/18/2024 2100 by Dedra Vazquez RN  Safety Promotion/Fall Prevention:   clutter free environment maintained   fall prevention program maintained   lighting adjusted   nonskid shoes/slippers when out of bed   safety round/check completed   activity supervised   assistive device/personal items within reach  Taken 1/18/2024 2000 by Dedra Vazquez RN  Safety Promotion/Fall Prevention:   activity supervised   assistive device/personal items within reach   clutter free environment maintained   fall prevention program maintained   lighting adjusted   nonskid shoes/slippers when out of bed   safety round/check completed  Intervention: Prevent Skin Injury  Recent Flowsheet Documentation  Taken 1/19/2024 0600 by Dedra Vazquez RN  Body Position: position changed independently  Taken 1/19/2024 0400 by Dedra Vazquez RN  Body Position: position changed  independently  Taken 1/19/2024 0200 by Dedra Vazquez RN  Body Position: position changed independently  Taken 1/19/2024 0000 by Dedra Vazquez RN  Body Position: position changed independently  Taken 1/18/2024 2200 by Dedra Vazquez RN  Body Position: position changed independently  Taken 1/18/2024 2000 by Dedra Vazquez RN  Body Position: position changed independently  Intervention: Prevent and Manage VTE (Venous Thromboembolism) Risk  Recent Flowsheet Documentation  Taken 1/19/2024 0400 by Dedra Vazquez RN  Activity Management: bedrest  Taken 1/19/2024 0000 by Dedra Vazquez RN  Activity Management: bedrest  Taken 1/18/2024 2000 by Dedra Vazquez RN  Activity Management: bedrest  VTE Prevention/Management:   bilateral   sequential compression devices on  Range of Motion: active ROM (range of motion) encouraged  Intervention: Prevent Infection  Recent Flowsheet Documentation  Taken 1/19/2024 0600 by Dedra Vazquez RN  Infection Prevention:   environmental surveillance performed   hand hygiene promoted   personal protective equipment utilized   single patient room provided   rest/sleep promoted  Taken 1/19/2024 0500 by Dedra Vazquez RN  Infection Prevention:   environmental surveillance performed   hand hygiene promoted   personal protective equipment utilized   single patient room provided   rest/sleep promoted  Taken 1/19/2024 0400 by eDdra Vazquez RN  Infection Prevention:   environmental surveillance performed   hand hygiene promoted   personal protective equipment utilized   single patient room provided   rest/sleep promoted  Taken 1/19/2024 0300 by Dedra Vazquez RN  Infection Prevention:   environmental surveillance performed   hand hygiene promoted   personal protective equipment utilized   single patient room provided   rest/sleep promoted  Taken 1/19/2024 0200 by Dedra Vazquez RN  Infection Prevention:   environmental surveillance performed   hand hygiene promoted   personal protective equipment  utilized   single patient room provided   rest/sleep promoted  Taken 1/19/2024 0100 by Dedra aVzquez RN  Infection Prevention:   environmental surveillance performed   hand hygiene promoted   personal protective equipment utilized   single patient room provided   rest/sleep promoted  Taken 1/19/2024 0000 by Dedra Vazquez RN  Infection Prevention:   environmental surveillance performed   hand hygiene promoted   personal protective equipment utilized   single patient room provided   rest/sleep promoted  Taken 1/18/2024 2300 by Dedra Vazquez RN  Infection Prevention:   environmental surveillance performed   hand hygiene promoted   personal protective equipment utilized   single patient room provided   rest/sleep promoted  Taken 1/18/2024 2200 by Dedra Vazquez RN  Infection Prevention:   environmental surveillance performed   hand hygiene promoted   personal protective equipment utilized   single patient room provided   rest/sleep promoted  Taken 1/18/2024 2100 by Dedra Vazquez RN  Infection Prevention:   environmental surveillance performed   hand hygiene promoted   personal protective equipment utilized   rest/sleep promoted   single patient room provided  Taken 1/18/2024 2000 by Dedra Vazquez RN  Infection Prevention:   environmental surveillance performed   equipment surfaces disinfected   hand hygiene promoted   personal protective equipment utilized   rest/sleep promoted   single patient room provided  Goal: Optimal Comfort and Wellbeing  Outcome: Ongoing, Progressing  Intervention: Provide Person-Centered Care  Recent Flowsheet Documentation  Taken 1/18/2024 2000 by Dedra Vazquez RN  Trust Relationship/Rapport: care explained  Goal: Readiness for Transition of Care  Outcome: Ongoing, Progressing   Goal Outcome Evaluation:

## 2024-01-19 NOTE — PROGRESS NOTES
[facility] NEUROSURGERY INTRACRANIAL PROGRESS NOTE    PATIENT IDENTIFICATION:   Name:  Binta Morales      MRN:  [id number]     58 y.o.  female               CC: SAH secondary to ruptured ACOM aneurysm postprocedure day 3 for embolization and successful web placement      Subjective     Interval History: No new complaints or events overnight.  More alert today.  Had LP yesterday, headache resolved at rest.  Spouse present at bedside    ROS:  Constitutional: No fever, chills  HEENT: No headache, no vision changes, no tinnitus, no hearing difficulties  Neck: no stiffness  GI: No nausea, vomiting, no swallow difficulties  Neuro: No numbness, tingling, or weakness, no speech difficulties, no balance difficulties    Objective     Vital signs in last 24 hours:  Temp:  [99 °F (37.2 °C)-102.6 °F (39.2 °C)] 99.9 °F (37.7 °C)  Heart Rate:  [] 80  BP: (107-164)/(54-84) 135/62    Intake/Output this shift:  No intake/output data recorded.    Intake/Output last 3 shifts:  No intake/output data recorded.    LABS:  Results from last 7 days   Lab Units 01/19/24  0417 01/18/24  0527 01/17/24  0611   WBC 10*3/mm3 11.98* 14.91* 11.33*   HEMOGLOBIN g/dL 12.5 13.7 12.9   HEMATOCRIT % 37.9 40.1 38.0   PLATELETS 10*3/mm3 295 317 164       Results from last 7 days   Lab Units 01/19/24 0417 01/18/24  0527 01/17/24  0611   SODIUM mmol/L 137 140 136   POTASSIUM mmol/L 4.0 4.2 2.7*   CHLORIDE mmol/L 103 106 102   CO2 mmol/L 20.4* 22.0 20.0*   BUN mg/dL 15 15 13   CREATININE mg/dL 0.51* 0.76 0.63   GLUCOSE mg/dL 119* 124* 122*   CALCIUM mg/dL 9.3 9.9 8.8          IMAGING STUDIES:  CT Head Without Contrast    Result Date: 1/19/2024  Electronically signed by Rhianna Chi MD on 01-19-24 at 0508    IR LUMBAR PUNCTURE DIAGNOSTIC    Result Date: 1/18/2024  Technically successful fluoroscopically guided lumbar puncture.  Reference air kerma: 18.1 mGy  This report was finalized on 1/18/2024 12:42 PM by Dr. Dennis Palumbo M.D on Workstation:  [id number]      CT Head Without Contrast    Result Date: 1/18/2024  Electronically signed by Lux Monahan M.D. on 01-18-24 at 0612    XR Chest 1 View    Result Date: 1/17/2024  1. There is a small patchy airspace opacity at the left apex that is new when compared to chest x-ray earlier today 01/17/2024. I favor it is a localized area of atelectasis over pneumonia but correlate clinically and follow-up chest x-ray could be obtained to reevaluate if clinically indicated. The remainder of  the chest x-ray is normal.  This report was finalized on 1/17/2024 10:08 PM by Dr. Franklyn Baltazar M.D on Workstation: [id number]      XR Chest 1 View    Result Date: 1/17/2024  1. No acute process.   This report was finalized on 1/17/2024 3:14 PM by Dr. Travon Shankar M.D on Workstation: [id number]         I personally viewed and interpreted the patient's chart.    Meds reviewed    Physical exam  Alert, awake, oriented x3  Pupils equal round reactive to light  Extraocular muscles intact  Face symmetric  Speech is fluent and clear  No pronator drift  Motor exam  Bilateral deltoids 5/5, bilateral biceps 5/5, bilateral triceps 5/5, bilateral wrist extension 5/5 bilateral hand  5/5  Bilateral hip flexion 5/5, bilateral knee extension 5/5, bilateral DF/PF 5/5  gait deferred  Able to detect light touch in all 4 extremities  right orbital ecchymosis   Right groin site soft, flat, no hematoma, pedal pulses present    Assessment & Plan     ASSESSMENT:      SAH (subarachnoid hemorrhage)    Ruptured aneurysm of anterior communicating artery    Communicating hydrocephalus    58-year-old female with SAH secondary to ruptured ACOM aneurysm postprocedure day 3 for embolization and successful web placement    PLAN:     Continue to monitor in ICU  Neurochecks q2h  Nimodipine x 21 days  Keppra  SBP goal 100-180 mmHg  Daily TCD's-moderate spasms but improved when compared to 1/18  CT stable.  Repeat in AM.  Okay for DVT prophylaxis     I discussed  "the patient's findings and my recommendations with Dr. Duncan, patient, family, nursing staff, and primary care team    I spent 35 minutes caring for Binta Morales on this date of service. This time includes time spent by me in the following activities: preparing for the visit, reviewing tests, obtaining and/or reviewing a separately obtained history, performing a medically appropriate examination and/or evaluation, counseling and educating the patient/family/caregiver, ordering medications, tests, or procedures, referring and communicating with other health care professionals, documenting information in the medical record, independently interpreting results and communicating that information with the patient/family/caregiver, and care coordination          LOS: 3 days       Alma Delia Gutierrez, APRN  1/19/2024  09:41 EST    \"Dictated utilizing Dragon dictation\".      "

## 2024-01-19 NOTE — PAYOR COMM NOTE
"Trav Morales (58 y.o. Female)                     [facility]IC[facility] CASE REF # [id number]                REPLY TO [facility] DEPT  [phone] OR CALL   STEPHEN DIMAS LPLIO           Date of Birth   1965    Social Security Number       Address   [address] [address]    Home Phone   [phone]    MRN   [id number]       Islam   Jain    Marital Status                               Admission Date   1/16/24    Admission Type   Emergency    Admitting Provider   Chris Levine MD    Attending Provider   Chris Levine MD    Department, Room/Bed   [facility] INTENSIVE CARE, I388/1       Discharge Date       Discharge Disposition       Discharge Destination                                 Attending Provider: Chris Levine MD    Allergies: Latex, Sulfa Antibiotics    Isolation: None   Infection: None   Code Status: CPR    Ht: 165.1 cm (65\")   Wt: 65.7 kg (144 lb 13.5 oz)    Admission Cmt: None   Principal Problem: SAH (subarachnoid hemorrhage) [I60.9]                   Active Insurance as of 1/16/2024       Primary Coverage       Payor Plan Insurance Group Employer/Plan Group    CIGNA CIGNA [id number]       Payor Plan Address Payor Plan Phone Number Payor Plan Fax Number Effective Dates    [address] [phone]  1/1/2020 - None Entered    [place] [id number]         Subscriber Name Subscriber Birth Date Member ID       TRAV MORALES 1965 [id number]                     Emergency Contacts        (Rel.) Home Phone Work Phone Mobile Phone    REJI Morales (Spouse) [phone] -- --              Vital Signs (last day)       Date/Time Temp Temp src Pulse Resp BP Patient Position SpO2    01/19/24 1200 -- -- 78 -- 151/82 -- 94    01/19/24 1100 99.8 (37.7) Oral 69 -- 129/70 -- 98    01/19/24 1000 -- -- 86 -- 125/66 -- 95    01/19/24 0900 -- -- 80 -- 135/62 -- 93    01/19/24 0800 -- -- 87 -- 156/83 -- 93    01/19/24 0700 99.9 (37.7) " Oral 92 -- 149/80 -- 94    01/19/24 0600 -- -- 84 -- 134/73 -- 94    01/19/24 0500 -- -- 82 -- 132/62 -- 94    01/19/24 0400 100.1 (37.8) Oral 89 -- 163/78 -- 96    01/19/24 0300 -- -- 100 -- 161/83 -- 94    01/19/24 0200 -- -- 100 -- 145/72 -- 94    01/19/24 0100 -- -- 73 -- 144/81 -- 99    01/19/24 0013 99.1 (37.3) Oral -- -- -- -- --    01/19/24 0000 -- -- 71 -- 136/79 -- 94    01/18/24 2300 -- -- 67 -- 139/79 -- 91    01/18/24 2200 -- -- 67 -- 120/76 -- 97    01/18/24 2120 -- -- 66 -- -- -- 88    01/18/24 2100 -- -- 80 -- 107/54 -- 91    01/18/24 2036 99.8 (37.7) Oral -- -- -- -- --    01/18/24 2000 -- -- 93 -- 135/61 -- 93    01/18/24 1910 102.6 (39.2) -- 97 -- 152/74 -- 92    01/18/24 1900 -- -- 97 -- -- -- 92    01/18/24 1831 -- -- -- -- 157/71 -- --    01/18/24 1800 -- -- 93 -- 148/70 -- 92    01/18/24 1730 -- -- 90 -- 133/66 -- 93    01/18/24 1700 -- -- 96 -- 127/62 -- 94    01/18/24 1630 -- -- 93 -- 154/77 -- 93    01/18/24 1600 -- -- 93 -- 151/76 -- 93    01/18/24 1530 -- -- 108 -- 163/84 -- 96    01/18/24 1500 -- -- 80 -- 147/70 -- 95    01/18/24 1430 -- -- 85 -- 155/82 -- 96    01/18/24 1400 99 (37.2) -- 77 -- 136/69 -- 94    01/18/24 1330 -- -- 80 -- 133/61 -- 93    01/18/24 1300 -- -- 93 -- 151/76 -- 96    01/18/24 1230 -- -- 90 -- 155/75 -- 96    01/18/24 1220 -- -- 87 -- 153/80 -- 97    01/18/24 1100 -- -- 87 -- 126/64 -- 97    01/18/24 1030 -- -- 88 -- 128/55 -- 93    01/18/24 1000 -- -- 98 -- 164/79 -- 95    01/18/24 0930 -- -- 88 -- 152/88 -- 93    01/18/24 0900 -- -- 80 -- 146/80 -- 92    01/18/24 0830 -- -- 93 -- 139/68 -- 94    01/18/24 0800 -- -- 113 -- 163/87 -- 94    01/18/24 0738 100.1 (37.8) Oral -- -- -- -- --    01/18/24 0700 -- -- 85 -- 153/77 -- 94    01/18/24 0600 -- -- 75 -- 126/62 -- 92    01/18/24 0512 99 (37.2) Oral -- -- -- -- --    01/18/24 0500 -- -- 97 -- 145/78 -- 96    01/18/24 0400 -- -- 76 -- 136/73 -- 97    01/18/24 0300 -- -- 78 -- 133/75 -- 96    01/18/24 0200 -- -- 77  -- 143/81 -- 98    01/18/24 0100 -- -- 69 -- 111/59 -- 92    01/18/24 0026 98.7 (37.1) Oral -- -- -- -- --    01/18/24 0000 -- -- 69 -- 96/64 -- 91          Oxygen Therapy (last day)       Date/Time SpO2 Device (Oxygen Therapy) Flow (L/min) Oxygen Concentration (%) ETCO2 (mmHg)    01/19/24 1200 94 -- -- -- --    01/19/24 1100 98 -- -- -- --    01/19/24 1000 95 -- -- -- --    01/19/24 0900 93 -- -- -- --    01/19/24 0800 93 -- -- -- --    01/19/24 0700 94 -- -- -- --    01/19/24 0600 94 -- -- -- --    01/19/24 0500 94 -- -- -- --    01/19/24 0400 96 room air -- -- --    01/19/24 0300 94 -- -- -- --    01/19/24 0200 94 -- -- -- --    01/19/24 0100 99 -- -- -- --    01/19/24 0000 94 room air -- -- --    01/18/24 2300 91 -- -- -- --    01/18/24 2200 97 -- -- -- --    01/18/24 2120 88 nasal cannula 2 -- --    01/18/24 2100 91 -- -- -- --    01/18/24 2000 93 room air -- -- --    01/18/24 1910 92 -- -- -- --    01/18/24 1900 92 -- -- -- --    01/18/24 1800 92 -- -- -- --    01/18/24 1730 93 -- -- -- --    01/18/24 1700 94 -- -- -- --    01/18/24 1630 93 -- -- -- --    01/18/24 1600 93 -- -- -- --    01/18/24 1530 96 -- -- -- --    01/18/24 1500 95 -- -- -- --    01/18/24 1430 96 -- -- -- --    01/18/24 1400 94 -- -- -- --    01/18/24 1330 93 -- -- -- --    01/18/24 1300 96 -- -- -- --    01/18/24 1230 96 -- -- -- --    01/18/24 1220 97 -- -- -- --    01/18/24 1200 -- room air -- -- --    01/18/24 1100 97 -- -- -- --    01/18/24 1030 93 -- -- -- --    01/18/24 1000 95 -- -- -- --    01/18/24 0930 93 -- -- -- --    01/18/24 0900 92 -- -- -- --    01/18/24 0830 94 -- -- -- --    01/18/24 0800 94 room air 2 -- --    01/18/24 0700 94 -- -- -- --    01/18/24 0600 92 -- -- -- --    01/18/24 0500 96 -- -- -- --    01/18/24 0400 97 -- -- -- --    01/18/24 0300 96 -- -- -- --    01/18/24 0200 98 -- -- -- --    01/18/24 0100 92 -- -- -- --    01/18/24 0000 91 room air -- -- --          Lines, Drains & Airways       Active LDAs        Name Placement date Placement time Site Days    Peripheral IV 01/17/24 2156 Distal;Posterior;Right Forearm 01/17/24 2156  Forearm  1                  Orders (last 24 hrs)        Start     Ordered    01/20/24 0600  cefTRIAXone (ROCEPHIN) 2,000 mg in sodium chloride 0.9 % 100 mL IVPB-VTB  Every 24 Hours         01/19/24 0900    01/20/24 0500  CT Head Without Contrast  1 Time Imaging         01/19/24 0943    01/19/24 0500  CT Head Without Contrast  1 Time Imaging         01/18/24 1108    01/18/24 1200  Vital Signs - Every 1 Hour x4  Every Hour       01/18/24 1137    01/18/24 1200  Vital Signs - Every 4 Hours  Every 4 Hours       01/18/24 1137    01/18/24 0951  Hold medication  Continuous PRN         01/18/24 0952    01/18/24 0600  cefTRIAXone (ROCEPHIN) 1,000 mg in sodium chloride 0.9 % 100 mL IVPB-VTB  Every 24 Hours,   Status:  Discontinued         01/18/24 0512    01/17/24 2205  Incentive Spirometry  Every 4 Hours While Awake       01/17/24 2204    01/17/24 1200  POC Glucose Q6H  Every 6 Hours,   Status:  Canceled      Comments: May Discontinue After 2 Consecutive Readings Less Than 140Notify Provider if 2 Readings Greater Than 140      01/17/24 0828    01/17/24 1032  Transcranial Doppler Ultrasound  Daily      Comments: Daily until discontinued    01/17/24 1031    01/17/24 0600  CBC (No Diff)  Daily       01/16/24 1532    01/17/24 0600  Basic Metabolic Panel  Daily       01/16/24 1532    01/16/24 2130  atorvastatin (LIPITOR) tablet 20 mg  Nightly         01/16/24 1524    01/16/24 2100  levETIRAcetam (KEPPRA) injection 500 mg  Every 12 Hours Scheduled         01/16/24 1532    01/16/24 2100  sodium chloride 0.9 % flush 10 mL  Every 12 Hours Scheduled         01/16/24 1532    01/16/24 2100  sennosides-docusate (PERICOLACE) 8.6-50 MG per tablet 2 tablet  2 Times Daily        See Hyperspace for full Linked Orders Report.    01/16/24 1532    01/16/24 1831  aspirin EC tablet 81 mg  Daily         01/16/24 1829    01/16/24  1829  NIHSS Assessment on arrival to unit for all stroke patients  Every Shift      Comments: Turn off all sedation medications prior to performing assessment. Assessment to be performed upon admission, transfer to another unit, discharge, and with neurological decline. If NIHSS change is greater than or equal to 4 and/or neurological decline is noted notify physician.    01/16/24 1829    01/16/24 1600  Neuro Checks  Every 2 Hours       01/16/24 1532    01/16/24 1600  Vital Signs Every Hour and Per Hospital Policy Based on Patient Condition  Every Hour       01/16/24 1532    01/16/24 1600  Intake & Output  Every Hour       01/16/24 1532    01/16/24 1533  Daily Weights  Daily       01/16/24 1532    01/16/24 1532  acetaminophen (TYLENOL) tablet 650 mg  Every 4 Hours PRN        See Hyperspace for full Linked Orders Report.    01/16/24 1532    01/16/24 1532  acetaminophen (TYLENOL) suppository 325 mg  Every 4 Hours PRN        See Hyperspace for full Linked Orders Report.    01/16/24 1532    01/16/24 1532  Potassium Replacement - Follow Nurse / BPA Driven Protocol  As Needed         01/16/24 1532    01/16/24 1532  Magnesium Standard Dose Replacement - Follow Nurse / BPA Driven Protocol  As Needed         01/16/24 1532    01/16/24 1532  Phosphorus Replacement - Follow Nurse / BPA Driven Protocol  As Needed         01/16/24 1532    01/16/24 1532  Calcium Replacement - Follow Nurse / BPA Driven Protocol  As Needed         01/16/24 1532    01/16/24 1532  Oral Care - Patient Not on NPPV & Not Intubated  Every Shift       01/16/24 1532    01/16/24 1531  bisacodyl (DULCOLAX) EC tablet 5 mg  Daily PRN        See Hyperspace for full Linked Orders Report.    01/16/24 1532    01/16/24 1531  bisacodyl (DULCOLAX) suppository 10 mg  Daily PRN        See Hyperspace for full Linked Orders Report.    01/16/24 1532    01/16/24 1531  nitroglycerin (NITROSTAT) SL tablet 0.4 mg  Every 5 Minutes PRN         01/16/24 1532    01/16/24 1531   sodium chloride 0.9 % flush 10 mL  As Needed         01/16/24 1532    01/16/24 1531  sodium chloride 0.9 % infusion 40 mL  As Needed         01/16/24 1532    01/16/24 1531  polyethylene glycol (MIRALAX) packet 17 g  Daily PRN        See Hyperspace for full Linked Orders Report.    01/16/24 1532    01/16/24 1531  ondansetron (ZOFRAN) injection 4 mg  Every 6 Hours PRN         01/16/24 1532    01/16/24 1400  Neuro Checks  Every Hour       01/16/24 1337    01/16/24 1350  niMODipine (NIMOTOP) capsule 60 mg  Every 4 Hours Scheduled        Note to Pharmacy: 1st dose STAT    01/16/24 1334    01/16/24 1316  niCARdipine (CARDENE) 25 mg in 250 mL NS infusion kit  Titrated,   Status:  Discontinued         01/16/24 1300    Unscheduled  Oxygen Therapy- Nasal Cannula; Titrate 1-6 LPM Per SpO2; 90 - 95%  Continuous PRN       01/16/24 1829    Unscheduled  Order CT Head Without Contrast for Neurological Decline  As Needed       01/17/24 0828    Unscheduled  Bladder Scan if Patient Unable to Void 4-6 Hours After Catheter Removal  As Needed         01/17/24 1045    Unscheduled  If Bladder Scan Volume is Less Than 500mL & Patient is Without Symptoms of Bladder Discomfort / Distention Monitor Every 1-2 Hours for Spontaneous Void  As Needed       01/17/24 1045    Unscheduled  Straight Cath Every 4-6 Hours As Needed If Patient is Unable to Void After 4-6 Hours, Bladder Scan Volume is Greater Than 500mL & Patient Has Symptoms of Bladder Discomfort / Distention  As Needed       01/17/24 1045    Unscheduled  Schedule / Prompt Voiding For Patients With Urinary Incontinence  As Needed       01/17/24 1045    --  amLODIPine (NORVASC) 5 MG tablet  Nightly         01/16/24 1321    --  lisinopril-hydrochlorothiazide (PRINZIDE,ZESTORETIC) 20-25 MG per tablet  Nightly         01/16/24 1321    --  cetirizine (zyrTEC) 5 MG tablet  Daily         01/16/24 1321    --  vitamin B-12 (CYANOCOBALAMIN) 100 MCG tablet  Daily         01/16/24 1321    --   Cholecalciferol 25 MCG (1000 UT) tablet  Daily         01/16/24 1321    --  SCANNED - IMAGING         01/16/24 0000                     Operative/Procedure Notes (all)        Angelito Gamble MD at 01/16/24 1559  Version 1 of 1         POST PROCEDURE NOTE    Procedure: Cerebral Embolization    Pre-Procedure Diagnosis: SAH    Post-procedure Diagnosis: Ruptured Left Acom Aneurysm    Findings: 6 mm Acom aneurysm on the left with successful WEB placement. Post embolization occlusion achieved. Official report to follow. 8F Angioseal placed R CFA puncture site.    Complications: None    Blood loss: 150 cc    Specimen Removed: None    Disposition:   Transfer to Neuro ICU      Electronically signed by Angelito Gamble MD at 01/16/24 1751       Angelito Gamble MD at 01/16/24 1559  Version 1 of 1         See report under radiology PACS tab.    Electronically signed by Angelito Gamble MD at 01/16/24 1752          Physician Progress Notes (all)        Alma Delia Gutierrez APRN at 01/19/24 0941            [facility] NEUROSURGERY INTRACRANIAL PROGRESS NOTE    PATIENT IDENTIFICATION:   Name:  Binta Morales      MRN:  [id number]     58 y.o.  female               CC: SAH secondary to ruptured ACOM aneurysm postprocedure day 3 for embolization and successful web placement      Subjective     Interval History: No new complaints or events overnight.  More alert today.  Had LP yesterday, headache resolved at rest.  Spouse present at bedside    ROS:  Constitutional: No fever, chills  HEENT: No headache, no vision changes, no tinnitus, no hearing difficulties  Neck: no stiffness  GI: No nausea, vomiting, no swallow difficulties  Neuro: No numbness, tingling, or weakness, no speech difficulties, no balance difficulties    Objective     Vital signs in last 24 hours:  Temp:  [99 °F (37.2 °C)-102.6 °F (39.2 °C)] 99.9 °F (37.7 °C)  Heart Rate:  [] 80  BP: (107-164)/(54-84) 135/62    Intake/Output this shift:  No intake/output data  recorded.    Intake/Output last 3 shifts:  No intake/output data recorded.    LABS:  Results from last 7 days   Lab Units 01/19/24 0417 01/18/24  0527 01/17/24  0611   WBC 10*3/mm3 11.98* 14.91* 11.33*   HEMOGLOBIN g/dL 12.5 13.7 12.9   HEMATOCRIT % 37.9 40.1 38.0   PLATELETS 10*3/mm3 295 317 164       Results from last 7 days   Lab Units 01/19/24 0417 01/18/24  0527 01/17/24  0611   SODIUM mmol/L 137 140 136   POTASSIUM mmol/L 4.0 4.2 2.7*   CHLORIDE mmol/L 103 106 102   CO2 mmol/L 20.4* 22.0 20.0*   BUN mg/dL 15 15 13   CREATININE mg/dL 0.51* 0.76 0.63   GLUCOSE mg/dL 119* 124* 122*   CALCIUM mg/dL 9.3 9.9 8.8          IMAGING STUDIES:  CT Head Without Contrast    Result Date: 1/19/2024  Electronically signed by Rhianna Chi MD on 01-19-24 at 0508    IR LUMBAR PUNCTURE DIAGNOSTIC    Result Date: 1/18/2024  Technically successful fluoroscopically guided lumbar puncture.  Reference air kerma: 18.1 mGy  This report was finalized on 1/18/2024 12:42 PM by Dr. Dennis Palumbo M.D on Workstation: [id number]      CT Head Without Contrast    Result Date: 1/18/2024  Electronically signed by Lux Monahan M.D. on 01-18-24 at 0612    XR Chest 1 View    Result Date: 1/17/2024  1. There is a small patchy airspace opacity at the left apex that is new when compared to chest x-ray earlier today 01/17/2024. I favor it is a localized area of atelectasis over pneumonia but correlate clinically and follow-up chest x-ray could be obtained to reevaluate if clinically indicated. The remainder of  the chest x-ray is normal.  This report was finalized on 1/17/2024 10:08 PM by Dr. Franklyn Baltazar M.D on Workstation: [id number]      XR Chest 1 View    Result Date: 1/17/2024  1. No acute process.   This report was finalized on 1/17/2024 3:14 PM by Dr. Travon Shankar M.D on Workstation: [id number]         I personally viewed and interpreted the patient's chart.    Meds reviewed    Physical exam  Alert, awake, oriented x3  Pupils equal  "round reactive to light  Extraocular muscles intact  Face symmetric  Speech is fluent and clear  No pronator drift  Motor exam  Bilateral deltoids 5/5, bilateral biceps 5/5, bilateral triceps 5/5, bilateral wrist extension 5/5 bilateral hand  5/5  Bilateral hip flexion 5/5, bilateral knee extension 5/5, bilateral DF/PF 5/5  gait deferred  Able to detect light touch in all 4 extremities  right orbital ecchymosis   Right groin site soft, flat, no hematoma, pedal pulses present    Assessment & Plan     ASSESSMENT:      SAH (subarachnoid hemorrhage)    Ruptured aneurysm of anterior communicating artery    Communicating hydrocephalus    58-year-old female with SAH secondary to ruptured ACOM aneurysm postprocedure day 3 for embolization and successful web placement    PLAN:     Continue to monitor in ICU  Neurochecks q2h  Nimodipine x 21 days  Keppra  SBP goal 100-180 mmHg  Daily TCD's-moderate spasms but improved when compared to 1/18  CT stable.  Repeat in AM.  Okay for DVT prophylaxis     I discussed the patient's findings and my recommendations with Dr. Duncan, patient, family, nursing staff, and primary care team    I spent 35 minutes caring for Binta Morales on this date of service. This time includes time spent by me in the following activities: preparing for the visit, reviewing tests, obtaining and/or reviewing a separately obtained history, performing a medically appropriate examination and/or evaluation, counseling and educating the patient/family/caregiver, ordering medications, tests, or procedures, referring and communicating with other health care professionals, documenting information in the medical record, independently interpreting results and communicating that information with the patient/family/caregiver, and care coordination          LOS: 3 days       RENNY Maldonado  1/19/2024  09:41 EST    \"Dictated utilizing Dragon dictation\".        Electronically signed by Alma Delia Gutierrez APRN " "at 24 0946       Chris Levine MD at 24 1300            PROGRESS NOTE  Patient Name: Binta Morales  Age/Sex: 58 y.o. female  : 1965  MRN: [id number]    Date of Admission: 2024  Date of Encounter Visit: 24   LOS: 2 days   Patient Care Team:  Alma Delia Rudolph PA-C as PCP - General (Physician Assistant)    Chief Complaint: Subarachnoid hemorrhage due to ACOM aneurysm    Hospital course: Patient had some lethargy earlier today however that seems to be her normal morning routines, she was doing much better on later assessment and this is not unusual according to her  at baseline.  She still complaining of Headache and she was seen by neurosurgery with the plan to consider lumbar puncture to help with that  She is afebrile  She did have some hypoxemia yesterday, and then she developed some fever with some leukocytosis, the chest x-ray showed some questionable infiltrate but I am not that impressed with but the patient was started on Rocephin.        REVIEW OF SYSTEMS:   CONSTITUTIONAL: no fever or chills  CARDIOVASCULAR: No chest pain, chest pressure or chest discomfort. No palpitations or edema.   RESPIRATORY: No shortness of breath, cough or sputum.   GASTROINTESTINAL: No anorexia, nausea, vomiting or diarrhea. No abdominal pain or blood.   HEMATOLOGIC: No bleeding or bruising.  Except for the right periorbital ecchymosis  Positive headache    Ventilator/Non-Invasive Ventilation Settings (From admission, onward)      None              Vital Signs  Temp:  [98.7 °F (37.1 °C)-101.6 °F (38.7 °C)] 100.1 °F (37.8 °C)  Heart Rate:  [69-97] 80  BP: ()/(51-81) 146/80  SpO2:  [88 %-98 %] 92 %  on  Flow (L/min):  [2] 2 Device (Oxygen Therapy): room air  No intake or output data in the 24 hours ending 24 1300    Flowsheet Rows      Flowsheet Row First Filed Value   Admission Height 165.1 cm (65\") Documented at 2024 1009   Admission Weight 68.9 kg (151 lb 14.4 oz) Documented " at 01/17/2024 0545          Body mass index is 24.1 kg/m².      01/17/24  0545 01/18/24  0500   Weight: 68.9 kg (151 lb 14.4 oz) 65.7 kg (144 lb 13.5 oz)       Physical Exam:  GEN:  No acute distress, alert, cooperative, well developed   EYES:   Sclerae clear. No icterus. PERRL. Normal EOM.  She has raccoon eye on the right side from her previous trauma  ENT:   External ears/nose normal, no oral lesions, no thrush, mucous membranes moist  NECK:  Supple, midline trachea, no JVD  LUNGS: Normal chest on inspection, clear to auscultation no wheezes or crackles or rhonchi with good breath sounds bilaterally. Respirations regular, even and unlabored.   CV:  Regular rhythm and rate. Normal S1/S2. No murmurs, gallops, or rubs noted.  ABD:  Soft, nontender and nondistended. Normal bowel sounds. No guarding  EXT:  Moves all extremities well. No cyanosis. No redness. No edema.   Skin: Dry, intact, no bleeding    Results Review:    Results From Last 14 Days   Lab Units 01/18/24 0527 01/17/24  2124   LACTATE mmol/L  --  1.0   TRIGLYCERIDES mg/dL 120  --      Results from last 7 days   Lab Units 01/18/24 0527 01/17/24  0611 01/16/24  1138   SODIUM mmol/L 140 136 138   POTASSIUM mmol/L 4.2 2.7* 3.4*   CHLORIDE mmol/L 106 102 98   CO2 mmol/L 22.0 20.0* 22.0   BUN mg/dL 15 13 20   CREATININE mg/dL 0.76 0.63 0.87   CALCIUM mg/dL 9.9 8.8 10.3   AST (SGOT) U/L  --   --  17   ALT (SGPT) U/L  --   --  39*   ANION GAP mmol/L 12.0 14.0 18.0*   ALBUMIN g/dL  --   --  4.3     Results from last 7 days   Lab Units 01/16/24  1138   HSTROP T ng/L 34*             Results from last 7 days   Lab Units 01/18/24 0527 01/17/24  0611 01/16/24  1138   WBC 10*3/mm3 14.91* 11.33* 12.30*   HEMOGLOBIN g/dL 13.7 12.9 15.7   HEMATOCRIT % 40.1 38.0 47.2*   PLATELETS 10*3/mm3 317 164 373   MCV fL 85.3 87.4 83.8   NEUTROPHIL % %  --   --  84.0*   LYMPHOCYTE % %  --   --  10.6*   MONOCYTES % %  --   --  4.8*   EOSINOPHIL % %  --   --  0.0*   BASOPHIL % %  --    --  0.2   IMM GRAN % %  --   --  0.4     Results from last 7 days   Lab Units 01/16/24  1253   INR  1.09   APTT seconds 29.8     Results from last 7 days   Lab Units 01/17/24  0611   MAGNESIUM mg/dL 2.4     Results from last 7 days   Lab Units 01/18/24  0527   CHOLESTEROL mg/dL 143   TRIGLYCERIDES mg/dL 120   HDL CHOL mg/dL 41         Results from last 7 days   Lab Units 01/18/24  0527   HEMOGLOBIN A1C % 5.80*     Glucose   Date/Time Value Ref Range Status   01/18/2024 0022 122 70 - 130 mg/dL Final   01/17/2024 1200 134 (H) 70 - 130 mg/dL Final   01/16/2024 2019 115 70 - 130 mg/dL Final     Results from last 7 days   Lab Units 01/17/24  2124   PROCALCITONIN ng/mL 0.22   LACTATE mmol/L 1.0     Results from last 7 days   Lab Units 01/17/24  2239   URINECX  >100,000 CFU/mL Gram Negative Bacilli*     Results from last 7 days   Lab Units 01/17/24  2239   NITRITE UA  Negative   WBC UA /HPF Too Numerous to Count*   BACTERIA UA /HPF 4+*   SQUAM EPITHEL UA /HPF 0-2   URINECX  >100,000 CFU/mL Gram Negative Bacilli*                   Imaging:   Imaging Results (All)        I reviewed the patient's new clinical results.  I personally viewed and interpreted the patient's imaging results:        Medication Review:   aspirin, 81 mg, Oral, Daily  atorvastatin, 20 mg, Oral, Nightly  cefTRIAXone, 1,000 mg, Intravenous, Q24H  levETIRAcetam, 500 mg, Intravenous, Q12H  niMODipine, 60 mg, Oral, Q4H  senna-docusate sodium, 2 tablet, Oral, BID  sodium chloride, 10 mL, Intravenous, Q12H        hold, 1 each  niCARdipine, 5-15 mg/hr, Last Rate: Stopped (01/17/24 1200)        ASSESSMENT:   ACOM aneurysm status post rupture and repair  Subarachnoid hemorrhage secondary to above  Hypoxemia mainly while asleep with sats of 88% on room air while asleep  Hypertension by history    PLAN:  Case was discussed with neurosurgery, they are planning on lumbar puncture and CSF drainage to help with the hydrocephalus and to help with the headache  Patient  had questionable pneumonia, this may have happened when she vomited when she was unconscious and she is now on Rocephin which will be continued  Chest x-ray reviewed showed no impressive finding except for some elevated right hemidiaphragm  Neurologically she is unchanged on my assessment however she had some lethargy earlier and she is still complaining of the headache  12.  Dose was almost held this morning because of the blood pressure, she was hypertensive and now she was borderline hypotensive.  We will leave clear instruction not to hold the medication and notify us so that we can provide further measures to allow the administration of the nimodipine  Continue the Keppra for the seizure prophylaxis.  She is back on aspirin as per neurosurgery.      Summary of recommendations:  Rocephin to be continued  LP for CSF drainage  Continue to monitor in the ICU  Continue with the monitoring for any cerebral vasospasm and administer the nimodipine as scheduled    Labs/Notes/films were independently reviewed and pertinent results are summarized above  The copied texts in this note were reviewed and they are accurate as of 01/18/24    Disposition: Continue to monitor in the ICU    Chris Levine MD  01/18/24  13:00 EST          Dictated utilizing Dragon dictation    Electronically signed by Chris Levine MD at 01/18/24 0288

## 2024-01-20 ENCOUNTER — APPOINTMENT (OUTPATIENT)
Dept: CT IMAGING | Facility: HOSPITAL | Age: 59
DRG: 020 | End: 2024-01-20
Payer: COMMERCIAL

## 2024-01-20 LAB
ANION GAP SERPL CALCULATED.3IONS-SCNC: 15 MMOL/L (ref 5–15)
BUN SERPL-MCNC: 13 MG/DL (ref 6–20)
BUN/CREAT SERPL: 22.8 (ref 7–25)
CALCIUM SPEC-SCNC: 9.1 MG/DL (ref 8.6–10.5)
CHLORIDE SERPL-SCNC: 104 MMOL/L (ref 98–107)
CO2 SERPL-SCNC: 22 MMOL/L (ref 22–29)
CREAT SERPL-MCNC: 0.57 MG/DL (ref 0.57–1)
DEPRECATED RDW RBC AUTO: 36.9 FL (ref 37–54)
EGFRCR SERPLBLD CKD-EPI 2021: 104.8 ML/MIN/1.73
ERYTHROCYTE [DISTWIDTH] IN BLOOD BY AUTOMATED COUNT: 12.3 % (ref 12.3–15.4)
GLUCOSE SERPL-MCNC: 103 MG/DL (ref 65–99)
HCT VFR BLD AUTO: 38.3 % (ref 34–46.6)
HGB BLD-MCNC: 13.2 G/DL (ref 12–15.9)
MCH RBC QN AUTO: 29 PG (ref 26.6–33)
MCHC RBC AUTO-ENTMCNC: 34.5 G/DL (ref 31.5–35.7)
MCV RBC AUTO: 84.2 FL (ref 79–97)
PLATELET # BLD AUTO: 338 10*3/MM3 (ref 140–450)
PMV BLD AUTO: 9.3 FL (ref 6–12)
POTASSIUM SERPL-SCNC: 3.7 MMOL/L (ref 3.5–5.2)
RBC # BLD AUTO: 4.55 10*6/MM3 (ref 3.77–5.28)
SODIUM SERPL-SCNC: 141 MMOL/L (ref 136–145)
WBC NRBC COR # BLD AUTO: 10.32 10*3/MM3 (ref 3.4–10.8)

## 2024-01-20 PROCEDURE — 25010000002 LEVETRIRACETAM PER 10 MG: Performed by: INTERNAL MEDICINE

## 2024-01-20 PROCEDURE — 85027 COMPLETE CBC AUTOMATED: CPT | Performed by: INTERNAL MEDICINE

## 2024-01-20 PROCEDURE — 70450 CT HEAD/BRAIN W/O DYE: CPT

## 2024-01-20 PROCEDURE — 99223 1ST HOSP IP/OBS HIGH 75: CPT | Performed by: PSYCHIATRY & NEUROLOGY

## 2024-01-20 PROCEDURE — 80048 BASIC METABOLIC PNL TOTAL CA: CPT | Performed by: INTERNAL MEDICINE

## 2024-01-20 PROCEDURE — 97530 THERAPEUTIC ACTIVITIES: CPT

## 2024-01-20 PROCEDURE — 25810000003 SODIUM CHLORIDE 0.9 % SOLUTION

## 2024-01-20 PROCEDURE — 25010000002 PHENYLEPHRINE 10 MG/ML SOLUTION

## 2024-01-20 RX ORDER — SODIUM CHLORIDE 450 MG/100ML
100 INJECTION, SOLUTION INTRAVENOUS CONTINUOUS
Status: DISCONTINUED | OUTPATIENT
Start: 2024-01-20 | End: 2024-01-26 | Stop reason: HOSPADM

## 2024-01-20 RX ORDER — PHENYLEPHRINE HCL IN 0.9% NACL 0.5 MG/5ML
.5-3 SYRINGE (ML) INTRAVENOUS
Status: DISCONTINUED | OUTPATIENT
Start: 2024-01-21 | End: 2024-01-24

## 2024-01-20 RX ADMIN — LEVETIRACETAM 500 MG: 500 INJECTION, SOLUTION INTRAVENOUS at 12:11

## 2024-01-20 RX ADMIN — ACETAMINOPHEN 650 MG: 325 TABLET, FILM COATED ORAL at 20:15

## 2024-01-20 RX ADMIN — NIMODIPINE 60 MG: 30 CAPSULE, LIQUID FILLED ORAL at 23:44

## 2024-01-20 RX ADMIN — NIMODIPINE 60 MG: 30 CAPSULE, LIQUID FILLED ORAL at 03:30

## 2024-01-20 RX ADMIN — SODIUM CHLORIDE 100 ML/HR: 4.5 INJECTION, SOLUTION INTRAVENOUS at 21:21

## 2024-01-20 RX ADMIN — ATORVASTATIN CALCIUM 20 MG: 20 TABLET, FILM COATED ORAL at 20:11

## 2024-01-20 RX ADMIN — Medication 10 ML: at 08:15

## 2024-01-20 RX ADMIN — NIMODIPINE 60 MG: 30 CAPSULE, LIQUID FILLED ORAL at 20:11

## 2024-01-20 RX ADMIN — LEVETIRACETAM 500 MG: 500 INJECTION, SOLUTION INTRAVENOUS at 23:43

## 2024-01-20 RX ADMIN — ASPIRIN 81 MG: 81 TABLET, COATED ORAL at 08:16

## 2024-01-20 RX ADMIN — Medication 10 ML: at 20:18

## 2024-01-20 RX ADMIN — NIMODIPINE 60 MG: 30 CAPSULE, LIQUID FILLED ORAL at 16:00

## 2024-01-20 RX ADMIN — SODIUM CHLORIDE 100 ML/HR: 4.5 INJECTION, SOLUTION INTRAVENOUS at 12:19

## 2024-01-20 RX ADMIN — AMOXICILLIN AND CLAVULANATE POTASSIUM 1 TABLET: 875; 125 TABLET, FILM COATED ORAL at 20:11

## 2024-01-20 RX ADMIN — NIMODIPINE 60 MG: 30 CAPSULE, LIQUID FILLED ORAL at 08:14

## 2024-01-20 RX ADMIN — PHENYLEPHRINE HYDROCHLORIDE 0.5 MCG/KG/MIN: 50 INJECTION INTRAVENOUS at 23:55

## 2024-01-20 RX ADMIN — NIMODIPINE 60 MG: 30 CAPSULE, LIQUID FILLED ORAL at 12:12

## 2024-01-20 RX ADMIN — AMOXICILLIN AND CLAVULANATE POTASSIUM 1 TABLET: 875; 125 TABLET, FILM COATED ORAL at 08:14

## 2024-01-20 NOTE — PROGRESS NOTES
No events.  Patient denies any significant headache    Awake and alert  Extraocular movements intact  No drift  Moving all extremities    CT brain: Stable compared to previous CT    Plan: 58-year-old female status post coiling of ruptured ACOM aneurysm  -Neuro stable; no evidence of symptomatic vasospasm  -Continue SAH protocol  -CT stable  -ICU care

## 2024-01-20 NOTE — PLAN OF CARE
Problem: Adult Inpatient Plan of Care  Goal: Plan of Care Review  Outcome: Ongoing, Progressing  Goal: Patient-Specific Goal (Individualized)  Outcome: Ongoing, Progressing  Goal: Absence of Hospital-Acquired Illness or Injury  Outcome: Ongoing, Progressing  Intervention: Identify and Manage Fall Risk  Recent Flowsheet Documentation  Taken 1/20/2024 0600 by Dedra Vazquez RN  Safety Promotion/Fall Prevention:   assistive device/personal items within reach   clutter free environment maintained   fall prevention program maintained   lighting adjusted   nonskid shoes/slippers when out of bed   safety round/check completed  Taken 1/20/2024 0500 by Dedra Vazquez RN  Safety Promotion/Fall Prevention:   assistive device/personal items within reach   clutter free environment maintained   fall prevention program maintained   lighting adjusted   nonskid shoes/slippers when out of bed   safety round/check completed  Taken 1/20/2024 0400 by Dedra Vazquez RN  Safety Promotion/Fall Prevention:   assistive device/personal items within reach   clutter free environment maintained   fall prevention program maintained   lighting adjusted   nonskid shoes/slippers when out of bed   safety round/check completed  Taken 1/20/2024 0300 by Dedra Vazquez RN  Safety Promotion/Fall Prevention:   assistive device/personal items within reach   clutter free environment maintained   fall prevention program maintained   lighting adjusted   nonskid shoes/slippers when out of bed   safety round/check completed  Taken 1/20/2024 0200 by Dedra Vazquez RN  Safety Promotion/Fall Prevention:   assistive device/personal items within reach   clutter free environment maintained   fall prevention program maintained   lighting adjusted   nonskid shoes/slippers when out of bed   safety round/check completed  Taken 1/20/2024 0100 by Dedra Vazquez RN  Safety Promotion/Fall Prevention:   assistive device/personal items within reach   clutter free environment  maintained   fall prevention program maintained   lighting adjusted   nonskid shoes/slippers when out of bed   safety round/check completed  Taken 1/20/2024 0000 by Dedra Vazquez RN  Safety Promotion/Fall Prevention:   assistive device/personal items within reach   clutter free environment maintained   fall prevention program maintained   lighting adjusted   nonskid shoes/slippers when out of bed   safety round/check completed  Taken 1/19/2024 2300 by Dedra Vazquez RN  Safety Promotion/Fall Prevention:   assistive device/personal items within reach   clutter free environment maintained   fall prevention program maintained   lighting adjusted   nonskid shoes/slippers when out of bed   safety round/check completed  Taken 1/19/2024 2200 by Dedra Vazquez RN  Safety Promotion/Fall Prevention:   assistive device/personal items within reach   clutter free environment maintained   fall prevention program maintained   lighting adjusted   nonskid shoes/slippers when out of bed   safety round/check completed  Taken 1/19/2024 2100 by Dedra Vazquez RN  Safety Promotion/Fall Prevention:   assistive device/personal items within reach   clutter free environment maintained   fall prevention program maintained   lighting adjusted   nonskid shoes/slippers when out of bed   safety round/check completed  Taken 1/19/2024 2000 by Dedra Vazquez RN  Safety Promotion/Fall Prevention:   assistive device/personal items within reach   clutter free environment maintained   fall prevention program maintained   lighting adjusted   nonskid shoes/slippers when out of bed   safety round/check completed  Intervention: Prevent Skin Injury  Recent Flowsheet Documentation  Taken 1/20/2024 0600 by Dedra Vazquez RN  Body Position: position changed independently  Taken 1/20/2024 0400 by Dedra Vazquez RN  Body Position: position changed independently  Taken 1/20/2024 0200 by Dedra Vazquez RN  Body Position: position changed independently  Taken  1/20/2024 0000 by Dedra Vazquez RN  Body Position: position changed independently  Taken 1/19/2024 2200 by Dedra Vazquez RN  Body Position: position changed independently  Taken 1/19/2024 2000 by Dedra Vazquez RN  Body Position: position changed independently  Intervention: Prevent and Manage VTE (Venous Thromboembolism) Risk  Recent Flowsheet Documentation  Taken 1/20/2024 0600 by Dedra Vazquez RN  Activity Management: up ad areli  Taken 1/20/2024 0400 by Dedra Vazquez RN  Activity Management: up ad areli  Taken 1/20/2024 0000 by Dedra Vazquez RN  Activity Management: up ad areli  Taken 1/19/2024 2000 by Dedra Vazquez RN  Activity Management: up ad areli  VTE Prevention/Management:   bilateral   sequential compression devices on  Range of Motion: active ROM (range of motion) encouraged  Intervention: Prevent Infection  Recent Flowsheet Documentation  Taken 1/20/2024 0600 by Dedra Vazquez RN  Infection Prevention:   environmental surveillance performed   hand hygiene promoted   personal protective equipment utilized   single patient room provided   rest/sleep promoted  Taken 1/20/2024 0500 by Dedra Vazquez RN  Infection Prevention:   environmental surveillance performed   hand hygiene promoted   personal protective equipment utilized   single patient room provided   rest/sleep promoted  Taken 1/20/2024 0400 by Dedra Vazquez RN  Infection Prevention:   environmental surveillance performed   hand hygiene promoted   personal protective equipment utilized   single patient room provided   rest/sleep promoted  Taken 1/20/2024 0300 by Dedra Vazquez RN  Infection Prevention:   environmental surveillance performed   hand hygiene promoted   personal protective equipment utilized   single patient room provided   rest/sleep promoted  Taken 1/20/2024 0200 by Dedra Vazquez RN  Infection Prevention:   environmental surveillance performed   hand hygiene promoted   personal protective equipment utilized   single patient room  provided   rest/sleep promoted  Taken 1/20/2024 0100 by Dedra Vazquez RN  Infection Prevention:   environmental surveillance performed   hand hygiene promoted   personal protective equipment utilized   single patient room provided   rest/sleep promoted  Taken 1/20/2024 0000 by Dedra Vazquez RN  Infection Prevention:   environmental surveillance performed   hand hygiene promoted   personal protective equipment utilized   single patient room provided   rest/sleep promoted  Taken 1/19/2024 2300 by Dedra Vazquez RN  Infection Prevention:   environmental surveillance performed   hand hygiene promoted   personal protective equipment utilized   single patient room provided   rest/sleep promoted  Taken 1/19/2024 2200 by Dedra Vazquez RN  Infection Prevention:   environmental surveillance performed   hand hygiene promoted   personal protective equipment utilized   single patient room provided   rest/sleep promoted  Taken 1/19/2024 2100 by Dedra Vazquez RN  Infection Prevention:   environmental surveillance performed   hand hygiene promoted   personal protective equipment utilized   single patient room provided   rest/sleep promoted  Taken 1/19/2024 2000 by Dedra Vazquez RN  Infection Prevention:   environmental surveillance performed   equipment surfaces disinfected   hand hygiene promoted   personal protective equipment utilized   rest/sleep promoted   single patient room provided  Goal: Optimal Comfort and Wellbeing  Outcome: Ongoing, Progressing  Intervention: Provide Person-Centered Care  Recent Flowsheet Documentation  Taken 1/19/2024 2000 by Dedra Vazquez RN  Trust Relationship/Rapport:   care explained   reassurance provided  Goal: Readiness for Transition of Care  Outcome: Ongoing, Progressing     Problem: Adjustment to Illness (Stroke, Hemorrhagic)  Goal: Optimal Coping  Outcome: Ongoing, Progressing  Intervention: Support Psychosocial Response to Stroke  Recent Flowsheet Documentation  Taken 1/19/2024 2000  by George, Dedra, RN  Family/Support System Care: support provided     Problem: Bowel Elimination Impaired (Stroke, Hemorrhagic)  Goal: Effective Bowel Elimination  Outcome: Ongoing, Progressing     Problem: Cerebral Tissue Perfusion (Stroke, Hemorrhagic)  Goal: Optimal Cerebral Tissue Perfusion  Outcome: Ongoing, Progressing     Problem: Cognitive Impairment (Stroke, Hemorrhagic)  Goal: Optimal Cognitive Function  Outcome: Ongoing, Progressing     Problem: Communication Impairment (Stroke, Hemorrhagic)  Goal: Effective Communication Skills  Outcome: Ongoing, Progressing     Problem: Functional Ability Impaired (Stroke, Hemorrhagic)  Goal: Optimal Functional Ability  Outcome: Ongoing, Progressing  Intervention: Optimize Functional Ability  Recent Flowsheet Documentation  Taken 1/20/2024 0600 by Dedra Vazquez RN  Activity Management: up ad areli  Taken 1/20/2024 0400 by Dedra Vazquez RN  Activity Management: up ad areli  Taken 1/20/2024 0000 by Dedra Vazquez RN  Activity Management: up ad areli  Taken 1/19/2024 2000 by Dedra Vazquez RN  Activity Management: up ad areli     Problem: Pain (Stroke, Hemorrhagic)  Goal: Acceptable Pain Control  Outcome: Ongoing, Progressing     Problem: Respiratory Compromise (Stroke, Hemorrhagic)  Goal: Effective Oxygenation and Ventilation  Outcome: Ongoing, Progressing  Intervention: Optimize Oxygenation and Ventilation  Recent Flowsheet Documentation  Taken 1/20/2024 0600 by Dedra Vazquez RN  Head of Bed (HOB) Positioning: HOB at 30 degrees  Taken 1/20/2024 0400 by Dedra Vazquez RN  Head of Bed (HOB) Positioning: HOB at 30 degrees  Taken 1/20/2024 0200 by Dedra Vazquez RN  Head of Bed (HOB) Positioning: HOB at 30 degrees  Taken 1/20/2024 0000 by Dedra Vazquez RN  Head of Bed (HOB) Positioning: HOB at 30 degrees  Taken 1/19/2024 2200 by Dedra Vazquez RN  Head of Bed (HOB) Positioning: HOB at 30 degrees  Taken 1/19/2024 2000 by Dedra Vazquez RN  Head of Bed (HOB) Positioning: HOB at  30 degrees     Problem: Sensorimotor Impairment (Stroke, Hemorrhagic)  Goal: Improved Sensorimotor Function  Outcome: Ongoing, Progressing  Intervention: Optimize Range of Motion, Motor Control and Function  Recent Flowsheet Documentation  Taken 1/20/2024 0600 by Dedra Vazquez RN  Positioning/Transfer Devices: pillows  Taken 1/20/2024 0400 by Dedra Vazquez RN  Positioning/Transfer Devices: pillows  Taken 1/20/2024 0200 by Dedra Vazquez RN  Positioning/Transfer Devices: pillows  Taken 1/20/2024 0000 by Dedra Vazquez RN  Positioning/Transfer Devices: pillows  Taken 1/19/2024 2200 by Dedra Vazquez RN  Positioning/Transfer Devices: pillows  Taken 1/19/2024 2000 by Dedra Vazquez RN  Positioning/Transfer Devices: pillows  Range of Motion: active ROM (range of motion) encouraged     Problem: Swallowing Impairment (Stroke, Hemorrhagic)  Goal: Optimal Eating and Swallowing Without Aspiration  Outcome: Ongoing, Progressing     Problem: Urinary Elimination Impaired (Stroke, Hemorrhagic)  Goal: Effective Urinary Elimination  Outcome: Ongoing, Progressing     Problem: Pain Acute  Goal: Acceptable Pain Control and Functional Ability  Outcome: Ongoing, Progressing  Intervention: Prevent or Manage Pain  Recent Flowsheet Documentation  Taken 1/20/2024 0600 by Dedra Vazquez RN  Medication Review/Management: medications reviewed  Taken 1/20/2024 0500 by Dedra Vazquez RN  Medication Review/Management: medications reviewed  Taken 1/20/2024 0400 by Dedra Vazquez RN  Medication Review/Management: medications reviewed  Taken 1/20/2024 0300 by Dedra Vazquez RN  Medication Review/Management: medications reviewed  Taken 1/20/2024 0200 by Dedra Vazquez RN  Medication Review/Management: medications reviewed  Taken 1/20/2024 0100 by Dedra Vazquez RN  Medication Review/Management: medications reviewed  Taken 1/20/2024 0000 by Dedra Vazquez RN  Medication Review/Management: medications reviewed  Taken 1/19/2024 2300 by Dedra Vazquez  RN  Medication Review/Management: medications reviewed  Taken 1/19/2024 2200 by Dedra Vazquez RN  Medication Review/Management: medications reviewed  Taken 1/19/2024 2100 by Dedra Vazquez RN  Medication Review/Management: medications reviewed  Intervention: Optimize Psychosocial Wellbeing  Recent Flowsheet Documentation  Taken 1/19/2024 2000 by Dedra Vazquez RN  Diversional Activities:   smartphone   television     Problem: Skin Injury Risk Increased  Goal: Skin Health and Integrity  Outcome: Ongoing, Progressing  Intervention: Optimize Skin Protection  Recent Flowsheet Documentation  Taken 1/20/2024 0600 by Dedra Vazquez RN  Head of Bed (HOB) Positioning: HOB at 30 degrees  Taken 1/20/2024 0400 by Dedra Vazquez RN  Head of Bed (HOB) Positioning: HOB at 30 degrees  Taken 1/20/2024 0200 by Dedra Vazquez RN  Head of Bed (HOB) Positioning: HOB at 30 degrees  Taken 1/20/2024 0000 by Dedra Vazquez RN  Head of Bed (HOB) Positioning: HOB at 30 degrees  Taken 1/19/2024 2200 by Dedra Vazquez RN  Head of Bed (HOB) Positioning: HOB at 30 degrees  Taken 1/19/2024 2000 by Dedra Vazquez RN  Head of Bed (HOB) Positioning: HOB at 30 degrees   Goal Outcome Evaluation:

## 2024-01-20 NOTE — CONSULTS
"Neurology Consult Note    Consult Date: 1/20/2024    Referring MD: Chris Levine MD    Reason for Consult I have been asked to see the patient in neurological consultation to render advice and opinion regarding vasospasm    Binta Morales is a 58 y.o. female with no previous medical history who presented to the hospital on 1/16 with sudden onset of acute headache and nausea and vomiting.  She then fainted and struck the right orbit against a sink on the way down to the ground.  The onset was Friday 1/12 and she spent the weekend at home feeling extremely ill with severe headache.  She came to the hospital and was found to have aneurysmal subarachnoid hemorrhage.  She had a ruptured anterior communicating artery aneurysm.  This was embolized by Dr. Casillas with a web device.    Postoperatively she has done pretty well.  She currently denies headache but does endorse generalized weakness and fatigue.  She has intermittently been febrile overnight.  Blood pressure was a little low yesterday evening as low as 100 systolic.  Today her daily TCD showed evidence of severe vasospasm.  I was consulted regarding whether there were any changes on neurologic exam.    Past Medical History:   Diagnosis Date    Hypertension        Exam  /84   Pulse 100   Temp 99.1 °F (37.3 °C) (Oral)   Resp 15   Ht 165.1 cm (65\")   Wt 65.7 kg (144 lb 13.5 oz)   SpO2 93%   BMI 24.10 kg/m²   Gen: NAD, vitals reviewed  MS: oriented x3, recent/remote memory intact, normal attention/concentration, language intact, no neglect.  CN: visual acuity grossly normal, PERRL, EOMI, no facial droop, no dysarthria  Motor: 5/5 throughout upper extremities, 4+/5 bilateral lower extremities, normal tone throughout  Sensory: Intact to cold temperature throughout    DATA:    Lab Results   Component Value Date    GLUCOSE 103 (H) 01/20/2024    CALCIUM 9.1 01/20/2024     01/20/2024    K 3.7 01/20/2024    CO2 22.0 01/20/2024     01/20/2024    BUN " 13 01/20/2024    CREATININE 0.57 01/20/2024    EGFRIFAFRI >60 02/09/2023    BCR 22.8 01/20/2024    ANIONGAP 15.0 01/20/2024     Lab Results   Component Value Date    WBC 10.32 01/20/2024    HGB 13.2 01/20/2024    HCT 38.3 01/20/2024    MCV 84.2 01/20/2024     01/20/2024       Lab review: CBC, BMP unremarkable    Imaging review:  I personally reviewed her CT head performed today which shows stable hydrocephalus, resolving subarachnoid hemorrhage.  No evidence of rebleeding    Diagnoses:  Vasospasm, asymptomatic  Aneurysmal subarachnoid hemorrhage  Anterior communicating artery aneurysm status post repair  Fever, likely central    Comment: 58-year-old recovering from aneurysmal SAH status post web embolization of an ACOM aneurysm.  She is doing well clinically with no new symptoms.  She does have severe vasospasm identified on TCD but no clinical evidence of delayed cerebral ischemia.    PLAN:  Continue close monitoring in the ICU  Continue daily TCD's  Blood pressure goal 120-160. IV fluids. Ok to use pressor if needed to maintain target range  Continue Nimodipine 60 q4  Keppra 500 q12    Management discussed with Dr. Gamble

## 2024-01-20 NOTE — NURSING NOTE
Dr Paulson called per request of Dr Downing.  Dr Downing spoke with Dr Casillas and discussed transcranial doppler report indicating severe vaso spasm.  New orders received from Dr Casillas.

## 2024-01-20 NOTE — THERAPY TREATMENT NOTE
Patient Name: Binta Morales  : 1965    MRN: [id number]                              Today's Date: 2024       Admit Date: 2024    Visit Dx:     ICD-10-CM ICD-9-CM   1. SAH (subarachnoid hemorrhage)  I60.9 430     Patient Active Problem List   Diagnosis    SAH (subarachnoid hemorrhage)    Ruptured aneurysm of anterior communicating artery    Communicating hydrocephalus     Past Medical History:   Diagnosis Date    Hypertension      Past Surgical History:   Procedure Laterality Date    EMBOLIZATION CEREBRAL N/A 2024    Procedure: CEREBRAL ANGIOGRAM WITH EMBOLIZATION;  Surgeon: Angelito Gamble MD;  Location: [facility] ;  Service: Interventional Radiology;  Laterality: N/A;      General Information       Row Name 24 1505          Physical Therapy Time and Intention    Document Type therapy note (daily note)  -     Mode of Treatment physical therapy;individual therapy  -       Row Name 24 1505          General Information    Patient Profile Reviewed yes  -ST     Existing Precautions/Restrictions fall  -160 per neuro note  -       Row Name 24 1505          Cognition    Orientation Status (Cognition) oriented x 4  -ST       Row Name 24 1505          Safety Issues, Functional Mobility    Impairments Affecting Function (Mobility) endurance/activity tolerance;strength;pain;balance  -ST     Comment, Safety Issues/Impairments (Mobility) nonskid socks donned  -ST               User Key  (r) = Recorded By, (t) = Taken By, (c) = Cosigned By      Initials Name Provider Type    ST Shannon Tolliver PT Physical Therapist                   Mobility       Row Name 24 1505          Bed Mobility    Bed Mobility supine-sit  -ST     Supine-Sit [place] (Bed Mobility) standby assist  -ST     Assistive Device (Bed Mobility) bed rails  -ST       Row Name 24 1505          Bed-Chair Transfer    Bed-Chair [place] (Transfers) contact guard  -        Row Name 01/20/24 1505          Sit-Stand Transfer    Sit-Stand [place] (Transfers) contact guard  -ST       Row Name 01/20/24 1505          Gait/Stairs (Locomotion)    Comment, (Gait/Stairs) ambulation deferred d/t fatigue and noted severe vasospasm today  -ST               User Key  (r) = Recorded By, (t) = Taken By, (c) = Cosigned By      Initials Name Provider Type    Shannon Laboy, ANSLEY Physical Therapist                   Obj/Interventions       Row Name 01/20/24 1506          Motor Skills    Therapeutic Exercise --  sitting LAQ and hip marches 2 x 10 bilat  -ST       Row Name 01/20/24 1506          Balance    Comment, Balance facilitated dynamic balance with side stepping x 3 each direction, 2 trials. significant LOB to L needing mod A and sitting back on bed to regain balance. unsteadiness noted with all mobility. educated on safety  -ST               User Key  (r) = Recorded By, (t) = Taken By, (c) = Cosigned By      Initials Name Provider Type    Shannon Laboy, ANSLEY Physical Therapist                   Goals/Plan    No documentation.                  Clinical Impression       [facility] Name 01/20/24 1507          Pain    Pretreatment Pain Rating 0/10 - no pain  -ST     Posttreatment Pain Rating 0/10 - no pain  -ST       [facility] Name 01/20/24 1507          Plan of Care Review    Plan of Care Reviewed With patient  -     Progress no change  -ST     Outcome Evaluation Pt seen for PT this PM, activity modified d/t vasospasming, per RN. pt CGA for bed to chair transfers. tolerated seated exercises and trial balance activities with side stepping. Pt noted to have significant LOB with this task initially but improved with repetition. Pt returned supine in bed at end of session. Pt continues to benefit from skilled PT to progress mobility.  -ST       Row Name 01/20/24 1507          Therapy Assessment/Plan (PT)    Criteria for Skilled Interventions Met (PT) yes;meets criteria  -ST     Therapy Frequency  (PT) 6 times/wk  -ST       Row Name 01/20/24 1507          Positioning and Restraints    Pre-Treatment Position in bed  -ST     Post Treatment Position bed  -ST     In Bed supine;notified nsg;call light within reach;encouraged to call for assist;exit alarm on  -ST               User Key  (r) = Recorded By, (t) = Taken By, (c) = Cosigned By      Initials Name Provider Type    Shannon Laboy PT Physical Therapist                   Outcome Measures       Row Name 01/20/24 1513 01/20/24 0800       How much help from another person do you currently need...    Turning from your back to your side while in flat bed without using bedrails? 4  -ST 4  -BB    Moving from lying on back to sitting on the side of a flat bed without bedrails? 3  -ST 4  -BB    Moving to and from a bed to a chair (including a wheelchair)? 3  -ST 4  -BB    Standing up from a chair using your arms (e.g., wheelchair, bedside chair)? 3  -ST 4  -BB    Climbing 3-5 steps with a railing? 2  -ST 3  -BB    To walk in hospital room? 3  -ST 3  -BB    AM-PAC 6 Clicks Score (PT) 18  -ST 22  -BB    Highest Level of Mobility Goal 6 --> Walk 10 steps or more  -ST 7 --> Walk 25 feet or more  -BB      Row Name 01/20/24 1513          Functional Assessment    Outcome Measure Options AM-PAC 6 Clicks Basic Mobility (PT)  -ST               User Key  (r) = Recorded By, (t) = Taken By, (c) = Cosigned By      Initials Name Provider Type    Nikia Fernando RN Registered Nurse    Shannon Laboy PT Physical Therapist                                 Physical Therapy Education       Title: PT OT SLP Therapies (In Progress)       Topic: Physical Therapy (In Progress)       Point: Mobility training (In Progress)       Learning Progress Summary             Patient Acceptance, TB,E, NR by ST at 1/20/2024 1513    Acceptance, E, VU by ER at 1/19/2024 1128    Acceptance, E,TB, VU by MS at 1/18/2024 1150                         Point: Home exercise program (Done)        Learning Progress Summary             Patient Acceptance, E, VU by ER at 1/19/2024 1128    Acceptance, E,TB, VU by MS at 1/18/2024 1150                         Point: Body mechanics (Done)       Learning Progress Summary             Patient Acceptance, E, VU by ER at 1/19/2024 1128    Acceptance, E,TB, VU by MS at 1/18/2024 1150                         Point: Precautions (In Progress)       Learning Progress Summary             Patient Acceptance, TB,E, NR by ST at 1/20/2024 1513    Acceptance, E, VU by ER at 1/19/2024 1128    Acceptance, E,TB, VU by MS at 1/18/2024 1150                                         User Key       Initials Effective Dates Name Provider Type Discipline    MS 06/16/21 -  Juana Kauffman, PT Physical Therapist PT    ST 09/22/22 -  Shannon Tolliver, PT Physical Therapist PT    ER 10/15/23 -  Bela Forte, PT Physical Therapist PT                  PT Recommendation and Plan     Plan of Care Reviewed With: patient  Progress: no change  Outcome Evaluation: Pt seen for PT this PM, activity modified d/t vasospasming, per RN. pt CGA for bed to chair transfers. tolerated seated exercises and trial balance activities with side stepping. Pt noted to have significant LOB with this task initially but improved with repetition. Pt returned supine in bed at end of session. Pt continues to benefit from skilled PT to progress mobility.     Time Calculation:         PT Charges       Row Name 01/20/24 1513             Time Calculation    Start Time 1411  -ST      Stop Time 1426  -ST      Time Calculation (min) 15 min  -ST      PT Received On 01/20/24  -ST      PT - Next Appointment 01/22/24  -ST         Time Calculation- PT    Total Timed Code Minutes- PT 15 minute(s)  -ST         Timed Charges    15511 - PT Therapeutic Exercise Minutes 5  -ST      45935 - PT Therapeutic Activity Minutes 10  -ST         Total Minutes    Timed Charges Total Minutes 15  -ST       Total Minutes 15  -ST                 User Key  (r) = Recorded By, (t) = Taken By, (c) = Cosigned By      Initials Name Provider Type     Shannon Tolliver, ANSLEY Physical Therapist                  Therapy Charges for Today       Code Description Service Date Service Provider Modifiers Qty    [id number]  PT THERAPEUTIC ACT EA 15 MIN 1/20/2024 Shannon Tolliver PT GP 1            PT G-Codes  Outcome Measure Options: AM-PAC 6 Clicks Basic Mobility (PT)  AM-PAC 6 Clicks Score (PT): 18  AM-PAC 6 Clicks Score (OT): 21  Modified Gazelle Scale: 2 - Slight disability.  Unable to carry out all previous activities but able to look after own affairs without assistance.  PT Discharge Summary  Anticipated Discharge Disposition (PT): home with home health, inpatient rehabilitation facility    Shannon Tolliver PT  1/20/2024

## 2024-01-20 NOTE — PLAN OF CARE
Goal Outcome Evaluation:  Plan of Care Reviewed With: patient        Progress: no change  Outcome Evaluation: Pt seen for PT this PM, activity modified d/t vasospasming, per RN. pt CGA for bed to chair transfers. tolerated seated exercises and trial balance activities with side stepping. Pt noted to have significant LOB with this task initially but improved with repetition. Pt returned supine in bed at end of session. Pt continues to benefit from skilled PT to progress mobility.      Anticipated Discharge Disposition (PT): home with home health, inpatient rehabilitation facility

## 2024-01-20 NOTE — PROGRESS NOTES
[facility] INPATIENT PROGRESS NOTE         [facility] INTENSIVE CARE    2024      PATIENT IDENTIFICATION:  Name: Binta Morales ADMIT: 2024   : 1965  PCP: Alma Delia Rudolph PA-C    MRN: [id number] LOS: 4 days   AGE/SEX: 58 y.o. female  ROOM: [facility]                     LOS 4    Reason for visit: Subarachnoid hemorrhage due to ACOM aneurysm      SUBJECTIVE:      No new issues overnight.  Discussed with family at bedside.  Discussed CT results with him in detail and questions answered to their satisfaction. I am seeing the patient for the first time today.  All patient problems are new to me.      Objective   OBJECTIVE:    Vital Sign Min/Max for last 24 hours  Temp  Min: 97.8 °F (36.6 °C)  Max: 101.5 °F (38.6 °C)   BP  Min: 102/46  Max: 156/88   Pulse  Min: 61  Max: 100   No data recorded   SpO2  Min: 92 %  Max: 99 %   No data recorded   No data recorded    Vitals:    24 0900 24 1000 24 1100 24 1212   BP: 120/61 136/72 131/76 139/84   Pulse: 80 85 90 100   Resp:       Temp:       TempSrc:       SpO2: 97% 96% 93%    Weight:       Height:                24  0545 24  0500   Weight: 68.9 kg (151 lb 14.4 oz) 65.7 kg (144 lb 13.5 oz)       Body mass index is 24.1 kg/m².                          Body mass index is 24.1 kg/m².  No intake or output data in the 24 hours ending 24 1221      Exam:  GEN:  No distress, appears stated age  EYES:   PERRL, anicteric sclerae  ENT:    External ears/nose normal, OP clear  NECK:  No adenopathy, midline trachea  LUNGS: Normal chest on inspection, palpation and auscultation  CV:  Normal S1S2, without murmur  ABD:  Nontender, nondistended, no hepatosplenomegaly, +BS  EXT:  No edema.  No cyanosis or clubbing.  No mottling and normal cap refill.    Assessment     Scheduled meds:  amoxicillin-clavulanate, 1 tablet, Oral, Q12H  aspirin, 81 mg, Oral, Daily  atorvastatin, 20 mg, Oral, Nightly  levETIRAcetam, 500 mg, Intravenous,  Q12H  niMODipine, 60 mg, Oral, Q4H  senna-docusate sodium, 2 tablet, Oral, BID  sodium chloride, 10 mL, Intravenous, Q12H      IV meds:                      hold, 1 each  sodium chloride, 100 mL/hr, Last Rate: 100 mL/hr (01/20/24 1219)      Data Review:  Results from last 7 days   Lab Units 01/20/24  0450 01/19/24  0417 01/18/24  0527 01/17/24  0611 01/16/24  1138   SODIUM mmol/L 141 137 140 136 138   POTASSIUM mmol/L 3.7 4.0 4.2 2.7* 3.4*   CHLORIDE mmol/L 104 103 106 102 98   CO2 mmol/L 22.0 20.4* 22.0 20.0* 22.0   BUN mg/dL 13 15 15 13 20   CREATININE mg/dL 0.57 0.51* 0.76 0.63 0.87   GLUCOSE mg/dL 103* 119* 124* 122* 112*   CALCIUM mg/dL 9.1 9.3 9.9 8.8 10.3         Estimated Creatinine Clearance: 110.2 mL/min (by C-G formula based on SCr of 0.57 mg/dL).  Results from last 7 days   Lab Units 01/20/24  0450 01/19/24  0417 01/18/24  0527 01/17/24  0611 01/16/24  1138   WBC 10*3/mm3 10.32 11.98* 14.91* 11.33* 12.30*   HEMOGLOBIN g/dL 13.2 12.5 13.7 12.9 15.7   PLATELETS 10*3/mm3 338 295 317 164 373     Results from last 7 days   Lab Units 01/16/24  1253   INR  1.09     Results from last 7 days   Lab Units 01/16/24  1138   ALT (SGPT) U/L 39*   AST (SGOT) U/L 17         Results from last 7 days   Lab Units 01/17/24  2124   PROCALCITONIN ng/mL 0.22   LACTATE mmol/L 1.0         Hemoglobin A1C   Date/Time Value Ref Range Status   01/18/2024 0527 5.80 (H) 4.80 - 5.60 % Final     Glucose   Date/Time Value Ref Range Status   01/18/2024 0022 122 70 - 130 mg/dL Final         Imaging reviewed  Repeat CT head 1/20 reviewed: Slightly less conspicuous small amount of bilateral subarachnoid hemorrhage.    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         ASSESSMENT:  ACOM  aneurysm status post rupture and repair  Subarachnoid hemorrhage secondary to above  Hypoxemia mainly while asleep with sats of 88% on room air while asleep  Hypertension by history  Aspiration pneumonia  E. coli UTI      PLAN:  Neurochecks per protocol.  Dopplers to evaluate vasospasm per neurosurgery recommendations.  Nimotop ordered.  Continue antibiotic for aspiration pneumonia/UTI coverage.  Keppra for seizure prophylaxis.  Control glucose.    Control blood pressure.  Mechanical DVT prophylaxis.      CCT: 33 min    Joshua Wilson MD  Pulmonary and Critical Care Medicine  [facility]  1/20/2024    12:21 EST

## 2024-01-21 LAB
ANION GAP SERPL CALCULATED.3IONS-SCNC: 12 MMOL/L (ref 5–15)
BUN SERPL-MCNC: 12 MG/DL (ref 6–20)
BUN/CREAT SERPL: 20 (ref 7–25)
CALCIUM SPEC-SCNC: 9.3 MG/DL (ref 8.6–10.5)
CHLORIDE SERPL-SCNC: 101 MMOL/L (ref 98–107)
CO2 SERPL-SCNC: 21 MMOL/L (ref 22–29)
CREAT SERPL-MCNC: 0.6 MG/DL (ref 0.57–1)
DEPRECATED RDW RBC AUTO: 38 FL (ref 37–54)
EGFRCR SERPLBLD CKD-EPI 2021: 103.5 ML/MIN/1.73
ERYTHROCYTE [DISTWIDTH] IN BLOOD BY AUTOMATED COUNT: 12.6 % (ref 12.3–15.4)
GLUCOSE SERPL-MCNC: 117 MG/DL (ref 65–99)
HCT VFR BLD AUTO: 37.6 % (ref 34–46.6)
HGB BLD-MCNC: 12.9 G/DL (ref 12–15.9)
MCH RBC QN AUTO: 29.1 PG (ref 26.6–33)
MCHC RBC AUTO-ENTMCNC: 34.3 G/DL (ref 31.5–35.7)
MCV RBC AUTO: 84.9 FL (ref 79–97)
PLATELET # BLD AUTO: 477 10*3/MM3 (ref 140–450)
PMV BLD AUTO: 9.2 FL (ref 6–12)
POTASSIUM SERPL-SCNC: 3.9 MMOL/L (ref 3.5–5.2)
RBC # BLD AUTO: 4.43 10*6/MM3 (ref 3.77–5.28)
SODIUM SERPL-SCNC: 134 MMOL/L (ref 136–145)
WBC NRBC COR # BLD AUTO: 13.98 10*3/MM3 (ref 3.4–10.8)

## 2024-01-21 PROCEDURE — C1751 CATH, INF, PER/CENT/MIDLINE: HCPCS

## 2024-01-21 PROCEDURE — 85027 COMPLETE CBC AUTOMATED: CPT | Performed by: INTERNAL MEDICINE

## 2024-01-21 PROCEDURE — 25810000003 SODIUM CHLORIDE 0.9 % SOLUTION

## 2024-01-21 PROCEDURE — 80048 BASIC METABOLIC PNL TOTAL CA: CPT | Performed by: INTERNAL MEDICINE

## 2024-01-21 PROCEDURE — 05HY33Z INSERTION OF INFUSION DEVICE INTO UPPER VEIN, PERCUTANEOUS APPROACH: ICD-10-PCS | Performed by: RADIOLOGY

## 2024-01-21 PROCEDURE — 99232 SBSQ HOSP IP/OBS MODERATE 35: CPT | Performed by: PSYCHIATRY & NEUROLOGY

## 2024-01-21 PROCEDURE — 25010000002 PHENYLEPHRINE 10 MG/ML SOLUTION

## 2024-01-21 PROCEDURE — 25010000002 LEVETRIRACETAM PER 10 MG: Performed by: INTERNAL MEDICINE

## 2024-01-21 RX ORDER — SODIUM CHLORIDE 0.9 % (FLUSH) 0.9 %
20 SYRINGE (ML) INJECTION AS NEEDED
Status: DISCONTINUED | OUTPATIENT
Start: 2024-01-21 | End: 2024-01-26 | Stop reason: HOSPADM

## 2024-01-21 RX ORDER — SODIUM CHLORIDE 0.9 % (FLUSH) 0.9 %
10 SYRINGE (ML) INJECTION EVERY 12 HOURS SCHEDULED
Status: DISCONTINUED | OUTPATIENT
Start: 2024-01-21 | End: 2024-01-26 | Stop reason: HOSPADM

## 2024-01-21 RX ORDER — SODIUM CHLORIDE 0.9 % (FLUSH) 0.9 %
10 SYRINGE (ML) INJECTION AS NEEDED
Status: DISCONTINUED | OUTPATIENT
Start: 2024-01-21 | End: 2024-01-26 | Stop reason: HOSPADM

## 2024-01-21 RX ADMIN — Medication 10 ML: at 20:03

## 2024-01-21 RX ADMIN — SODIUM CHLORIDE 100 ML/HR: 4.5 INJECTION, SOLUTION INTRAVENOUS at 14:51

## 2024-01-21 RX ADMIN — ATORVASTATIN CALCIUM 20 MG: 20 TABLET, FILM COATED ORAL at 20:03

## 2024-01-21 RX ADMIN — Medication 10 ML: at 14:46

## 2024-01-21 RX ADMIN — ACETAMINOPHEN 650 MG: 325 TABLET, FILM COATED ORAL at 08:44

## 2024-01-21 RX ADMIN — LEVETIRACETAM 500 MG: 500 INJECTION, SOLUTION INTRAVENOUS at 20:56

## 2024-01-21 RX ADMIN — SODIUM CHLORIDE 100 ML/HR: 4.5 INJECTION, SOLUTION INTRAVENOUS at 07:31

## 2024-01-21 RX ADMIN — NIMODIPINE 60 MG: 30 CAPSULE, LIQUID FILLED ORAL at 08:12

## 2024-01-21 RX ADMIN — NIMODIPINE 60 MG: 30 CAPSULE, LIQUID FILLED ORAL at 23:28

## 2024-01-21 RX ADMIN — NIMODIPINE 60 MG: 30 CAPSULE, LIQUID FILLED ORAL at 12:27

## 2024-01-21 RX ADMIN — NIMODIPINE 60 MG: 30 CAPSULE, LIQUID FILLED ORAL at 16:12

## 2024-01-21 RX ADMIN — Medication 10 ML: at 08:06

## 2024-01-21 RX ADMIN — NIMODIPINE 60 MG: 30 CAPSULE, LIQUID FILLED ORAL at 04:35

## 2024-01-21 RX ADMIN — LEVETIRACETAM 500 MG: 500 INJECTION, SOLUTION INTRAVENOUS at 08:45

## 2024-01-21 RX ADMIN — PHENYLEPHRINE HYDROCHLORIDE 1 MCG/KG/MIN: 50 INJECTION INTRAVENOUS at 14:45

## 2024-01-21 RX ADMIN — NIMODIPINE 60 MG: 30 CAPSULE, LIQUID FILLED ORAL at 19:53

## 2024-01-21 RX ADMIN — ACETAMINOPHEN 650 MG: 325 TABLET, FILM COATED ORAL at 21:10

## 2024-01-21 RX ADMIN — AMOXICILLIN AND CLAVULANATE POTASSIUM 1 TABLET: 875; 125 TABLET, FILM COATED ORAL at 08:44

## 2024-01-21 RX ADMIN — ASPIRIN 81 MG: 81 TABLET, COATED ORAL at 08:44

## 2024-01-21 NOTE — SIGNIFICANT NOTE
"   01/21/24 1358   PICC Double Lumen 01/21/24 Right Basilic   Placement date: If unknown, DO NOT use \"Add Comment\" note/Placement time: If unknown, DO NOT use \"Add Comment\" note: 01/21/24 1357   Hand Hygiene Completed: Yes  Size (Fr): 4  Length (cm): 38 cm  Orientation: Right  Location: Basilic  Site Prep: Chlor...   Site Assessment Clean;Dry;Intact   #1 Lumen Status Blood return noted;Capped;Normal saline locked;Flushed   #2 Lumen Status Blood return noted;Capped;Flushed;Normal saline locked   Length neno (cm) 38 cm   Extremity Circumference (cm) 29 cm   Dressing Type Border Dressing;Transparent;Securing device;Antimicrobial dressing/disc   Dressing Status Clean;Dry;Intact   Dressing Intervention New dressing   Dressing Change Due 01/28/24   Indication/Daily Review of Necessity long-term IV access >7 days;multiple infusates     3 needles, 2 wires, and 1 scalpel accounted for and disposed of properly.    Lot #: [id number]  EXP DATE: 11/30/2024      "

## 2024-01-21 NOTE — PROGRESS NOTES
"DOS: 2024  NAME: Binta Morales   : 1965  PCP: Alma Delia Rudolph PA-C  Chief Complaint   Patient presents with    Syncope    Head Injury       Chief complaint: subarachnoid hemorrhage  Subjective: no significant neurologic change    Objective:  Vital signs: /70   Pulse 75   Temp 99.2 °F (37.3 °C) (Oral)   Resp 15   Ht 165.1 cm (65\")   Wt 65.7 kg (144 lb 13.5 oz)   SpO2 94%   BMI 24.10 kg/m²    Gen: NAD, vitals reviewed  MS: oriented x3, recent/remote memory intact, normal attention/concentration, language intact, no neglect.  CN: visual acuity grossly normal, PERRL, EOMI, no facial droop, no dysarthria  Motor: 5/5 throughout upper and lower extremities, normal tone  Sensory: intact to light touch all 4 ext.    Laboratory results:  Lab Results   Component Value Date    GLUCOSE 117 (H) 2024    CALCIUM 9.3 2024     (L) 2024    K 3.9 2024    CO2 21.0 (L) 2024     2024    BUN 12 2024    CREATININE 0.60 2024    EGFRIFAFRI >60 2023    BCR 20.0 2024    ANIONGAP 12.0 2024     Lab Results   Component Value Date    WBC 13.98 (H) 2024    HGB 12.9 2024    HCT 37.6 2024    MCV 84.9 2024     (H) 2024     Lab Results   Component Value Date    LDL 80 2024     (H) 2020    LDL 89 2018         Lab 24  0527   HEMOGLOBIN A1C 5.80*        Review of labs: Sodium 134, WBC 14, platelets 477    Diagnoses:  Vasospasm, asymptomatic  Aneurysmal subarachnoid hemorrhage  Anterior communicating artery aneurysm status post repair    Comment: Vasospasm remains asymptomatic    Plan:  1.  Continue neurochecks  2.  Daily TCD's  3.  BP goal 120-160, IV fluids  4.  Nimodipine 60 every 4  5.  Keppra 500 every 12    Management discussed with nursing staff, Dr. Wilson and Dr. Gamble. Will plan to defer further care to neurosurgery after the weekend.    "

## 2024-01-21 NOTE — PLAN OF CARE
Problem: Adult Inpatient Plan of Care  Goal: Plan of Care Review  1/21/2024 0733 by Dedra Vazquez RN  Outcome: Ongoing, Progressing  1/21/2024 0733 by Dedra Vazquez RN  Outcome: Ongoing, Progressing  Goal: Patient-Specific Goal (Individualized)  1/21/2024 0733 by Dedra Vazquez RN  Outcome: Ongoing, Progressing  1/21/2024 0733 by Dedra Vazquez RN  Outcome: Ongoing, Progressing  Goal: Absence of Hospital-Acquired Illness or Injury  1/21/2024 0733 by Dedra Vazquez RN  Outcome: Ongoing, Progressing  1/21/2024 0733 by Dedra Vazquez RN  Outcome: Ongoing, Progressing  Intervention: Identify and Manage Fall Risk  Recent Flowsheet Documentation  Taken 1/21/2024 0600 by Dedra Vazquez RN  Safety Promotion/Fall Prevention:   assistive device/personal items within reach   clutter free environment maintained   fall prevention program maintained   lighting adjusted   nonskid shoes/slippers when out of bed   safety round/check completed  Taken 1/21/2024 0500 by Dedra Vazquez RN  Safety Promotion/Fall Prevention:   assistive device/personal items within reach   clutter free environment maintained   fall prevention program maintained   lighting adjusted   nonskid shoes/slippers when out of bed   safety round/check completed  Taken 1/21/2024 0400 by Dedra Vazquez RN  Safety Promotion/Fall Prevention:   assistive device/personal items within reach   clutter free environment maintained   fall prevention program maintained   lighting adjusted   nonskid shoes/slippers when out of bed   safety round/check completed  Taken 1/21/2024 0300 by Dedra Vazquez RN  Safety Promotion/Fall Prevention:   assistive device/personal items within reach   clutter free environment maintained   fall prevention program maintained   lighting adjusted   nonskid shoes/slippers when out of bed   safety round/check completed  Taken 1/21/2024 0200 by Dedra Vazquez RN  Safety Promotion/Fall Prevention:   assistive device/personal items within reach    clutter free environment maintained   fall prevention program maintained   lighting adjusted   nonskid shoes/slippers when out of bed   safety round/check completed  Taken 1/21/2024 0100 by Dedra Vazquez RN  Safety Promotion/Fall Prevention:   assistive device/personal items within reach   clutter free environment maintained   fall prevention program maintained   lighting adjusted   nonskid shoes/slippers when out of bed   safety round/check completed  Taken 1/21/2024 0000 by Dedra Vazquez RN  Safety Promotion/Fall Prevention:   assistive device/personal items within reach   clutter free environment maintained   fall prevention program maintained   lighting adjusted   nonskid shoes/slippers when out of bed   safety round/check completed  Taken 1/20/2024 2300 by Dedra Vazquez RN  Safety Promotion/Fall Prevention:   assistive device/personal items within reach   clutter free environment maintained   fall prevention program maintained   lighting adjusted   nonskid shoes/slippers when out of bed   safety round/check completed  Taken 1/20/2024 2200 by Dedra Vazquez RN  Safety Promotion/Fall Prevention:   assistive device/personal items within reach   clutter free environment maintained   fall prevention program maintained   lighting adjusted   nonskid shoes/slippers when out of bed   safety round/check completed  Taken 1/20/2024 2100 by Dedra Vazquez RN  Safety Promotion/Fall Prevention:   assistive device/personal items within reach   clutter free environment maintained   fall prevention program maintained   lighting adjusted   nonskid shoes/slippers when out of bed   safety round/check completed  Taken 1/20/2024 2000 by Dedra Vazquez RN  Safety Promotion/Fall Prevention:   activity supervised   assistive device/personal items within reach   clutter free environment maintained   fall prevention program maintained   lighting adjusted   nonskid shoes/slippers when out of bed   safety round/check  completed  Intervention: Prevent Skin Injury  Recent Flowsheet Documentation  Taken 1/21/2024 0600 by Dedra Vazquez RN  Body Position: position changed independently  Taken 1/21/2024 0400 by Dedra Vazquez RN  Body Position: position changed independently  Taken 1/21/2024 0200 by Dedra Vazquez RN  Body Position: position changed independently  Taken 1/21/2024 0000 by Dedra Vazquez RN  Body Position: position changed independently  Taken 1/20/2024 2200 by Dedra Vazquez RN  Body Position: position changed independently  Taken 1/20/2024 2000 by Dedra Vazquez RN  Body Position: position changed independently  Intervention: Prevent and Manage VTE (Venous Thromboembolism) Risk  Recent Flowsheet Documentation  Taken 1/21/2024 0400 by Dedra Vazquez RN  Activity Management: up ad areli  Taken 1/21/2024 0000 by Dedra Vazquez RN  Activity Management: up ad areli  Taken 1/20/2024 2000 by Dedra Vazquez RN  Activity Management: up ad areli  VTE Prevention/Management:   bilateral   sequential compression devices on  Range of Motion: active ROM (range of motion) encouraged  Intervention: Prevent Infection  Recent Flowsheet Documentation  Taken 1/21/2024 0600 by Dedra Vazquez RN  Infection Prevention:   environmental surveillance performed   hand hygiene promoted   personal protective equipment utilized   single patient room provided   rest/sleep promoted  Taken 1/21/2024 0500 by Dedra Vazquez RN  Infection Prevention:   environmental surveillance performed   hand hygiene promoted   personal protective equipment utilized   single patient room provided   rest/sleep promoted  Taken 1/21/2024 0400 by Dedra Vazquez RN  Infection Prevention:   environmental surveillance performed   hand hygiene promoted   personal protective equipment utilized   single patient room provided   rest/sleep promoted  Taken 1/21/2024 0300 by Dedra Vazquez RN  Infection Prevention:   environmental surveillance performed   hand hygiene promoted   personal  protective equipment utilized   single patient room provided   rest/sleep promoted  Taken 1/21/2024 0200 by Dedra Vazquez RN  Infection Prevention:   environmental surveillance performed   hand hygiene promoted   personal protective equipment utilized   single patient room provided   rest/sleep promoted  Taken 1/21/2024 0100 by Dedra Vazquez RN  Infection Prevention:   environmental surveillance performed   hand hygiene promoted   personal protective equipment utilized   single patient room provided   rest/sleep promoted  Taken 1/21/2024 0000 by Dedra Vazquez RN  Infection Prevention:   environmental surveillance performed   hand hygiene promoted   personal protective equipment utilized   single patient room provided   rest/sleep promoted  Taken 1/20/2024 2300 by Dedra Vazquez RN  Infection Prevention:   environmental surveillance performed   hand hygiene promoted   personal protective equipment utilized   single patient room provided   rest/sleep promoted  Taken 1/20/2024 2200 by Dedra Vazquez RN  Infection Prevention:   environmental surveillance performed   hand hygiene promoted   personal protective equipment utilized   single patient room provided   rest/sleep promoted  Taken 1/20/2024 2100 by eDdra Vazquez RN  Infection Prevention:   environmental surveillance performed   hand hygiene promoted   personal protective equipment utilized   single patient room provided   rest/sleep promoted  Taken 1/20/2024 2000 by Dedra Vazquez RN  Infection Prevention:   environmental surveillance performed   equipment surfaces disinfected   hand hygiene promoted   personal protective equipment utilized   rest/sleep promoted   single patient room provided  Goal: Optimal Comfort and Wellbeing  1/21/2024 0733 by Dedra Vazquez RN  Outcome: Ongoing, Progressing  1/21/2024 0733 by Dedra Vazquez RN  Outcome: Ongoing, Progressing  Intervention: Provide Person-Centered Care  Recent Flowsheet Documentation  Taken 1/20/2024 2000  by Dedra Vazquez RN  Trust Relationship/Rapport:   care explained   reassurance provided  Goal: Readiness for Transition of Care  1/21/2024 0733 by Dedra Vazquez RN  Outcome: Ongoing, Progressing  1/21/2024 0733 by Dedra Vazquez RN  Outcome: Ongoing, Progressing     Problem: Adjustment to Illness (Stroke, Hemorrhagic)  Goal: Optimal Coping  1/21/2024 0733 by Dedra Vazquez RN  Outcome: Ongoing, Progressing  1/21/2024 0733 by Dedra Vazquez RN  Outcome: Ongoing, Progressing  Intervention: Support Psychosocial Response to Stroke  Recent Flowsheet Documentation  Taken 1/20/2024 2000 by Dedra Vazquez RN  Family/Support System Care: support provided     Problem: Bowel Elimination Impaired (Stroke, Hemorrhagic)  Goal: Effective Bowel Elimination  1/21/2024 0733 by Dedra Vazquez RN  Outcome: Ongoing, Progressing  1/21/2024 0733 by Dedra Vazquez RN  Outcome: Ongoing, Progressing     Problem: Cerebral Tissue Perfusion (Stroke, Hemorrhagic)  Goal: Optimal Cerebral Tissue Perfusion  1/21/2024 0733 by Dedra Vazquez RN  Outcome: Ongoing, Progressing  1/21/2024 0733 by Dedra Vazquez RN  Outcome: Ongoing, Progressing     Problem: Cognitive Impairment (Stroke, Hemorrhagic)  Goal: Optimal Cognitive Function  1/21/2024 0733 by Dedra Vazquez RN  Outcome: Ongoing, Progressing  1/21/2024 0733 by Dedra Vazquez RN  Outcome: Ongoing, Progressing     Problem: Communication Impairment (Stroke, Hemorrhagic)  Goal: Effective Communication Skills  1/21/2024 0733 by Dedra Vazquez RN  Outcome: Ongoing, Progressing  1/21/2024 0733 by Dedra Vazquez RN  Outcome: Ongoing, Progressing     Problem: Functional Ability Impaired (Stroke, Hemorrhagic)  Goal: Optimal Functional Ability  1/21/2024 0733 by Dedra Vazquez RN  Outcome: Ongoing, Progressing  1/21/2024 0733 by Dedra Vazquez RN  Outcome: Ongoing, Progressing  Intervention: Optimize Functional Ability  Recent Flowsheet Documentation  Taken 1/21/2024 0400 by Dedra Vazquez RN  Activity  Management: up ad areli  Taken 1/21/2024 0000 by Dedra Vazquez RN  Activity Management: up ad areli  Taken 1/20/2024 2000 by Dedra Vazquez RN  Activity Management: up ad areli     Problem: Pain (Stroke, Hemorrhagic)  Goal: Acceptable Pain Control  1/21/2024 0733 by Dedra Vazquez RN  Outcome: Ongoing, Progressing  1/21/2024 0733 by eDdra Vazquez RN  Outcome: Ongoing, Progressing     Problem: Respiratory Compromise (Stroke, Hemorrhagic)  Goal: Effective Oxygenation and Ventilation  1/21/2024 0733 by Dedra Vazquez RN  Outcome: Ongoing, Progressing  1/21/2024 0733 by Dedra Vazquez RN  Outcome: Ongoing, Progressing  Intervention: Optimize Oxygenation and Ventilation  Recent Flowsheet Documentation  Taken 1/21/2024 0600 by Dedra Vazquez RN  Head of Bed (HOB) Positioning: HOB at 20-30 degrees  Taken 1/21/2024 0400 by Dedra Vazquez RN  Head of Bed (HOB) Positioning: HOB at 20-30 degrees  Taken 1/21/2024 0200 by Dedra Vazquez RN  Head of Bed (HOB) Positioning: HOB at 20-30 degrees  Taken 1/21/2024 0000 by Dedra Vazquez RN  Head of Bed (HOB) Positioning: HOB at 20-30 degrees  Taken 1/20/2024 2200 by Dedra Vazquez RN  Head of Bed (HOB) Positioning: HOB at 20-30 degrees  Taken 1/20/2024 2000 by Dedra Vazquez RN  Head of Bed (HOB) Positioning: HOB at 30 degrees     Problem: Sensorimotor Impairment (Stroke, Hemorrhagic)  Goal: Improved Sensorimotor Function  1/21/2024 0733 by Dedra Vazquez RN  Outcome: Ongoing, Progressing  1/21/2024 0733 by Dedra Vazquez RN  Outcome: Ongoing, Progressing  Intervention: Optimize Range of Motion, Motor Control and Function  Recent Flowsheet Documentation  Taken 1/21/2024 0600 by Dedra Vazquez RN  Positioning/Transfer Devices: pillows  Taken 1/21/2024 0400 by Dedra Vazquez RN  Positioning/Transfer Devices: pillows  Taken 1/21/2024 0200 by Dedra Vazquez RN  Positioning/Transfer Devices: pillows  Taken 1/21/2024 0000 by Dedra Vazquez, RN  Positioning/Transfer Devices: pillows  Taken  1/20/2024 2000 by Dedra Vazquez RN  Positioning/Transfer Devices: pillows  Range of Motion: active ROM (range of motion) encouraged     Problem: Swallowing Impairment (Stroke, Hemorrhagic)  Goal: Optimal Eating and Swallowing Without Aspiration  1/21/2024 0733 by Dedra Vazquez RN  Outcome: Ongoing, Progressing  1/21/2024 0733 by Dedra Vazquez RN  Outcome: Ongoing, Progressing     Problem: Urinary Elimination Impaired (Stroke, Hemorrhagic)  Goal: Effective Urinary Elimination  1/21/2024 0733 by Dedra Vazquez RN  Outcome: Ongoing, Progressing  1/21/2024 0733 by Dedra Vazquez RN  Outcome: Ongoing, Progressing     Problem: Pain Acute  Goal: Acceptable Pain Control and Functional Ability  1/21/2024 0733 by Dedra Vazquez RN  Outcome: Ongoing, Progressing  1/21/2024 0733 by Dedra Vazquez RN  Outcome: Ongoing, Progressing  Intervention: Prevent or Manage Pain  Recent Flowsheet Documentation  Taken 1/21/2024 0600 by Dedra Vazquez RN  Medication Review/Management: medications reviewed  Taken 1/21/2024 0500 by Dedra Vazquez RN  Medication Review/Management: medications reviewed  Taken 1/21/2024 0400 by Dedra Vazquez RN  Medication Review/Management: medications reviewed  Taken 1/21/2024 0300 by Dedra Vazquez RN  Medication Review/Management: medications reviewed  Taken 1/21/2024 0200 by Dedra Vazquez RN  Medication Review/Management: medications reviewed  Taken 1/21/2024 0100 by Dedra Vazquez RN  Medication Review/Management: medications reviewed  Taken 1/21/2024 0000 by Dedra Vazquez RN  Medication Review/Management: medications reviewed  Taken 1/20/2024 2300 by Dedra Vazquez RN  Medication Review/Management: medications reviewed  Taken 1/20/2024 2200 by Dedra Vazquez RN  Medication Review/Management: medications reviewed  Taken 1/20/2024 2100 by Dedra Vazquez RN  Medication Review/Management: medications reviewed  Taken 1/20/2024 2000 by Dedra Vazquez RN  Medication Review/Management: medications  reviewed  Intervention: Optimize Psychosocial Wellbeing  Recent Flowsheet Documentation  Taken 1/20/2024 2000 by Dedra Vazquez RN  Diversional Activities:   smartphone   television     Problem: Skin Injury Risk Increased  Goal: Skin Health and Integrity  1/21/2024 0733 by Dedra Vazquez RN  Outcome: Ongoing, Progressing  1/21/2024 0733 by Dedra Vazquez RN  Outcome: Ongoing, Progressing  Intervention: Optimize Skin Protection  Recent Flowsheet Documentation  Taken 1/21/2024 0600 by Dedra Vazquez RN  Head of Bed (HOB) Positioning: HOB at 20-30 degrees  Taken 1/21/2024 0400 by Dedra Vazquez RN  Head of Bed (HOB) Positioning: HOB at 20-30 degrees  Taken 1/21/2024 0200 by Dedra Vazquez RN  Head of Bed (HOB) Positioning: HOB at 20-30 degrees  Taken 1/21/2024 0000 by Dedra Vazquez RN  Head of Bed (HOB) Positioning: HOB at 20-30 degrees  Taken 1/20/2024 2200 by Dedra Vazquez RN  Head of Bed (HOB) Positioning: HOB at 20-30 degrees  Taken 1/20/2024 2000 by Dedra Vazquez RN  Head of Bed (HOB) Positioning: HOB at 30 degrees   Goal Outcome Evaluation:

## 2024-01-21 NOTE — PROGRESS NOTES
[facility] INPATIENT PROGRESS NOTE         [facility] INTENSIVE CARE    2024      PATIENT IDENTIFICATION:  Name: Binta Morales ADMIT: 2024   : 1965  PCP: Alma Delia Rudolph PA-C    MRN: [id number] LOS: 5 days   AGE/SEX: 58 y.o. female  ROOM: [facility]                     LOS 5    Reason for visit: Subarachnoid hemorrhage due to ACOM aneurysm      SUBJECTIVE:      On phenylephrine drip for goal blood pressure 120s to 160s.  Discussed with Dr. George.  Discussed with nursing staff.  Place PICC line.      Objective   OBJECTIVE:    Vital Sign Min/Max for last 24 hours  Temp  Min: 98.9 °F (37.2 °C)  Max: 101.3 °F (38.5 °C)   BP  Min: 102/48  Max: 161/79   Pulse  Min: 49  Max: 100   No data recorded   SpO2  Min: 92 %  Max: 100 %   No data recorded   No data recorded    Vitals:    24 0946 24 1001 24 1016 24 1031   BP: 140/71 149/75 144/69 132/68   Pulse: 73 74 72 77   Resp:       Temp:       TempSrc:       SpO2: 98% 97% 96% 96%   Weight:       Height:                24  0545 24  0500   Weight: 68.9 kg (151 lb 14.4 oz) 65.7 kg (144 lb 13.5 oz)       Body mass index is 24.1 kg/m².                          Body mass index is 24.1 kg/m².    Intake/Output Summary (Last 24 hours) at 2024 1059  Last data filed at 2024 0600  Gross per 24 hour   Intake 2650.47 ml   Output --   Net 2650.47 ml         Exam:  GEN:  No distress, appears stated age  EYES:   PERRL, anicteric sclerae  ENT:    External ears/nose normal, OP clear  NECK:  No adenopathy, midline trachea  LUNGS: Normal chest on inspection, palpation and auscultation  CV:  Normal S1S2, without murmur  ABD:  Nontender, nondistended, no hepatosplenomegaly, +BS  EXT:  No edema.  No cyanosis or clubbing.  No mottling and normal cap refill.    Assessment     Scheduled meds:  amoxicillin-clavulanate, 1 tablet, Oral, Q12H  aspirin, 81 mg, Oral, Daily  atorvastatin, 20 mg, Oral, Nightly  levETIRAcetam, 500 mg,  "Intravenous, Q12H  niMODipine, 60 mg, Oral, Q4H  senna-docusate sodium, 2 tablet, Oral, BID  sodium chloride, 10 mL, Intravenous, Q12H      IV meds:                      hold, 1 each  phenylephrine, 0.5-3 mcg/kg/min, Last Rate: 1 mcg/kg/min (01/21/24 0854)  sodium chloride, 100 mL/hr, Last Rate: 100 mL/hr (01/21/24 0731)      Data Review:  Results from last 7 days   Lab Units 01/21/24  0441 01/20/24  0450 01/19/24  0417 01/18/24  0527 01/17/24  0611   SODIUM mmol/L 134* 141 137 140 136   POTASSIUM mmol/L 3.9 3.7 4.0 4.2 2.7*   CHLORIDE mmol/L 101 104 103 106 102   CO2 mmol/L 21.0* 22.0 20.4* 22.0 20.0*   BUN mg/dL 12 13 15 15 13   CREATININE mg/dL 0.60 0.57 0.51* 0.76 0.63   GLUCOSE mg/dL 117* 103* 119* 124* 122*   CALCIUM mg/dL 9.3 9.1 9.3 9.9 8.8         Estimated Creatinine Clearance: 104.7 mL/min (by C-G formula based on SCr of 0.6 mg/dL).  Results from last 7 days   Lab Units 01/21/24  0441 01/20/24  0450 01/19/24  0417 01/18/24  0527 01/17/24  0611   WBC 10*3/mm3 13.98* 10.32 11.98* 14.91* 11.33*   HEMOGLOBIN g/dL 12.9 13.2 12.5 13.7 12.9   PLATELETS 10*3/mm3 477* 338 295 317 164     Results from last 7 days   Lab Units 01/16/24  1253   INR  1.09     Results from last 7 days   Lab Units 01/16/24  1138   ALT (SGPT) U/L 39*   AST (SGOT) U/L 17         Results from last 7 days   Lab Units 01/17/24  2124   PROCALCITONIN ng/mL 0.22   LACTATE mmol/L 1.0         No results found for: \"HGBA1C\", \"POCGLU\"        Imaging reviewed  Repeat CT head 1/20 reviewed: Slightly less conspicuous small amount of bilateral subarachnoid hemorrhage.    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 coli UTI      PLAN:  Neurochecks per protocol.  Dopplers to evaluate vasospasm per neurosurgery recommendations.  On Nimotop.  Pressors as needed to keep blood pressure in required goal of 120-160.  PICC line.  Continue antibiotic for aspiration pneumonia/UTI coverage.  Keppra for seizure prophylaxis.  Control glucose.    Control blood pressure.  Mechanical DVT prophylaxis.  Discussed with nursing staff at bedside.        CCT: 32 min    Joshua Wilson MD  Pulmonary and Critical Care Medicine  [facility], [facility]  1/21/2024    10:59 EST

## 2024-01-21 NOTE — PROGRESS NOTES
No events.  Some worsening of headache overnight but tolerable.     Awake and alert  Extraocular movements intact  No drift  Moving all extremities     Plan: 58-year-old female status post coiling of ruptured ACOM aneurysm  -Neuro stable; no evidence of symptomatic vasospasm  -Continue SAH protocol  -ICU care

## 2024-01-22 LAB
ANION GAP SERPL CALCULATED.3IONS-SCNC: 11.4 MMOL/L (ref 5–15)
BACTERIA SPEC AEROBE CULT: NORMAL
BACTERIA SPEC AEROBE CULT: NORMAL
BUN SERPL-MCNC: 7 MG/DL (ref 6–20)
BUN/CREAT SERPL: 15.6 (ref 7–25)
CALCIUM SPEC-SCNC: 8.7 MG/DL (ref 8.6–10.5)
CHLORIDE SERPL-SCNC: 102 MMOL/L (ref 98–107)
CO2 SERPL-SCNC: 21.6 MMOL/L (ref 22–29)
CREAT SERPL-MCNC: 0.45 MG/DL (ref 0.57–1)
DEPRECATED RDW RBC AUTO: 38.7 FL (ref 37–54)
EGFRCR SERPLBLD CKD-EPI 2021: 111 ML/MIN/1.73
ERYTHROCYTE [DISTWIDTH] IN BLOOD BY AUTOMATED COUNT: 12.6 % (ref 12.3–15.4)
GLUCOSE SERPL-MCNC: 110 MG/DL (ref 65–99)
HCT VFR BLD AUTO: 34.1 % (ref 34–46.6)
HGB BLD-MCNC: 11.3 G/DL (ref 12–15.9)
MCH RBC QN AUTO: 28.3 PG (ref 26.6–33)
MCHC RBC AUTO-ENTMCNC: 33.1 G/DL (ref 31.5–35.7)
MCV RBC AUTO: 85.3 FL (ref 79–97)
PLATELET # BLD AUTO: 469 10*3/MM3 (ref 140–450)
PMV BLD AUTO: 9.4 FL (ref 6–12)
POTASSIUM SERPL-SCNC: 3.8 MMOL/L (ref 3.5–5.2)
RBC # BLD AUTO: 4 10*6/MM3 (ref 3.77–5.28)
SODIUM SERPL-SCNC: 135 MMOL/L (ref 136–145)
WBC NRBC COR # BLD AUTO: 14.46 10*3/MM3 (ref 3.4–10.8)

## 2024-01-22 PROCEDURE — 25010000002 PHENYLEPHRINE 10 MG/ML SOLUTION

## 2024-01-22 PROCEDURE — 63710000001 DIPHENHYDRAMINE PER 50 MG: Performed by: INTERNAL MEDICINE

## 2024-01-22 PROCEDURE — 85027 COMPLETE CBC AUTOMATED: CPT | Performed by: INTERNAL MEDICINE

## 2024-01-22 PROCEDURE — 80048 BASIC METABOLIC PNL TOTAL CA: CPT | Performed by: INTERNAL MEDICINE

## 2024-01-22 PROCEDURE — 25010000002 ENOXAPARIN PER 10 MG: Performed by: NURSE PRACTITIONER

## 2024-01-22 PROCEDURE — 97530 THERAPEUTIC ACTIVITIES: CPT

## 2024-01-22 PROCEDURE — 25010000002 LEVETRIRACETAM PER 10 MG: Performed by: INTERNAL MEDICINE

## 2024-01-22 PROCEDURE — 25810000003 SODIUM CHLORIDE 0.9 % SOLUTION

## 2024-01-22 RX ORDER — DIPHENHYDRAMINE HCL 25 MG
25 CAPSULE ORAL EVERY 6 HOURS PRN
Status: DISCONTINUED | OUTPATIENT
Start: 2024-01-22 | End: 2024-01-26 | Stop reason: HOSPADM

## 2024-01-22 RX ORDER — ENOXAPARIN SODIUM 100 MG/ML
40 INJECTION SUBCUTANEOUS EVERY 24 HOURS
Status: DISCONTINUED | OUTPATIENT
Start: 2024-01-22 | End: 2024-01-26 | Stop reason: HOSPADM

## 2024-01-22 RX ORDER — LEVETIRACETAM 500 MG/1
500 TABLET ORAL EVERY 12 HOURS SCHEDULED
Status: DISCONTINUED | OUTPATIENT
Start: 2024-01-22 | End: 2024-01-26 | Stop reason: HOSPADM

## 2024-01-22 RX ADMIN — DIPHENHYDRAMINE HYDROCHLORIDE 25 MG: 25 CAPSULE ORAL at 20:44

## 2024-01-22 RX ADMIN — Medication 10 ML: at 20:36

## 2024-01-22 RX ADMIN — Medication 10 ML: at 08:41

## 2024-01-22 RX ADMIN — Medication 10 ML: at 08:42

## 2024-01-22 RX ADMIN — NIMODIPINE 60 MG: 30 CAPSULE, LIQUID FILLED ORAL at 13:00

## 2024-01-22 RX ADMIN — ACETAMINOPHEN 650 MG: 325 TABLET, FILM COATED ORAL at 05:21

## 2024-01-22 RX ADMIN — SODIUM CHLORIDE 100 ML/HR: 4.5 INJECTION, SOLUTION INTRAVENOUS at 10:13

## 2024-01-22 RX ADMIN — NIMODIPINE 60 MG: 30 CAPSULE, LIQUID FILLED ORAL at 16:34

## 2024-01-22 RX ADMIN — NIMODIPINE 60 MG: 30 CAPSULE, LIQUID FILLED ORAL at 20:36

## 2024-01-22 RX ADMIN — PHENYLEPHRINE HYDROCHLORIDE 1 MCG/KG/MIN: 50 INJECTION INTRAVENOUS at 11:51

## 2024-01-22 RX ADMIN — Medication 10 ML: at 08:39

## 2024-01-22 RX ADMIN — ATORVASTATIN CALCIUM 20 MG: 20 TABLET, FILM COATED ORAL at 20:35

## 2024-01-22 RX ADMIN — LEVETIRACETAM 500 MG: 500 TABLET, FILM COATED ORAL at 20:35

## 2024-01-22 RX ADMIN — ACETAMINOPHEN 650 MG: 325 TABLET, FILM COATED ORAL at 20:35

## 2024-01-22 RX ADMIN — LEVETIRACETAM 500 MG: 500 INJECTION, SOLUTION INTRAVENOUS at 08:39

## 2024-01-22 RX ADMIN — NIMODIPINE 60 MG: 30 CAPSULE, LIQUID FILLED ORAL at 07:58

## 2024-01-22 RX ADMIN — SODIUM CHLORIDE 100 ML/HR: 4.5 INJECTION, SOLUTION INTRAVENOUS at 01:03

## 2024-01-22 RX ADMIN — PHENYLEPHRINE HYDROCHLORIDE 1 MCG/KG/MIN: 50 INJECTION INTRAVENOUS at 01:03

## 2024-01-22 RX ADMIN — ENOXAPARIN SODIUM 40 MG: 100 INJECTION SUBCUTANEOUS at 11:51

## 2024-01-22 RX ADMIN — ACETAMINOPHEN 650 MG: 325 TABLET, FILM COATED ORAL at 16:34

## 2024-01-22 RX ADMIN — ASPIRIN 81 MG: 81 TABLET, COATED ORAL at 08:38

## 2024-01-22 RX ADMIN — DOCUSATE SODIUM 50MG AND SENNOSIDES 8.6MG 2 TABLET: 8.6; 5 TABLET, FILM COATED ORAL at 20:35

## 2024-01-22 RX ADMIN — NIMODIPINE 60 MG: 30 CAPSULE, LIQUID FILLED ORAL at 04:37

## 2024-01-22 NOTE — PROGRESS NOTES
[facility]PC INPATIENT PROGRESS NOTE         [facility] INTENSIVE CARE    2024      PATIENT IDENTIFICATION:  Name: Binta Morales ADMIT: 2024   : 1965  PCP: Alma Delia Rudolph PA-C    MRN: [id number] LOS: 6 days   AGE/SEX: 58 y.o. female  ROOM: [facility]                     LOS 6    Reason for visit: Subarachnoid hemorrhage due to ACOM aneurysm      SUBJECTIVE:      On phenylephrine drip for goal blood pressure 120s to 160s.  No new complaints overnight.      Objective   OBJECTIVE:    Vital Sign Min/Max for last 24 hours  Temp  Min: 98 °F (36.7 °C)  Max: 100.8 °F (38.2 °C)   BP  Min: 109/59  Max: 159/76   Pulse  Min: 46  Max: 97   No data recorded   SpO2  Min: 92 %  Max: 100 %   No data recorded   No data recorded    Vitals:    24 0953 24 1000 24 1015 24 1030   BP: 109/59 133/66 135/60 144/65   Pulse: 58 64 (!) 49 68   Resp:       Temp:       TempSrc:       SpO2: 98% 92% 96% 99%   Weight:       Height:                24  0545 24  0500   Weight: 68.9 kg (151 lb 14.4 oz) 65.7 kg (144 lb 13.5 oz)       Body mass index is 24.1 kg/m².                          Body mass index is 24.1 kg/m².    Intake/Output Summary (Last 24 hours) at 2024 1045  Last data filed at 2024 1800  Gross per 24 hour   Intake 1892.79 ml   Output --   Net 1892.79 ml         Exam:  GEN:  No distress, appears stated age  EYES:   PERRL, anicteric sclerae  ENT:    External ears/nose normal, OP clear  NECK:  No adenopathy, midline trachea  LUNGS: Normal chest on inspection, palpation and auscultation  CV:  Normal S1S2, without murmur  ABD:  Nontender, nondistended, no hepatosplenomegaly, +BS  EXT:  No edema.  No cyanosis or clubbing.  No mottling and normal cap refill.    Assessment     Scheduled meds:  aspirin, 81 mg, Oral, Daily  atorvastatin, 20 mg, Oral, Nightly  levETIRAcetam, 500 mg, Intravenous, Q12H  niMODipine, 60 mg, Oral, Q4H  senna-docusate sodium, 2 tablet, Oral,  "BID  sodium chloride, 10 mL, Intravenous, Q12H  sodium chloride, 10 mL, Intravenous, Q12H  sodium chloride, 10 mL, Intravenous, Q12H      IV meds:                      hold, 1 each  Pharmacy to Dose enoxaparin (LOVENOX),   phenylephrine, 0.5-3 mcg/kg/min, Last Rate: 1.6 mcg/kg/min (01/22/24 0953)  sodium chloride, 100 mL/hr, Last Rate: 100 mL/hr (01/22/24 1013)      Data Review:  Results from last 7 days   Lab Units 01/22/24  0437 01/21/24 0441 01/20/24 0450 01/19/24 0417 01/18/24  0527   SODIUM mmol/L 135* 134* 141 137 140   POTASSIUM mmol/L 3.8 3.9 3.7 4.0 4.2   CHLORIDE mmol/L 102 101 104 103 106   CO2 mmol/L 21.6* 21.0* 22.0 20.4* 22.0   BUN mg/dL 7 12 13 15 15   CREATININE mg/dL 0.45* 0.60 0.57 0.51* 0.76   GLUCOSE mg/dL 110* 117* 103* 119* 124*   CALCIUM mg/dL 8.7 9.3 9.1 9.3 9.9         Estimated Creatinine Clearance: 139.6 mL/min (A) (by C-G formula based on SCr of 0.45 mg/dL (L)).  Results from last 7 days   Lab Units 01/22/24  0437 01/21/24 0441 01/20/24 0450 01/19/24 0417 01/18/24  0527   WBC 10*3/mm3 14.46* 13.98* 10.32 11.98* 14.91*   HEMOGLOBIN g/dL 11.3* 12.9 13.2 12.5 13.7   PLATELETS 10*3/mm3 469* 477* 338 295 317     Results from last 7 days   Lab Units 01/16/24  1253   INR  1.09     Results from last 7 days   Lab Units 01/16/24  1138   ALT (SGPT) U/L 39*   AST (SGOT) U/L 17         Results from last 7 days   Lab Units 01/17/24  2124   PROCALCITONIN ng/mL 0.22   LACTATE mmol/L 1.0         No results found for: \"HGBA1C\", \"POCGLU\"        Imaging reviewed  Repeat CT head 1/20 reviewed: Slightly less conspicuous small amount of bilateral subarachnoid hemorrhage.    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         ASSESSMENT:  ACOM aneurysm " status post rupture and repair  Subarachnoid hemorrhage secondary to above  Hypoxemia mainly while asleep with sats of 88% on room air while asleep  Hypertension by history  Aspiration pneumonitis  E. coli UTI      PLAN:  Neurochecks per protocol.  Dopplers to evaluate vasospasm per neurosurgery recommendations.  On Nimotop.  Pressors as needed to keep blood pressure in required goal of 120-160.  Completed antibiotic for UTI coverage.  Keppra for seizure prophylaxis.  Control glucose.    Control blood pressure.  Mechanical DVT prophylaxis.  Discussed with nursing staff at bedside.    Discussed with multidisciplinary ICU team on rounds this morning.      CCT: 31 min    Joshua Wilson MD  Pulmonary and Critical Care Medicine  [facility]  1/22/2024    10:45 EST

## 2024-01-22 NOTE — PROGRESS NOTES
[facility] NEUROSURGERY INTRACRANIAL PROGRESS NOTE    PATIENT IDENTIFICATION:   Name:  Binta Morales      MRN:  [id number]     58 y.o.  female               CC: SAH secondary to ruptured ACOM aneurysm postprocedure day 6 for embolization and successful web placement. spouse present at bedside.       Subjective     Interval History: no new complaints or events overnight. still with intermittent headache.     ROS:  Constitutional: No fever, chills  HEENT: + headache, no vision changes, no tinnitus, no hearing difficulties  Neck: no stiffness  GI: No nausea, vomiting, no swallow difficulties  Neuro: No numbness, tingling, or weakness, no speech difficulties, no balance difficulties    Objective     Vital signs in last 24 hours:  Temp:  [98 °F (36.7 °C)-100.8 °F (38.2 °C)] 98 °F (36.7 °C)  Heart Rate:  [46-97] 53  BP: (116-159)/(56-92) 123/64    Intake/Output this shift:  No intake/output data recorded.    Intake/Output last 3 shifts:  I/O last 3 completed shifts:  In: 4003.5 [P.O.:680; I.V.:3323.5]  Out: -     LABS:  Results from last 7 days   Lab Units 01/22/24  0437 01/21/24  0441 01/20/24  0450   WBC 10*3/mm3 14.46* 13.98* 10.32   HEMOGLOBIN g/dL 11.3* 12.9 13.2   HEMATOCRIT % 34.1 37.6 38.3   PLATELETS 10*3/mm3 469* 477* 338       Results from last 7 days   Lab Units 01/22/24  0437 01/21/24  0441 01/20/24  0450   SODIUM mmol/L 135* 134* 141   POTASSIUM mmol/L 3.8 3.9 3.7   CHLORIDE mmol/L 102 101 104   CO2 mmol/L 21.6* 21.0* 22.0   BUN mg/dL 7 12 13   CREATININE mg/dL 0.45* 0.60 0.57   GLUCOSE mg/dL 110* 117* 103*   CALCIUM mg/dL 8.7 9.3 9.1          IMAGING STUDIES:  No radiology results for the last day      I personally viewed and interpreted the patient's chart.    Meds reviewed    Physical exam  Alert, awake, oriented x3  Pupils equal round reactive to light  Extraocular muscles intact  Face symmetric  Speech is fluent and clear  No pronator drift  Motor exam  Bilateral deltoids 5/5, bilateral biceps 5/5,  "bilateral triceps 5/5, bilateral wrist extension 5/5 bilateral hand  5/5  Bilateral hip flexion 5/5, bilateral knee extension 5/5, bilateral DF/PF 5/5  gait deferred  Able to detect light touch in all 4 extremities  right orbital ecchymosis   Right groin site soft, flat, no hematoma, pedal pulses present    Assessment & Plan     ASSESSMENT:      SAH (subarachnoid hemorrhage)    Ruptured aneurysm of anterior communicating artery    Communicating hydrocephalus    58-year-old female with SAH secondary to ruptured ACOM aneurysm postprocedure day 6 for embolization and successful web placement    PLAN:     Continue to monitor in ICU, can hopefully transfer to the floor in the next day or so  Neurochecks q2h  Nimodipine x 21 days  Keppra  SBP goal 100-180 mmHg  Daily TCD's  Okay for DVT prophylaxis     I discussed the patient's findings and my recommendations with Dr. Duncan, patient, family, nursing staff, and primary care team    I spent 35 minutes caring for Binta Morales on this date of service. This time includes time spent by me in the following activities: preparing for the visit, reviewing tests, obtaining and/or reviewing a separately obtained history, performing a medically appropriate examination and/or evaluation, counseling and educating the patient/family/caregiver, ordering medications, tests, or procedures, referring and communicating with other health care professionals, documenting information in the medical record, independently interpreting results and communicating that information with the patient/family/caregiver, and care coordination          LOS: 6 days       Alma Delia Gutierrez, APRN  1/22/2024  09:43 EST    \"Dictated utilizing Dragon dictation\".      "

## 2024-01-22 NOTE — PROGRESS NOTES
"[facility]: Enoxaparin Consult    Binta Morales has a pharmacy consult to dose prophylactic enoxaparin per Alma Delia LAWSON's request.     Indication: VTE Prophylaxis  Home Anticoagulation: None      Relevant clinical data and objective history reviewed:  58 y.o. female 165.1 cm (65\") 65.7 kg (144 lb 13.5 oz)   Body mass index is 24.1 kg/m².   Results from last 7 days   Lab Units 01/22/24  0437   PLATELETS 10*3/mm3 469*     Estimated Creatinine Clearance: 139.6 mL/min (A) (by C-G formula based on SCr of 0.45 mg/dL (L)).    Assessment/Plan    Will start patient on 40mg subcutaneous every 24 hours, adjusted for renal function. Consult order will be discontinued but pharmacy will continue to follow.     Kristie James [facility]  Clinical Pharmacist  "

## 2024-01-22 NOTE — SIGNIFICANT NOTE
01/22/24 1430   OTHER   Discipline occupational therapist   Rehab Time/Intention   Session Not Performed other (see comments)  (spoke with pt, she reports no concerns with ADL task completion, plans to d/c home with spouse who works from home, no OT needs at d/c. Will sign off, pt agreeable, RN aware.)

## 2024-01-22 NOTE — THERAPY TREATMENT NOTE
Patient Name: Binta Morales  : 1965    MRN: [id number]                              Today's Date: 2024       Admit Date: 2024    Visit Dx:     ICD-10-CM ICD-9-CM   1. SAH (subarachnoid hemorrhage)  I60.9 430     Patient Active Problem List   Diagnosis    SAH (subarachnoid hemorrhage)    Ruptured aneurysm of anterior communicating artery    Communicating hydrocephalus     Past Medical History:   Diagnosis Date    Hypertension      Past Surgical History:   Procedure Laterality Date    EMBOLIZATION CEREBRAL N/A 2024    Procedure: CEREBRAL ANGIOGRAM WITH EMBOLIZATION;  Surgeon: Angelito Gamble MD;  Location: [facility] ;  Service: Interventional Radiology;  Laterality: N/A;      General Information       Row Name 24 1545          Physical Therapy Time and Intention    Document Type therapy note (daily note)  -DB     Mode of Treatment physical therapy;individual therapy  -DB       Row Name 24 1545          General Information    Patient Profile Reviewed yes  -DB               User Key  (r) = Recorded By, (t) = Taken By, (c) = Cosigned By      Initials Name Provider Type    DB Sierra Thao PT Physical Therapist                   Mobility       Row Name 24 1545          Bed Mobility    Bed Mobility supine-sit  -DB     Supine-Sit [place] (Bed Mobility) supervision  -DB     Assistive Device (Bed Mobility) bed rails;head of bed elevated  -DB       Row Name 24 1545          Sit-Stand Transfer    Sit-Stand [place] (Transfers) contact guard  -DB     Assistive Device (Sit-Stand Transfers) other (see comments)  HHA  -DB       Row Name 24 1545          Gait/Stairs (Locomotion)    [place] Level (Gait) contact guard  -DB     Assistive Device (Gait) other (see comments)  HHA  -DB     Distance in Feet (Gait) 5  -DB     Deviations/Abnormal Patterns (Gait) clarissa decreased;gait speed decreased  -DB               User Key  (r) = Recorded By, (t) = Taken  By, (c) = Cosigned By      Initials Name Provider Type    Sierra Guzmán PT Physical Therapist                   Obj/Interventions       Row Name 01/22/24 1546          Motor Skills    Therapeutic Exercise other (see comments)  seated MIP, LAQ, AP x 15 BLE  -DB       Row Name 01/22/24 1546          Balance    Balance Assessment sitting static balance;sitting dynamic balance;standing static balance;standing dynamic balance  -DB     Static Sitting Balance supervision  -DB     Dynamic Sitting Balance supervision  -DB     Position, Sitting Balance unsupported;sitting edge of bed  -DB     Static Standing Balance contact guard  -DB     Dynamic Standing Balance contact guard  -DB     Position/Device Used, Standing Balance supported  -DB     Balance Interventions sitting;standing;sit to stand  -DB               User Key  (r) = Recorded By, (t) = Taken By, (c) = Cosigned By      Initials Name Provider Type    Sierra Guzmán PT Physical Therapist                   Goals/Plan    No documentation.                  Clinical Impression       Row Name 01/22/24 1547          Pain    Pain Intervention(s) Ambulation/increased activity;Repositioned  -DB       Row Name 01/22/24 1547          Plan of Care Review    Plan of Care Reviewed With patient  -DB     Progress improving  -DB     Outcome Evaluation Pt was supine in bed at start of the session and agreeable to work with PT. Pt was able to perform bed mobility with SV and transferred over to the chair with CGA. Pt performs seated LE ther ex. She feels comfortable with D/C home with her 's assist when stable.  -DB       Row Name 01/22/24 1547          Vital Signs    O2 Delivery Pre Treatment room air  -DB     O2 Delivery Intra Treatment room air  -DB     O2 Delivery Post Treatment room air  -DB     Pre Patient Position Supine  -DB     Intra Patient Position Standing  -DB     Post Patient Position Sitting  -DB       Row Name 01/22/24 1542          Positioning and  Restraints    Pre-Treatment Position in bed  -DB     Post Treatment Position chair  -DB     In Chair reclined;sitting;call light within reach;encouraged to call for assist;with family/caregiver  -DB               User Key  (r) = Recorded By, (t) = Taken By, (c) = Cosigned By      Initials Name Provider Type    Sierra Guzmán PT Physical Therapist                   Outcome Measures       Row Name 01/22/24 1553          How much help from another person do you currently need...    Turning from your back to your side while in flat bed without using bedrails? 4  -DB     Moving from lying on back to sitting on the side of a flat bed without bedrails? 4  -DB     Moving to and from a bed to a chair (including a wheelchair)? 4  -DB     Standing up from a chair using your arms (e.g., wheelchair, bedside chair)? 4  -DB     Climbing 3-5 steps with a railing? 3  -DB     To walk in hospital room? 3  -DB     AM-PAC 6 Clicks Score (PT) 22  -DB     Highest Level of Mobility Goal 7 --> Walk 25 feet or more  -DB       Row Name 01/22/24 1553          Functional Assessment    Outcome Measure Options AM-PAC 6 Clicks Basic Mobility (PT)  -DB               User Key  (r) = Recorded By, (t) = Taken By, (c) = Cosigned By      Initials Name Provider Type    Sierra Guzmán PT Physical Therapist                                 Physical Therapy Education       Title: PT OT SLP Therapies (Done)       Topic: Physical Therapy (Done)       Point: Mobility training (Done)       Learning Progress Summary             Patient Acceptance, E, VU by DB at 1/22/2024 1553    Acceptance, TB,E, NR by ST at 1/20/2024 1513    Acceptance, E, VU by ER at 1/19/2024 1128    Acceptance, E,TB, VU by MS at 1/18/2024 1150                         Point: Home exercise program (Done)       Learning Progress Summary             Patient Acceptance, E, VU by DB at 1/22/2024 1553    Acceptance, E, VU by ER at 1/19/2024 1128    Acceptance, E,TB, VU by MS at  1/18/2024 1150                         Point: Body mechanics (Done)       Learning Progress Summary             Patient Acceptance, E, VU by DB at 1/22/2024 1553    Acceptance, E, VU by ER at 1/19/2024 1128    Acceptance, E,TB, VU by MS at 1/18/2024 1150                         Point: Precautions (Done)       Learning Progress Summary             Patient Acceptance, E, VU by DB at 1/22/2024 1553    Acceptance, TB,E, NR by ST at 1/20/2024 1513    Acceptance, E, VU by ER at 1/19/2024 1128    Acceptance, E,TB, VU by MS at 1/18/2024 1150                                         User Key       Initials Effective Dates Name Provider Type Discipline    MS 06/16/21 -  Juana Kauffman, PT Physical Therapist PT    DB 06/16/21 -  Sierra Thao, PT Physical Therapist PT    ST 09/22/22 -  Shannon Tolliver, PT Physical Therapist PT    ER 10/15/23 -  Bela Forte, PT Physical Therapist PT                  PT Recommendation and Plan     Plan of Care Reviewed With: patient  Progress: improving  Outcome Evaluation: Pt was supine in bed at start of the session and agreeable to work with PT. Pt was able to perform bed mobility with SV and transferred over to the chair with CGA. Pt performs seated LE ther ex. She feels comfortable with D/C home with her 's assist when stable.     Time Calculation:         PT Charges       Row Name 01/22/24 1553             Time Calculation    Start Time 1515  -DB      Stop Time 1526  -DB      Time Calculation (min) 11 min  -DB      PT Received On 01/22/24  -DB      PT - Next Appointment 01/23/24  -DB         Time Calculation- PT    Total Timed Code Minutes- PT 11 minute(s)  -DB                User Key  (r) = Recorded By, (t) = Taken By, (c) = Cosigned By      Initials Name Provider Type    DB Sierra Thao, PT Physical Therapist                  Therapy Charges for Today       Code Description Service Date Service Provider Modifiers Qty    [id number]  PT THERAPEUTIC ACT EA 15 MIN  1/22/2024 Sierra Thao, PT GP 1            PT G-Codes  Outcome Measure Options: AM-PAC 6 Clicks Basic Mobility (PT)  AM-PAC 6 Clicks Score (PT): 22  AM-PAC 6 Clicks Score (OT): 21  Modified Hickman Scale: 2 - Slight disability.  Unable to carry out all previous activities but able to look after own affairs without assistance.  PT Discharge Summary  Anticipated Discharge Disposition (PT): home with assist, home with home health    Sierra Thao, PT  1/22/2024

## 2024-01-22 NOTE — PLAN OF CARE
Goal Outcome Evaluation:  Plan of Care Reviewed With: patient        Progress: improving  Outcome Evaluation: Pt was supine in bed at start of the session and agreeable to work with PT. Pt was able to perform bed mobility with SV and transferred over to the chair with CGA. Pt performs seated LE ther ex. She feels comfortable with D/C home with her 's assist when stable.      Anticipated Discharge Disposition (PT): home with assist, home with home health

## 2024-01-22 NOTE — PLAN OF CARE
Shift summary: Lfy and 1/2 NS continue to infuse. Mild headache, improved with tylenol. Febrile this am, 100.8 F    Goal Outcome Evaluation:     Problem: Adult Inpatient Plan of Care  Goal: Plan of Care Review  Outcome: Ongoing, Progressing  Goal: Patient-Specific Goal (Individualized)  Outcome: Ongoing, Progressing  Goal: Absence of Hospital-Acquired Illness or Injury  Outcome: Ongoing, Progressing  Intervention: Identify and Manage Fall Risk  Recent Flowsheet Documentation  Taken 1/22/2024 0600 by Dedra Vazquez RN  Safety Promotion/Fall Prevention:   assistive device/personal items within reach   clutter free environment maintained   fall prevention program maintained   lighting adjusted   nonskid shoes/slippers when out of bed   safety round/check completed  Taken 1/22/2024 0500 by Dedra Vazquez RN  Safety Promotion/Fall Prevention:   assistive device/personal items within reach   clutter free environment maintained   fall prevention program maintained   lighting adjusted   nonskid shoes/slippers when out of bed   safety round/check completed  Taken 1/22/2024 0400 by Dedra Vazquez RN  Safety Promotion/Fall Prevention:   assistive device/personal items within reach   clutter free environment maintained   fall prevention program maintained   lighting adjusted   nonskid shoes/slippers when out of bed   safety round/check completed  Taken 1/22/2024 0300 by Dedra Vazquez RN  Safety Promotion/Fall Prevention:   assistive device/personal items within reach   clutter free environment maintained   fall prevention program maintained   lighting adjusted   nonskid shoes/slippers when out of bed   safety round/check completed  Taken 1/22/2024 0200 by Dedra Vazquez RN  Safety Promotion/Fall Prevention:   assistive device/personal items within reach   clutter free environment maintained   fall prevention program maintained   lighting adjusted   nonskid shoes/slippers when out of bed   safety round/check completed  Taken  1/22/2024 0100 by Dedra Vazquez RN  Safety Promotion/Fall Prevention:   assistive device/personal items within reach   clutter free environment maintained   fall prevention program maintained   lighting adjusted   nonskid shoes/slippers when out of bed   safety round/check completed  Taken 1/22/2024 0000 by Dedra Vazquez RN  Safety Promotion/Fall Prevention:   assistive device/personal items within reach   clutter free environment maintained   fall prevention program maintained   lighting adjusted   nonskid shoes/slippers when out of bed   safety round/check completed  Taken 1/21/2024 2300 by Dedra Vazquez RN  Safety Promotion/Fall Prevention:   assistive device/personal items within reach   clutter free environment maintained   fall prevention program maintained   lighting adjusted   nonskid shoes/slippers when out of bed   safety round/check completed  Taken 1/21/2024 2200 by Dedra Vazquez RN  Safety Promotion/Fall Prevention:   assistive device/personal items within reach   clutter free environment maintained   fall prevention program maintained   lighting adjusted   nonskid shoes/slippers when out of bed   safety round/check completed  Taken 1/21/2024 2110 by Dedra Vazquez RN  Safety Promotion/Fall Prevention:   assistive device/personal items within reach   clutter free environment maintained   fall prevention program maintained   lighting adjusted   nonskid shoes/slippers when out of bed   safety round/check completed  Taken 1/21/2024 2000 by Dedra Vazquez RN  Safety Promotion/Fall Prevention:   activity supervised   assistive device/personal items within reach   clutter free environment maintained   fall prevention program maintained   lighting adjusted   nonskid shoes/slippers when out of bed   safety round/check completed  Intervention: Prevent Skin Injury  Recent Flowsheet Documentation  Taken 1/22/2024 0600 by Dedra Vazquez RN  Body Position: position changed independently  Taken 1/22/2024 0400 by  Dedra Vazquez RN  Body Position: position changed independently  Taken 1/22/2024 0200 by Dedra Vazquez RN  Body Position: position changed independently  Taken 1/22/2024 0000 by Dedra Vazquez RN  Body Position: position changed independently  Taken 1/21/2024 2200 by Dedra Vazquez RN  Body Position: position changed independently  Taken 1/21/2024 2000 by Dedra Vazquez RN  Body Position: position changed independently  Intervention: Prevent and Manage VTE (Venous Thromboembolism) Risk  Recent Flowsheet Documentation  Taken 1/22/2024 0400 by Dedra Vazquez RN  Activity Management: up ad areli  Taken 1/22/2024 0000 by Dedra Vazquez RN  Activity Management: up ad areli  Taken 1/21/2024 2000 by Dedra Vazquez RN  Activity Management: up ad areli  VTE Prevention/Management:   bilateral   sequential compression devices on  Range of Motion: active ROM (range of motion) encouraged  Intervention: Prevent Infection  Recent Flowsheet Documentation  Taken 1/22/2024 0600 by Dedra Vazquez RN  Infection Prevention:   environmental surveillance performed   hand hygiene promoted   personal protective equipment utilized   single patient room provided   rest/sleep promoted  Taken 1/22/2024 0500 by Dedra Vazquez RN  Infection Prevention:   environmental surveillance performed   hand hygiene promoted   personal protective equipment utilized   single patient room provided   rest/sleep promoted  Taken 1/22/2024 0400 by Dedra Vazquez RN  Infection Prevention:   environmental surveillance performed   hand hygiene promoted   personal protective equipment utilized   single patient room provided   rest/sleep promoted  Taken 1/22/2024 0300 by Dedra Vazquez RN  Infection Prevention:   environmental surveillance performed   hand hygiene promoted   personal protective equipment utilized   single patient room provided   rest/sleep promoted  Taken 1/22/2024 0200 by Dedra Vazquez RN  Infection Prevention:   environmental surveillance performed    hand hygiene promoted   personal protective equipment utilized   single patient room provided   rest/sleep promoted  Taken 1/22/2024 0100 by Dedra Vazquez RN  Infection Prevention:   environmental surveillance performed   hand hygiene promoted   personal protective equipment utilized   single patient room provided   rest/sleep promoted  Taken 1/22/2024 0000 by Dedra Vazquez RN  Infection Prevention:   environmental surveillance performed   hand hygiene promoted   personal protective equipment utilized   single patient room provided   rest/sleep promoted  Taken 1/21/2024 2300 by Dedra Vazquez RN  Infection Prevention:   environmental surveillance performed   hand hygiene promoted   personal protective equipment utilized   single patient room provided   rest/sleep promoted  Taken 1/21/2024 2200 by Dedra Vazquez RN  Infection Prevention:   environmental surveillance performed   hand hygiene promoted   personal protective equipment utilized   single patient room provided   rest/sleep promoted  Taken 1/21/2024 2110 by Dedra Vazquez RN  Infection Prevention:   environmental surveillance performed   hand hygiene promoted   personal protective equipment utilized   single patient room provided   rest/sleep promoted  Taken 1/21/2024 2000 by Dedra Vazquez RN  Infection Prevention:   environmental surveillance performed   equipment surfaces disinfected   hand hygiene promoted   personal protective equipment utilized   rest/sleep promoted   single patient room provided  Goal: Optimal Comfort and Wellbeing  Outcome: Ongoing, Progressing  Intervention: Provide Person-Centered Care  Recent Flowsheet Documentation  Taken 1/21/2024 2000 by Dedra Vazquez RN  Trust Relationship/Rapport:   care explained   reassurance provided  Goal: Readiness for Transition of Care  Outcome: Ongoing, Progressing     Problem: Adjustment to Illness (Stroke, Hemorrhagic)  Goal: Optimal Coping  Outcome: Ongoing, Progressing  Intervention: Support  Psychosocial Response to Stroke  Recent Flowsheet Documentation  Taken 1/21/2024 2000 by Dedra Vazquez RN  Family/Support System Care: support provided     Problem: Bowel Elimination Impaired (Stroke, Hemorrhagic)  Goal: Effective Bowel Elimination  Outcome: Ongoing, Progressing     Problem: Cerebral Tissue Perfusion (Stroke, Hemorrhagic)  Goal: Optimal Cerebral Tissue Perfusion  Outcome: Ongoing, Progressing     Problem: Cognitive Impairment (Stroke, Hemorrhagic)  Goal: Optimal Cognitive Function  Outcome: Ongoing, Progressing     Problem: Communication Impairment (Stroke, Hemorrhagic)  Goal: Effective Communication Skills  Outcome: Ongoing, Progressing     Problem: Functional Ability Impaired (Stroke, Hemorrhagic)  Goal: Optimal Functional Ability  Outcome: Ongoing, Progressing  Intervention: Optimize Functional Ability  Recent Flowsheet Documentation  Taken 1/22/2024 0400 by Dedra Vazquez RN  Activity Management: up ad areli  Taken 1/22/2024 0000 by Dedra Vazquez RN  Activity Management: up ad areli  Taken 1/21/2024 2000 by Dedra Vazquez RN  Activity Management: up ad areli     Problem: Pain (Stroke, Hemorrhagic)  Goal: Acceptable Pain Control  Outcome: Ongoing, Progressing     Problem: Respiratory Compromise (Stroke, Hemorrhagic)  Goal: Effective Oxygenation and Ventilation  Outcome: Ongoing, Progressing  Intervention: Optimize Oxygenation and Ventilation  Recent Flowsheet Documentation  Taken 1/22/2024 0600 by Dedra Vazquez RN  Head of Bed (HOB) Positioning: HOB at 30 degrees  Taken 1/22/2024 0400 by Dedra Vazquez RN  Head of Bed (HOB) Positioning: HOB at 20-30 degrees  Taken 1/22/2024 0200 by Dedra Vazquez RN  Head of Bed (HOB) Positioning: HOB at 20-30 degrees  Taken 1/22/2024 0000 by Dedra Vazquez RN  Head of Bed (HOB) Positioning: HOB at 20-30 degrees  Taken 1/21/2024 2200 by Dedra Vazquez RN  Head of Bed (HOB) Positioning: HOB at 30 degrees  Taken 1/21/2024 2000 by Dedra Vazquez RN  Head of Bed (HOB)  Positioning: HOB at 30-45 degrees     Problem: Sensorimotor Impairment (Stroke, Hemorrhagic)  Goal: Improved Sensorimotor Function  Outcome: Ongoing, Progressing  Intervention: Optimize Range of Motion, Motor Control and Function  Recent Flowsheet Documentation  Taken 1/22/2024 0600 by Dedra Vazquez RN  Positioning/Transfer Devices: pillows  Taken 1/22/2024 0400 by Dedra Vazquez RN  Positioning/Transfer Devices: pillows  Taken 1/22/2024 0200 by Dedra Vazquez RN  Positioning/Transfer Devices: pillows  Taken 1/22/2024 0000 by Dedra Vazquez RN  Positioning/Transfer Devices: pillows  Taken 1/21/2024 2200 by Dedra Vazquez RN  Positioning/Transfer Devices: pillows  Taken 1/21/2024 2000 by Dedra Vazquez RN  Positioning/Transfer Devices: pillows  Range of Motion: active ROM (range of motion) encouraged     Problem: Swallowing Impairment (Stroke, Hemorrhagic)  Goal: Optimal Eating and Swallowing Without Aspiration  Outcome: Ongoing, Progressing     Problem: Urinary Elimination Impaired (Stroke, Hemorrhagic)  Goal: Effective Urinary Elimination  Outcome: Ongoing, Progressing     Problem: Pain Acute  Goal: Acceptable Pain Control and Functional Ability  Outcome: Ongoing, Progressing  Intervention: Prevent or Manage Pain  Recent Flowsheet Documentation  Taken 1/22/2024 0600 by Dedra Vazquez RN  Medication Review/Management: medications reviewed  Taken 1/22/2024 0500 by Dedra Vazquez RN  Medication Review/Management: medications reviewed  Taken 1/22/2024 0400 by Dedra Vazquez RN  Medication Review/Management: medications reviewed  Taken 1/22/2024 0300 by Dedra Vazquez RN  Medication Review/Management: medications reviewed  Taken 1/22/2024 0200 by Dedra Vazquez RN  Medication Review/Management: medications reviewed  Taken 1/22/2024 0100 by Dedra Vazquez RN  Medication Review/Management: medications reviewed  Taken 1/22/2024 0000 by Dedra Vazquez RN  Medication Review/Management: medications reviewed  Taken 1/21/2024  2300 by Dedra Vazquez RN  Medication Review/Management: medications reviewed  Taken 1/21/2024 2200 by Dedra Vazquez RN  Medication Review/Management: medications reviewed  Taken 1/21/2024 2110 by Dedra Vazquez RN  Medication Review/Management: medications reviewed  Taken 1/21/2024 2000 by Dedra Vazquez RN  Medication Review/Management: medications reviewed  Intervention: Optimize Psychosocial Wellbeing  Recent Flowsheet Documentation  Taken 1/21/2024 2000 by Dedra Vazquez RN  Diversional Activities:   smartphone   television     Problem: Skin Injury Risk Increased  Goal: Skin Health and Integrity  Outcome: Ongoing, Progressing  Intervention: Optimize Skin Protection  Recent Flowsheet Documentation  Taken 1/22/2024 0600 by Dedra Vazquez RN  Head of Bed (HOB) Positioning: HOB at 30 degrees  Taken 1/22/2024 0400 by Dedra Vazquez RN  Head of Bed (HOB) Positioning: HOB at 20-30 degrees  Taken 1/22/2024 0200 by Dedra Vazquez RN  Head of Bed (HOB) Positioning: HOB at 20-30 degrees  Taken 1/22/2024 0000 by Dedra Vazquez RN  Head of Bed (HOB) Positioning: HOB at 20-30 degrees  Taken 1/21/2024 2200 by Dedra Vazquez RN  Head of Bed (HOB) Positioning: HOB at 30 degrees  Taken 1/21/2024 2000 by Dedra Vazquez RN  Head of Bed (HOB) Positioning: HOB at 30-45 degrees

## 2024-01-23 LAB
ANION GAP SERPL CALCULATED.3IONS-SCNC: 12.8 MMOL/L (ref 5–15)
BACTERIA UR QL AUTO: ABNORMAL /HPF
BILIRUB UR QL STRIP: NEGATIVE
BUN SERPL-MCNC: 6 MG/DL (ref 6–20)
BUN/CREAT SERPL: 11.1 (ref 7–25)
CALCIUM SPEC-SCNC: 9 MG/DL (ref 8.6–10.5)
CHLORIDE SERPL-SCNC: 102 MMOL/L (ref 98–107)
CLARITY UR: CLEAR
CO2 SERPL-SCNC: 22.2 MMOL/L (ref 22–29)
COLOR UR: YELLOW
CREAT SERPL-MCNC: 0.54 MG/DL (ref 0.57–1)
DEPRECATED RDW RBC AUTO: 39.5 FL (ref 37–54)
EGFRCR SERPLBLD CKD-EPI 2021: 106.2 ML/MIN/1.73
ERYTHROCYTE [DISTWIDTH] IN BLOOD BY AUTOMATED COUNT: 12.9 % (ref 12.3–15.4)
GLUCOSE SERPL-MCNC: 115 MG/DL (ref 65–99)
GLUCOSE UR STRIP-MCNC: NEGATIVE MG/DL
HCT VFR BLD AUTO: 33.1 % (ref 34–46.6)
HGB BLD-MCNC: 11.1 G/DL (ref 12–15.9)
HGB UR QL STRIP.AUTO: NEGATIVE
HYALINE CASTS UR QL AUTO: ABNORMAL /LPF
KETONES UR QL STRIP: NEGATIVE
LEUKOCYTE ESTERASE UR QL STRIP.AUTO: ABNORMAL
MCH RBC QN AUTO: 28.8 PG (ref 26.6–33)
MCHC RBC AUTO-ENTMCNC: 33.5 G/DL (ref 31.5–35.7)
MCV RBC AUTO: 85.8 FL (ref 79–97)
NITRITE UR QL STRIP: NEGATIVE
PH UR STRIP.AUTO: 6 [PH] (ref 5–8)
PLATELET # BLD AUTO: 469 10*3/MM3 (ref 140–450)
PMV BLD AUTO: 9.1 FL (ref 6–12)
POTASSIUM SERPL-SCNC: 3.9 MMOL/L (ref 3.5–5.2)
PROCALCITONIN SERPL-MCNC: 0.02 NG/ML (ref 0–0.25)
PROT UR QL STRIP: NEGATIVE
RBC # BLD AUTO: 3.86 10*6/MM3 (ref 3.77–5.28)
RBC # UR STRIP: ABNORMAL /HPF
REF LAB TEST METHOD: ABNORMAL
SODIUM SERPL-SCNC: 137 MMOL/L (ref 136–145)
SP GR UR STRIP: 1.02 (ref 1–1.03)
SQUAMOUS #/AREA URNS HPF: ABNORMAL /HPF
UROBILINOGEN UR QL STRIP: ABNORMAL
WBC # UR STRIP: ABNORMAL /HPF
WBC NRBC COR # BLD AUTO: 13.07 10*3/MM3 (ref 3.4–10.8)

## 2024-01-23 PROCEDURE — 81001 URINALYSIS AUTO W/SCOPE: CPT | Performed by: INTERNAL MEDICINE

## 2024-01-23 PROCEDURE — 87040 BLOOD CULTURE FOR BACTERIA: CPT | Performed by: INTERNAL MEDICINE

## 2024-01-23 PROCEDURE — 84145 PROCALCITONIN (PCT): CPT | Performed by: INTERNAL MEDICINE

## 2024-01-23 PROCEDURE — 25810000003 SODIUM CHLORIDE 0.9 % SOLUTION

## 2024-01-23 PROCEDURE — 97110 THERAPEUTIC EXERCISES: CPT

## 2024-01-23 PROCEDURE — 25010000002 PHENYLEPHRINE 10 MG/ML SOLUTION

## 2024-01-23 PROCEDURE — 85027 COMPLETE CBC AUTOMATED: CPT | Performed by: INTERNAL MEDICINE

## 2024-01-23 PROCEDURE — 25010000002 ENOXAPARIN PER 10 MG: Performed by: NURSE PRACTITIONER

## 2024-01-23 PROCEDURE — 80048 BASIC METABOLIC PNL TOTAL CA: CPT | Performed by: INTERNAL MEDICINE

## 2024-01-23 RX ORDER — NIMODIPINE 30 MG/1
30 CAPSULE, LIQUID FILLED ORAL
Status: DISCONTINUED | OUTPATIENT
Start: 2024-01-23 | End: 2024-01-26 | Stop reason: HOSPADM

## 2024-01-23 RX ORDER — MIDODRINE HYDROCHLORIDE 5 MG/1
5 TABLET ORAL EVERY 8 HOURS
Status: DISCONTINUED | OUTPATIENT
Start: 2024-01-23 | End: 2024-01-26 | Stop reason: HOSPADM

## 2024-01-23 RX ADMIN — NIMODIPINE 30 MG: 30 CAPSULE, LIQUID FILLED ORAL at 20:30

## 2024-01-23 RX ADMIN — NIMODIPINE 30 MG: 30 CAPSULE, LIQUID FILLED ORAL at 22:31

## 2024-01-23 RX ADMIN — Medication 10 ML: at 20:36

## 2024-01-23 RX ADMIN — Medication 10 ML: at 08:10

## 2024-01-23 RX ADMIN — ATORVASTATIN CALCIUM 20 MG: 20 TABLET, FILM COATED ORAL at 20:30

## 2024-01-23 RX ADMIN — NIMODIPINE 30 MG: 30 CAPSULE, LIQUID FILLED ORAL at 18:12

## 2024-01-23 RX ADMIN — MIDODRINE HYDROCHLORIDE 5 MG: 5 TABLET ORAL at 20:30

## 2024-01-23 RX ADMIN — NIMODIPINE 60 MG: 30 CAPSULE, LIQUID FILLED ORAL at 03:41

## 2024-01-23 RX ADMIN — NIMODIPINE 60 MG: 30 CAPSULE, LIQUID FILLED ORAL at 08:09

## 2024-01-23 RX ADMIN — ACETAMINOPHEN 650 MG: 325 TABLET, FILM COATED ORAL at 05:42

## 2024-01-23 RX ADMIN — NIMODIPINE 30 MG: 30 CAPSULE, LIQUID FILLED ORAL at 12:28

## 2024-01-23 RX ADMIN — Medication 10 ML: at 08:11

## 2024-01-23 RX ADMIN — DOCUSATE SODIUM 50MG AND SENNOSIDES 8.6MG 2 TABLET: 8.6; 5 TABLET, FILM COATED ORAL at 20:30

## 2024-01-23 RX ADMIN — ASPIRIN 81 MG: 81 TABLET, COATED ORAL at 08:13

## 2024-01-23 RX ADMIN — LEVETIRACETAM 500 MG: 500 TABLET, FILM COATED ORAL at 08:09

## 2024-01-23 RX ADMIN — MIDODRINE HYDROCHLORIDE 5 MG: 5 TABLET ORAL at 12:28

## 2024-01-23 RX ADMIN — PHENYLEPHRINE HYDROCHLORIDE 0.8 MCG/KG/MIN: 50 INJECTION INTRAVENOUS at 03:40

## 2024-01-23 RX ADMIN — SODIUM CHLORIDE 100 ML/HR: 4.5 INJECTION, SOLUTION INTRAVENOUS at 16:59

## 2024-01-23 RX ADMIN — NIMODIPINE 30 MG: 30 CAPSULE, LIQUID FILLED ORAL at 16:04

## 2024-01-23 RX ADMIN — ENOXAPARIN SODIUM 40 MG: 100 INJECTION SUBCUTANEOUS at 11:59

## 2024-01-23 RX ADMIN — ACETAMINOPHEN 650 MG: 325 TABLET, FILM COATED ORAL at 10:22

## 2024-01-23 RX ADMIN — LEVETIRACETAM 500 MG: 500 TABLET, FILM COATED ORAL at 20:30

## 2024-01-23 NOTE — PROGRESS NOTES
[facility] INPATIENT PROGRESS NOTE         [facility] INTENSIVE CARE    2024      PATIENT IDENTIFICATION:  Name: Binta Morales ADMIT: 2024   : 1965  PCP: Alma Delia Rudolph PA-C    MRN: [id number] LOS: 7 days   AGE/SEX: 58 y.o. female  ROOM: [facility]                     LOS 7    Reason for visit: Subarachnoid hemorrhage due to ACOM aneurysm      SUBJECTIVE:      On phenylephrine drip for goal blood pressure 120s to 160s.  No new complaints overnight.      Objective   OBJECTIVE:    Vital Sign Min/Max for last 24 hours  Temp  Min: 97.4 °F (36.3 °C)  Max: 101.3 °F (38.5 °C)   BP  Min: 97/39  Max: 153/73   Pulse  Min: 46  Max: 96   No data recorded   SpO2  Min: 87 %  Max: 100 %   No data recorded   No data recorded    Vitals:    24 1116 24 1130 24 1145 24 1200   BP: 153/73 134/76 133/75 132/69   Pulse: (!) 48 (!) 49 62 58   Resp:       Temp:       TempSrc:       SpO2: 97% 99% 95% 93%   Weight:       Height:                24  0545 24  0500   Weight: 68.9 kg (151 lb 14.4 oz) 65.7 kg (144 lb 13.5 oz)       Body mass index is 24.1 kg/m².                          Body mass index is 24.1 kg/m².    Intake/Output Summary (Last 24 hours) at 2024 1210  Last data filed at 2024 0611  Gross per 24 hour   Intake 7632.44 ml   Output --   Net 7632.44 ml         Exam:  GEN:  No distress, appears stated age  EYES:   PERRL, anicteric sclerae  ENT:    External ears/nose normal, OP clear  NECK:  No adenopathy, midline trachea  LUNGS: Normal chest on inspection, palpation and auscultation  CV:  Normal S1S2, without murmur  ABD:  Nontender, nondistended, no hepatosplenomegaly, +BS  EXT:  No edema.  No cyanosis or clubbing.  No mottling and normal cap refill.    Assessment     Scheduled meds:  aspirin, 81 mg, Oral, Daily  atorvastatin, 20 mg, Oral, Nightly  enoxaparin, 40 mg, Subcutaneous, Q24H  levETIRAcetam, 500 mg, Oral, Q12H  midodrine, 5 mg, Oral, Q8H  niMODipine, 30  "mg, Oral, Q2H  senna-docusate sodium, 2 tablet, Oral, BID  sodium chloride, 10 mL, Intravenous, Q12H  sodium chloride, 10 mL, Intravenous, Q12H  sodium chloride, 10 mL, Intravenous, Q12H      IV meds:                      hold, 1 each  phenylephrine, 0.5-3 mcg/kg/min, Last Rate: 1 mcg/kg/min (01/23/24 1134)  sodium chloride, 100 mL/hr, Last Rate: 100 mL/hr (01/23/24 0611)      Data Review:  Results from last 7 days   Lab Units 01/23/24  0347 01/22/24  0437 01/21/24  0441 01/20/24  0450 01/19/24  0417   SODIUM mmol/L 137 135* 134* 141 137   POTASSIUM mmol/L 3.9 3.8 3.9 3.7 4.0   CHLORIDE mmol/L 102 102 101 104 103   CO2 mmol/L 22.2 21.6* 21.0* 22.0 20.4*   BUN mg/dL 6 7 12 13 15   CREATININE mg/dL 0.54* 0.45* 0.60 0.57 0.51*   GLUCOSE mg/dL 115* 110* 117* 103* 119*   CALCIUM mg/dL 9.0 8.7 9.3 9.1 9.3         Estimated Creatinine Clearance: 116.3 mL/min (A) (by C-G formula based on SCr of 0.54 mg/dL (L)).  Results from last 7 days   Lab Units 01/23/24  0347 01/22/24  0437 01/21/24  0441 01/20/24  0450 01/19/24  0417   WBC 10*3/mm3 13.07* 14.46* 13.98* 10.32 11.98*   HEMOGLOBIN g/dL 11.1* 11.3* 12.9 13.2 12.5   PLATELETS 10*3/mm3 469* 469* 477* 338 295     Results from last 7 days   Lab Units 01/16/24  1253   INR  1.09               Results from last 7 days   Lab Units 01/23/24  1038 01/17/24  2124   PROCALCITONIN ng/mL 0.02 0.22   LACTATE mmol/L  --  1.0         No results found for: \"HGBA1C\", \"POCGLU\"        Imaging reviewed  Repeat CT head 1/20 reviewed: Slightly less conspicuous small amount of bilateral subarachnoid hemorrhage.    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 coli UTI      PLAN:  Neurochecks per protocol.  Dopplers to evaluate vasospasm per neurosurgery recommendations.  On Nimotop.  Pressors as needed to keep blood pressure in required goal of 120-160.  Unable to come off coni and adding midodrine.  Completed antibiotic for UTI coverage.  Keppra for seizure prophylaxis.  Control glucose.    Control blood pressure.  Mechanical DVT prophylaxis.  Discussed with nursing staff at bedside.    Discussed with multidisciplinary ICU team on rounds this morning.          Joshua Wilson MD  Pulmonary and Critical Care Medicine  [facility], [facility]  1/23/2024    12:10 EST

## 2024-01-23 NOTE — PAYOR COMM NOTE
"Trav Morales (58 y.o. Female)                            ATTENTION; CONTINUED CLINICALS CASE REF [id number]                          REPLY TO [facility] DEPT  [id number] OR CALL   STEPHEN DIMAS LPLIO           Date of Birth   1965    Social Security Number       Address   [address] [address]    Home Phone   [phone]    MRN   [id number]       Yazdanism   Taoist    Marital Status                               Admission Date   1/16/24    Admission Type   Emergency    Admitting Provider   Chris Levine MD    Attending Provider   Chris Levine MD    Department, Room/Bed   [facility] INTENSIVE CARE, I388/1       Discharge Date       Discharge Disposition       Discharge Destination                                 Attending Provider: Chris Levine MD    Allergies: Augmentin [Amoxicillin-pot Clavulanate], Latex, Sulfa Antibiotics    Isolation: None   Infection: None   Code Status: CPR    Ht: 165.1 cm (65\")   Wt: 65.7 kg (144 lb 13.5 oz)    Admission Cmt: None   Principal Problem: SAH (subarachnoid hemorrhage) [I60.9]                   Active Insurance as of 1/16/2024       Primary Coverage       Payor Plan Insurance Group Employer/Plan Group    CIGNA CIGNA [id number]       Payor Plan Address Payor Plan Phone Number Payor Plan Fax Number Effective Dates    [address] [phone]  1/1/2020 - None Entered    [facility] [id number]         Subscriber Name Subscriber Birth Date Member ID       TRAV MORALES 1965 [id number]                     Emergency Contacts        (Rel.) Home Phone Work Phone Mobile Phone    REJI Morales (Spouse) [phone] -- --              Vital Signs (last 3 days)       Date/Time Temp Temp src Pulse BP SpO2    01/23/24 1116 -- -- 48 153/73 97    01/23/24 1115 -- -- 48 153/73 95    01/23/24 1100 -- -- 52 146/78 94    01/23/24 1045 -- -- 59 135/77 94    01/23/24 1030 -- -- 61 119/61 98    01/23/24 1015 -- -- 59 " 138/65 95    01/23/24 1000 -- -- 64 128/63 96    01/23/24 0945 -- -- 75 121/63 91    01/23/24 0930 -- -- 62 113/61 94    01/23/24 0919 -- -- 73 122/64 95    01/23/24 0916 -- -- 52 100/79 97    01/23/24 0901 -- -- 71 98/51 95    01/23/24 0900 -- -- 90 98/51 89    01/23/24 0845 -- -- 71 104/51 94    01/23/24 0830 -- -- 66 120/63 95    01/23/24 0815 -- -- 63 133/70 95    01/23/24 0800 -- -- 61 138/76 94    01/23/24 0745 -- -- 71 145/99 96    01/23/24 0730 98.5 (36.9) Oral 55 125/70 92    01/23/24 0715 -- -- 56 133/76 91    01/23/24 0700 -- -- 62 145/70 92    01/23/24 0645 -- -- 69 129/65 93    01/23/24 0630 -- -- 96 141/81 93    01/23/24 0600 -- -- 67 143/70 92    01/23/24 0530 -- -- 75 132/63 94    01/23/24 0500 -- -- 70 133/65 95    01/23/24 0430 -- -- 90 129/62 94    01/23/24 0421 100.7 (38.2) Oral -- -- --    01/23/24 0400 -- -- 71 152/74 94    01/23/24 0005 98.4 (36.9) Oral -- -- --    01/22/24 2100 -- -- 75 112/56 95    01/22/24 2000 97.4 (36.3) -- 67 142/69 94    01/22/24 1900 -- -- 46 148/67 94    01/22/24 1845 -- -- 50 127/59 94    01/22/24 1830 -- -- 59 127/71 94    01/22/24 1815 -- -- 58 134/75 92    01/22/24 1800 -- -- 63 121/63 94    01/22/24 1745 -- -- 61 131/63 93    01/22/24 1730 -- -- 79 121/63 94    01/22/24 1716 -- -- 73 105/59 93    01/22/24 1715 98.5 (36.9) Oral 72 105/59 93    01/22/24 1700 -- -- 84 97/39  93    BP: increased neosynephrine at 01/22/24 1700    01/22/24 1645 -- -- 79 130/74 100    01/22/24 1630 101.3 (38.5) Oral 74 140/62 97    01/22/24 1615 -- -- 61 130/58 97    01/22/24 1600 -- -- 73 127/56 96    01/22/24 1545 -- -- 75 123/58 87    01/22/24 1530 -- -- 71 132/59 97    01/22/24 1515 -- -- 67 130/64 98    01/22/24 1500 -- -- 65 126/63 99    01/22/24 1445 -- -- 70 118/58 100    01/22/24 1430 -- -- 68 114/69 88    01/22/24 1415 -- -- 72 119/62 99    01/22/24 1400 -- -- 79 118/64 92    01/22/24 1315 -- -- 65 145/70 99    01/22/24 1300 -- -- 65 148/73 99    01/22/24 1245 -- -- 66 148/79  99    01/22/24 1230 -- -- 71 148/77 100    01/22/24 1215 -- -- 70 147/73 100    01/22/24 1200 -- -- 77 137/76 99    01/22/24 1151 -- -- 75 135/73 --    01/22/24 1145 -- -- 79 135/73 100    01/22/24 1130 -- -- 79 129/73 98    01/22/24 1115 -- -- 76 145/76 94    01/22/24 1102 99.1 (37.3) Oral -- -- --    01/22/24 1100 -- -- 62 160/83 99    01/22/24 1049 -- -- 68 -- --    01/22/24 1045 -- -- 62 159/76 98    01/22/24 1030 -- -- 68 144/65 99    01/22/24 1015 -- -- 49 135/60 96    01/22/24 1000 -- -- 64 133/66 92    01/22/24 0953 -- -- 58 109/59  98    BP: increased neosynephrine at 01/22/24 0953    01/22/24 0945 -- -- 54 109/59 95    01/22/24 0930 -- -- 56 122/57 96    01/22/24 0915 -- -- 51 119/63 96    01/22/24 0900 -- -- 53 123/64 97    01/22/24 0851 -- -- 60 116/56 --    01/22/24 0845 -- -- 72 116/56  95    BP: increased neosynephrine at 01/22/24 0845    01/22/24 0830 -- -- 53 122/63 96    01/22/24 0815 -- -- 65 133/69 100    01/22/24 0800 -- -- 63 134/73 93    01/22/24 0758 -- -- 66 137/77 --    01/22/24 0739 98 (36.7) Oral -- -- --    01/22/24 0730 -- -- 51 117/68 95    01/22/24 0715 -- -- 50 123/67 95    01/22/24 0700 -- -- 53 125/75 95    01/22/24 0630 -- -- 73 136/71 93    01/22/24 0600 -- -- 73 141/73 92    01/22/24 0530 -- -- 79 139/65 93    01/22/24 0500 -- -- 76 126/64 95    01/22/24 0456 100.8 (38.2) Oral -- -- --    01/22/24 0400 -- -- 73 159/76 97    01/22/24 0300 -- -- 48 148/69 97    01/22/24 0200 -- -- 46 134/72 96    01/22/24 0130 -- -- 59 134/64 95    01/22/24 0100 -- -- 60 135/70 94    01/22/24 0000 -- -- 66 117/72 92    01/21/24 2333 98.6 (37) Oral -- -- --    01/21/24 2300 -- -- 75 132/72 94    01/21/24 2230 -- -- 68 145/72 95    01/21/24 2200 -- -- 97 143/67 96    01/21/24 2130 -- -- 75 150/92 95    01/21/24 2100 -- -- 75 141/71 95    01/21/24 2030 -- -- 69 124/59 94    01/21/24 2014 98.8 (37.1) Oral -- -- --    01/21/24 2000 -- -- 72 158/76 97    01/21/24 1930 -- -- 67 156/76 98    01/21/24 1900  -- -- 68 158/80 97    01/21/24 1816 -- -- 75 149/77 96    01/21/24 1801 -- -- 77 131/72 97    01/21/24 1746 -- -- 76 135/67 99    01/21/24 1731 -- -- 81 135/68 98    01/21/24 1646 -- -- 77 154/74 96    01/21/24 1601 -- -- 71 140/71 98    01/21/24 1445 -- -- 64 138/90 --    01/21/24 1227 -- -- 75 128/70 --    01/21/24 1201 -- -- 69 129/68 94    01/21/24 1146 -- -- 70 135/67 96    01/21/24 1031 -- -- 77 132/68 96    01/21/24 1016 -- -- 72 144/69 96    01/21/24 1001 -- -- 74 149/75 97    01/21/24 0946 -- -- 73 140/71 98    01/21/24 0931 -- -- 79 142/77 99    01/21/24 0916 -- -- 86 154/69 99    01/21/24 0901 -- -- 68 140/75 96    01/21/24 0853 -- -- 97 119/60 95    01/21/24 0846 -- -- 87 102/48 93    01/21/24 0841 -- -- 74 125/53 97    01/21/24 0831 -- -- 92 124/60 92    01/21/24 0816 -- -- 94 129/73 95    01/21/24 0801 -- -- 86 138/76 --    01/21/24 0746 -- -- 78 157/78 97    01/21/24 0744 -- -- 72 140/79 95    01/21/24 0733 -- -- 66 152/77 --    01/21/24 0700 -- -- 68 143/75 99    01/21/24 0630 -- -- 56 150/64 97    01/21/24 0600 -- -- 69 135/75 98    01/21/24 0545 -- -- 69 125/73 100    01/21/24 0500 99.2 (37.3) Oral 75 130/65 98    01/21/24 0445 -- -- 77 145/85 93    01/21/24 0430 -- -- 71 161/79 98    01/21/24 0400 -- -- 49 149/61 100    01/21/24 0345 -- -- 51 139/64 98    01/21/24 0330 -- -- 51 141/68 100    01/21/24 0315 -- -- 86 130/66 100    01/21/24 0300 -- -- 62 127/79 100    01/21/24 0230 -- -- 58 126/80 97    01/21/24 0200 -- -- 58 125/73 97    01/21/24 0130 -- -- 56 123/72 98    01/21/24 0115 -- -- 59 124/69 97    01/21/24 0100 -- -- 61 122/69 97    01/21/24 0045 -- -- 65 123/69 98    01/21/24 0030 -- -- 57 118/69 96    01/21/24 0015 -- -- 64 105/59 93    01/21/24 0000 98.9 (37.2) Axillary 63 108/68 100    01/20/24 2300 -- -- 89 125/82 97    01/20/24 2200 -- -- 70 125/72 94    01/20/24 2100 -- -- 87 125/63 95    01/20/24 2000 101.3 (38.5) Oral 85 137/78 95    01/20/24 1900 -- -- 85 139/71 96    01/20/24  1700 -- -- 92 137/74 95    01/20/24 1600 -- -- 84 133/82 99    01/20/24 1500 99.9 (37.7) Oral 84 129/69 97    01/20/24 1400 -- -- 96 123/64 98    01/20/24 1300 -- -- 99 136/77 95    01/20/24 1220 99.1 (37.3) Oral -- -- --    01/20/24 1212 -- -- 100 139/84 --    01/20/24 1200 -- -- 94 139/84 94    01/20/24 1100 -- -- 90 131/76 93    01/20/24 1000 -- -- 85 136/72 96    01/20/24 0900 -- -- 80 120/61 97    01/20/24 0800 99.5 (37.5) Oral 89 146/85 96    01/20/24 0700 -- -- 64 132/66 97    01/20/24 0600 -- -- 61 130/72 97    01/20/24 0500 -- -- 79 135/71 97    01/20/24 0400 100.2 (37.9) Oral 65 102/46 98    01/20/24 0300 -- -- 81 124/81 99    01/20/24 0200 -- -- 69 110/62 95    01/20/24 0100 -- -- 69 107/80 93    01/20/24 0055 97.8 (36.6) Oral -- -- --    01/20/24 0000 -- -- 73 115/67 93          Oxygen Therapy (last 3 days)       Date/Time SpO2 Device (Oxygen Therapy) Flow (L/min) Oxygen Concentration (%) ETCO2 (mmHg)    01/23/24 1116 97 -- -- -- --    01/23/24 1115 95 -- -- -- --    01/23/24 1100 94 -- -- -- --    01/23/24 1045 94 -- -- -- --    01/23/24 1030 98 -- -- -- --    01/23/24 1015 95 -- -- -- --    01/23/24 1000 96 -- -- -- --    01/23/24 0945 91 -- -- -- --    01/23/24 0930 94 -- -- -- --    01/23/24 0919 95 -- -- -- --    01/23/24 0916 97 -- -- -- --    01/23/24 0901 95 -- -- -- --    01/23/24 0900 89 -- -- -- --    01/23/24 0845 94 -- -- -- --    01/23/24 0830 95 -- -- -- --    01/23/24 0815 95 -- -- -- --    01/23/24 0800 94 -- -- -- --    01/23/24 0745 96 -- -- -- --    01/23/24 0730 92 -- -- -- --    01/23/24 0715 91 -- -- -- --    01/23/24 0700 92 -- -- -- --    01/23/24 0645 93 -- -- -- --    01/23/24 0630 93 -- -- -- --    01/23/24 0600 92 -- -- -- --    01/23/24 0530 94 -- -- -- --    01/23/24 0500 95 -- -- -- --    01/23/24 0430 94 -- -- -- --    01/23/24 0400 94 -- -- -- --    01/22/24 2100 95 -- -- -- --    01/22/24 2000 94 -- -- -- --    01/22/24 1900 94 -- -- -- --    01/22/24 1845 94 -- -- --  --    01/22/24 1830 94 -- -- -- --    01/22/24 1815 92 -- -- -- --    01/22/24 1800 94 -- -- -- --    01/22/24 1745 93 -- -- -- --    01/22/24 1730 94 -- -- -- --    01/22/24 1716 93 -- -- -- --    01/22/24 1715 93 -- -- -- --    01/22/24 1700 93 -- -- -- --    01/22/24 1645 100 -- -- -- --    01/22/24 1630 97 -- -- -- --    01/22/24 1615 97 -- -- -- --    01/22/24 1600 96 -- -- -- --    01/22/24 1545 87 -- -- -- --    01/22/24 1530 97 -- -- -- --    01/22/24 1515 98 -- -- -- --    01/22/24 1500 99 -- -- -- --    01/22/24 1445 100 -- -- -- --    01/22/24 1430 88 -- -- -- --    01/22/24 1415 99 -- -- -- --    01/22/24 1400 92 -- -- -- --    01/22/24 1315 99 -- -- -- --    01/22/24 1300 99 -- -- -- --    01/22/24 1245 99 -- -- -- --    01/22/24 1230 100 -- -- -- --    01/22/24 1215 100 -- -- -- --    01/22/24 1200 99 -- -- -- --    01/22/24 1145 100 -- -- -- --    01/22/24 1130 98 -- -- -- --    01/22/24 1115 94 -- -- -- --    01/22/24 1100 99 -- -- -- --    01/22/24 1045 98 -- -- -- --    01/22/24 1030 99 -- -- -- --    01/22/24 1015 96 -- -- -- --    01/22/24 1000 92 -- -- -- --    01/22/24 0953 98 -- -- -- --    01/22/24 0945 95 -- -- -- --    01/22/24 0930 96 -- -- -- --    01/22/24 0915 96 -- -- -- --    01/22/24 0900 97 -- -- -- --    01/22/24 0845 95 -- -- -- --    01/22/24 0830 96 -- -- -- --    01/22/24 0815 100 -- -- -- --    01/22/24 0800 93 -- -- -- --    01/22/24 0730 95 -- -- -- --    01/22/24 0715 95 -- -- -- --    01/22/24 0700 95 -- -- -- --    01/22/24 0630 93 -- -- -- --    01/22/24 0600 92 -- -- -- --    01/22/24 0530 93 -- -- -- --    01/22/24 0500 95 -- -- -- --    01/22/24 0400 97 -- -- -- --    01/22/24 0300 97 -- -- -- --    01/22/24 0200 96 -- -- -- --    01/22/24 0130 95 -- -- -- --    01/22/24 0100 94 -- -- -- --    01/22/24 0000 92 room air -- -- --    01/21/24 2300 94 -- -- -- --    01/21/24 2230 95 -- -- -- --    01/21/24 2200 96 -- -- -- --    01/21/24 2130 95 -- -- -- --    01/21/24  2100 95 -- -- -- --    01/21/24 2030 94 -- -- -- --    01/21/24 2000 97 room air -- -- --    01/21/24 1930 98 -- -- -- --    01/21/24 1900 97 -- -- -- --    01/21/24 1816 96 -- -- -- --    01/21/24 1801 97 -- -- -- --    01/21/24 1746 99 -- -- -- --    01/21/24 1731 98 -- -- -- --    01/21/24 1646 96 -- -- -- --    01/21/24 1601 98 -- -- -- --    01/21/24 1201 94 -- -- -- --    01/21/24 1146 96 -- -- -- --    01/21/24 1031 96 -- -- -- --    01/21/24 1016 96 -- -- -- --    01/21/24 1001 97 -- -- -- --    01/21/24 0946 98 -- -- -- --    01/21/24 0931 99 -- -- -- --    01/21/24 0916 99 -- -- -- --    01/21/24 0901 96 -- -- -- --    01/21/24 0853 95 -- -- -- --    01/21/24 0846 93 -- -- -- --    01/21/24 0841 97 -- -- -- --    01/21/24 0831 92 -- -- -- --    01/21/24 0816 95 -- -- -- --    01/21/24 0800 -- room air -- -- --    01/21/24 0746 97 -- -- -- --    01/21/24 0744 95 -- -- -- --    01/21/24 0700 99 -- -- -- --    01/21/24 0630 97 -- -- -- --    01/21/24 0600 98 -- -- -- --    01/21/24 0545 100 -- -- -- --    01/21/24 0500 98 -- -- -- --    01/21/24 0445 93 -- -- -- --    01/21/24 0430 98 -- -- -- --    01/21/24 0400 100 -- -- -- --    01/21/24 0345 98 -- -- -- --    01/21/24 0330 100 -- -- -- --    01/21/24 0315 100 -- -- -- --    01/21/24 0300 100 -- -- -- --    01/21/24 0230 97 -- -- -- --    01/21/24 0200 97 -- -- -- --    01/21/24 0130 98 -- -- -- --    01/21/24 0115 97 -- -- -- --    01/21/24 0100 97 -- -- -- --    01/21/24 0045 98 -- -- -- --    01/21/24 0030 96 -- -- -- --    01/21/24 0015 93 -- -- -- --    01/21/24 0000 100 -- -- -- --    01/20/24 2300 97 -- -- -- --    01/20/24 2200 94 -- -- -- --    01/20/24 2100 95 -- -- -- --    01/20/24 2000 95 -- -- -- --    01/20/24 1900 96 -- -- -- --    01/20/24 1700 95 -- -- -- --    01/20/24 1600 99 -- -- -- --    01/20/24 1500 97 -- -- -- --    01/20/24 1400 98 -- -- -- --    01/20/24 1300 95 -- -- -- --    01/20/24 1200 94 -- -- -- --    01/20/24 1100 93 --  -- -- --    01/20/24 1000 96 -- -- -- --    01/20/24 0900 97 -- -- -- --    01/20/24 0800 96 -- -- -- --    01/20/24 0700 97 -- -- -- --    01/20/24 0600 97 -- -- -- --    01/20/24 0500 97 -- -- -- --    01/20/24 0400 98 -- -- -- --    01/20/24 0300 99 -- -- -- --    01/20/24 0200 95 -- -- -- --    01/20/24 0100 93 -- -- -- --    01/20/24 0000 93 room air -- -- --          Orders (last 72 hrs)        Start     Ordered    01/23/24 1200  midodrine (PROAMATINE) tablet 5 mg  Every 8 Hours         01/23/24 1110    01/23/24 1115  niMODipine (NIMOTOP) capsule 30 mg  Every 2 Hours        Note to Pharmacy: 1st dose STAT    01/23/24 1106    01/23/24 0936  Blood Culture - Blood,  Once        See Hyperspace for full Linked Orders Report.    01/23/24 0935    01/23/24 0936  Blood Culture - Blood, Blood, Central Line  Once        See Hyperspace for full Linked Orders Report.    01/23/24 0935    01/23/24 0935  Procalcitonin  STAT         01/23/24 0935    01/23/24 0935  Urinalysis With Culture If Indicated -  Once         01/23/24 0935    01/22/24 2100  levETIRAcetam (KEPPRA) tablet 500 mg  Every 12 Hours Scheduled        Note to Pharmacy: For tube route administration disperse crushed tablets in 10 mL of water, shake for 5 minutes to dissolve, and administer immediately via enteral feeding tube.    01/22/24 1052    01/22/24 1145  Enoxaparin Sodium (LOVENOX) syringe 40 mg  Every 24 Hours         01/22/24 1050    01/22/24 1035  diphenhydrAMINE (BENADRYL) capsule 25 mg  Every 6 Hours PRN         01/22/24 1036    01/22/24 1026  Pharmacy to Dose enoxaparin (LOVENOX)  Continuous PRN,   Status:  Discontinued         01/22/24 1026    01/21/24 1600  Flush All PICC Lumens Every 8 Hours & After Each Intermittent Use When Not Continuously Running IV Infusion; Use 10mL NS  Every 8 Hours        Comments: Must Use a 10mL Syringe or Larger for All Flushes & IV Push Medications.  Flush, Remove Syringe, Then Clamp Using Positive Pressure Valve     01/21/24 1400    01/21/24 1445  sodium chloride 0.9 % flush 10 mL  Every 12 Hours Scheduled         01/21/24 1359    01/21/24 1445  sodium chloride 0.9 % flush 10 mL  Every 12 Hours Scheduled         01/21/24 1359    01/21/24 1401  No Venipuncture or BP in PICC Arm  Continuous         01/21/24 1400    01/21/24 1401  May Draw From PICC  Continuous         01/21/24 1400    01/21/24 1401  Use 3CG Technology For Placement Verification (PICC Nurse)  Once         01/21/24 1400    01/21/24 1400  PICC LINE CARE - Change Transparent Dressing With CHG Disk & Securement Device Every 7 Days  Weekly        Comments: Per [facility] Policy    01/21/24 1359    01/21/24 1400  PICC LINE CARE - Connectors / Hubs Must Be Scrubbed 15 Seconds Using 70% Alcohol & Allowed to Dry Before Accessing Line  Continuous         01/21/24 1359    01/21/24 1400  PICC LINE CARE - Change Needleless Connectors  Per Order Details        Comments: Per [facility] Policy    01/21/24 1359    01/21/24 1400  Catheter Care PICC  Per Order Details        Comments: 1) Follow PICC Protocol For Care  2) No Blood Pressure in Arm With PICC  3) Warm Packs to PICC Arm As Needed For Discomfort  4) Measure & Record Arm Circumference if Patient Experiences Pain, Swelling, Redness or Warmth in PICC Arm; Compare Measurement to Initial Measure, Report to Provider if Greater Than 3cm Difference  5) Follow Flush Protocol Per Facility    01/21/24 1400    01/21/24 1400  Ensure PICC Positive Pressure Cap is In Place  Per Order Details        Comments: Change Every 3 Days & As Needed For Evidence of Infusate or Blood in Cap    01/21/24 1400    01/21/24 1359  sodium chloride 0.9 % flush 10 mL  As Needed         01/21/24 1359    01/21/24 1359  sodium chloride 0.9 % flush 20 mL  As Needed         01/21/24 1359    01/21/24 0906  Inpatient IV Team Consult PICC 2 Lumens  Once        Provider:  (Not yet assigned)    01/21/24 0905    01/21/24 0906  RN To Release PICC Line Care Orders Once Line in  Place  Once         01/21/24 0905    01/21/24 0030  phenylephrine (BOOGIE-SYNEPHRINE) 50 mg in 250 mL NS infusion  Titrated         01/20/24 2340    01/20/24 1301  Notify Provider  Until Discontinued         01/20/24 1300    01/20/24 1300  sodium chloride 0.45 % infusion  Continuous         01/20/24 1211    01/20/24 1211  Keep SBP between 130-140.  Please add pressors if needed to meet this goal  Physician Communication Order  Once        Comments: Keep SBP between 130-140.  Please add pressors if needed to meet this goal    01/20/24 1211    01/20/24 1208  Inpatient Neurology Consult Stroke  Once        Specialty:  Neurology  Provider:  Lux George MD    01/20/24 1211    01/19/24 2100  amoxicillin-clavulanate (AUGMENTIN) 875-125 MG per tablet 1 tablet  Every 12 Hours Scheduled,   Status:  Discontinued         01/19/24 1447    01/18/24 1200  Vital Signs - Every 4 Hours  Every 4 Hours       01/18/24 1137    01/18/24 0951  Hold medication  Continuous PRN         01/18/24 0952    01/17/24 2205  Incentive Spirometry  Every 4 Hours While Awake       01/17/24 2204    01/17/24 1032  Transcranial Doppler Ultrasound  Daily      Comments: Daily until discontinued    01/17/24 1031    01/17/24 0600  CBC (No Diff)  Daily       01/16/24 1532    01/17/24 0600  Basic Metabolic Panel  Daily       01/16/24 1532    01/16/24 2130  atorvastatin (LIPITOR) tablet 20 mg  Nightly         01/16/24 1524    01/16/24 2100  levETIRAcetam (KEPPRA) injection 500 mg  Every 12 Hours Scheduled,   Status:  Discontinued         01/16/24 1532    01/16/24 2100  sodium chloride 0.9 % flush 10 mL  Every 12 Hours Scheduled         01/16/24 1532    01/16/24 2100  sennosides-docusate (PERICOLACE) 8.6-50 MG per tablet 2 tablet  2 Times Daily        See Hyperspace for full Linked Orders Report.    01/16/24 1532    01/16/24 1831  aspirin EC tablet 81 mg  Daily         01/16/24 1829    01/16/24 1829  NIHSS Assessment on arrival to unit for all stroke  patients  Every Shift      Comments: Turn off all sedation medications prior to performing assessment. Assessment to be performed upon admission, transfer to another unit, discharge, and with neurological decline. If NIHSS change is greater than or equal to 4 and/or neurological decline is noted notify physician.    01/16/24 1829    01/16/24 1600  Neuro Checks  Every 2 Hours       01/16/24 1532    01/16/24 1600  Vital Signs Every Hour and Per Hospital Policy Based on Patient Condition  Every Hour       01/16/24 1532    01/16/24 1600  Intake & Output  Every Hour       01/16/24 1532    01/16/24 1533  Daily Weights  Daily       01/16/24 1532    01/16/24 1532  acetaminophen (TYLENOL) tablet 650 mg  Every 4 Hours PRN        See Hyperspace for full Linked Orders Report.    01/16/24 1532    01/16/24 1532  acetaminophen (TYLENOL) suppository 325 mg  Every 4 Hours PRN        See Hyperspace for full Linked Orders Report.    01/16/24 1532    01/16/24 1532  Potassium Replacement - Follow Nurse / BPA Driven Protocol  As Needed         01/16/24 1532    01/16/24 1532  Magnesium Standard Dose Replacement - Follow Nurse / BPA Driven Protocol  As Needed         01/16/24 1532    01/16/24 1532  Phosphorus Replacement - Follow Nurse / BPA Driven Protocol  As Needed         01/16/24 1532    01/16/24 1532  Calcium Replacement - Follow Nurse / BPA Driven Protocol  As Needed         01/16/24 1532    01/16/24 1532  Oral Care - Patient Not on NPPV & Not Intubated  Every Shift       01/16/24 1532    01/16/24 1531  bisacodyl (DULCOLAX) EC tablet 5 mg  Daily PRN        See Hyperspace for full Linked Orders Report.    01/16/24 1532    01/16/24 1531  bisacodyl (DULCOLAX) suppository 10 mg  Daily PRN        See Hyperspace for full Linked Orders Report.    01/16/24 1532    01/16/24 1531  nitroglycerin (NITROSTAT) SL tablet 0.4 mg  Every 5 Minutes PRN         01/16/24 1532    01/16/24 1531  sodium chloride 0.9 % flush 10 mL  As Needed          01/16/24 1532    01/16/24 1531  sodium chloride 0.9 % infusion 40 mL  As Needed         01/16/24 1532    01/16/24 1531  polyethylene glycol (MIRALAX) packet 17 g  Daily PRN        See Hyperspace for full Linked Orders Report.    01/16/24 1532    01/16/24 1531  ondansetron (ZOFRAN) injection 4 mg  Every 6 Hours PRN         01/16/24 1532    01/16/24 1400  Neuro Checks  Every Hour       01/16/24 1337    01/16/24 1350  niMODipine (NIMOTOP) capsule 60 mg  Every 4 Hours Scheduled,   Status:  Discontinued        Note to Pharmacy: 1st dose STAT    01/16/24 1334    Unscheduled  Oxygen Therapy- Nasal Cannula; Titrate 1-6 LPM Per SpO2; 90 - 95%  Continuous PRN       01/16/24 1829    Unscheduled  Order CT Head Without Contrast for Neurological Decline  As Needed       01/17/24 0828    Unscheduled  Bladder Scan if Patient Unable to Void 4-6 Hours After Catheter Removal  As Needed         01/17/24 1045    Unscheduled  If Bladder Scan Volume is Less Than 500mL & Patient is Without Symptoms of Bladder Discomfort / Distention Monitor Every 1-2 Hours for Spontaneous Void  As Needed       01/17/24 1045    Unscheduled  Straight Cath Every 4-6 Hours As Needed If Patient is Unable to Void After 4-6 Hours, Bladder Scan Volume is Greater Than 500mL & Patient Has Symptoms of Bladder Discomfort / Distention  As Needed       01/17/24 1045    Unscheduled  Schedule / Prompt Voiding For Patients With Urinary Incontinence  As Needed       01/17/24 1045    Unscheduled  PICC LINE CARE - Change Dressing As Needed When Damp, Loose or Soiled  As Needed       01/21/24 1359    Unscheduled  Pulse / Turbulent Flush With 20 mL NS After Each Blood Specimen Obtained From PICC Line  As Needed       01/21/24 1400    Unscheduled  For Sluggish / Occluded Line, Use CATHFLO Activase Per Protocol (IV Nurse or Oncology Nurse With Order Only)  As Needed       01/21/24 1400    --  amLODIPine (NORVASC) 5 MG tablet  Nightly         01/16/24 1321    --   lisinopril-hydrochlorothiazide (PRINZIDE,ZESTORETIC) 20-25 MG per tablet  Nightly         24 1321    --  cetirizine (zyrTEC) 5 MG tablet  Daily         24 1321    --  vitamin B-12 (CYANOCOBALAMIN) 100 MCG tablet  Daily         24 1321    --  Cholecalciferol 25 MCG (1000 UT) tablet  Daily         24 1321    --  SCANNED - IMAGING         24 0000    --  SCANNED - IMAGING         24 0000    --  SCANNED - IMAGING         24 0000    --  SCANNED - IMAGING         24 0000    --  SCANNED - IMAGING         24 0000    --  SCANNED - IMAGING         24 0000                     Physician Progress Notes (last 72 hours)        Joshua Wilson MD at 24 1045              [facility] [facility] INPATIENT PROGRESS NOTE         [facility] INTENSIVE CARE    2024      PATIENT IDENTIFICATION:  Name: Binta Morales ADMIT: 2024   : 1965  PCP: Alma Delia Rudolph PA-C    MRN: [id number] LOS: 6 days   AGE/SEX: 58 y.o. female  ROOM: [facility]                     LOS 6    Reason for visit: Subarachnoid hemorrhage due to ACOM aneurysm      SUBJECTIVE:      On phenylephrine drip for goal blood pressure 120s to 160s.  No new complaints overnight.      Objective   OBJECTIVE:    Vital Sign Min/Max for last 24 hours  Temp  Min: 98 °F (36.7 °C)  Max: 100.8 °F (38.2 °C)   BP  Min: 109/59  Max: 159/76   Pulse  Min: 46  Max: 97   No data recorded   SpO2  Min: 92 %  Max: 100 %   No data recorded   No data recorded    Vitals:    24 0953 24 1000 24 1015 24 1030   BP: 109/59 133/66 135/60 144/65   Pulse: 58 64 (!) 49 68   Resp:       Temp:       TempSrc:       SpO2: 98% 92% 96% 99%   Weight:       Height:                24  0545 24  0500   Weight: 68.9 kg (151 lb 14.4 oz) 65.7 kg (144 lb 13.5 oz)       Body mass index is 24.1 kg/m².                          Body mass index is 24.1 kg/m².    Intake/Output Summary (Last 24 hours) at 2024  1045  Last data filed at 1/21/2024 1800  Gross per 24 hour   Intake 1892.79 ml   Output --   Net 1892.79 ml         Exam:  GEN:  No distress, appears stated age  EYES:   PERRL, anicteric sclerae  ENT:    External ears/nose normal, OP clear  NECK:  No adenopathy, midline trachea  LUNGS: Normal chest on inspection, palpation and auscultation  CV:  Normal S1S2, without murmur  ABD:  Nontender, nondistended, no hepatosplenomegaly, +BS  EXT:  No edema.  No cyanosis or clubbing.  No mottling and normal cap refill.    Assessment     Scheduled meds:  aspirin, 81 mg, Oral, Daily  atorvastatin, 20 mg, Oral, Nightly  levETIRAcetam, 500 mg, Intravenous, Q12H  niMODipine, 60 mg, Oral, Q4H  senna-docusate sodium, 2 tablet, Oral, BID  sodium chloride, 10 mL, Intravenous, Q12H  sodium chloride, 10 mL, Intravenous, Q12H  sodium chloride, 10 mL, Intravenous, Q12H      IV meds:                      hold, 1 each  Pharmacy to Dose enoxaparin (LOVENOX),   phenylephrine, 0.5-3 mcg/kg/min, Last Rate: 1.6 mcg/kg/min (01/22/24 0953)  sodium chloride, 100 mL/hr, Last Rate: 100 mL/hr (01/22/24 1013)      Data Review:  Results from last 7 days   Lab Units 01/22/24  0437 01/21/24  0441 01/20/24  0450 01/19/24  0417 01/18/24  0527   SODIUM mmol/L 135* 134* 141 137 140   POTASSIUM mmol/L 3.8 3.9 3.7 4.0 4.2   CHLORIDE mmol/L 102 101 104 103 106   CO2 mmol/L 21.6* 21.0* 22.0 20.4* 22.0   BUN mg/dL 7 12 13 15 15   CREATININE mg/dL 0.45* 0.60 0.57 0.51* 0.76   GLUCOSE mg/dL 110* 117* 103* 119* 124*   CALCIUM mg/dL 8.7 9.3 9.1 9.3 9.9         Estimated Creatinine Clearance: 139.6 mL/min (A) (by C-G formula based on SCr of 0.45 mg/dL (L)).  Results from last 7 days   Lab Units 01/22/24  0437 01/21/24  0441 01/20/24  0450 01/19/24  0417 01/18/24  0527   WBC 10*3/mm3 14.46* 13.98* 10.32 11.98* 14.91*   HEMOGLOBIN g/dL 11.3* 12.9 13.2 12.5 13.7   PLATELETS 10*3/mm3 469* 477* 338 295 317     Results from last 7 days   Lab Units 01/16/24  1253   INR  1.09  "    Results from last 7 days   Lab Units 01/16/24  1138   ALT (SGPT) U/L 39*   AST (SGOT) U/L 17         Results from last 7 days   Lab Units 01/17/24  2124   PROCALCITONIN ng/mL 0.22   LACTATE mmol/L 1.0         No results found for: \"HGBA1C\", \"POCGLU\"        Imaging reviewed  Repeat CT head 1/20 reviewed: Slightly less conspicuous small amount of bilateral subarachnoid hemorrhage.    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 coli UTI      PLAN:  Neurochecks per protocol.  Dopplers to evaluate vasospasm per neurosurgery recommendations.  On Nimotop.  Pressors as needed to keep blood pressure in required goal of 120-160.  Completed antibiotic for UTI coverage.  Keppra for seizure prophylaxis.  Control glucose.    Control blood pressure.  Mechanical DVT prophylaxis.  Discussed with nursing staff at bedside.    Discussed with multidisciplinary ICU team on rounds this morning.      CCT: 31 min    Joshua Wilson MD  Pulmonary and Critical Care Medicine  [facility], [facility]  1/22/2024    10:45 EST       Electronically signed by Joshua Wilson MD at 01/22/24 1050       Alma Delia Gutierrez APRN at 01/22/24 0802            [facility] NEUROSURGERY INTRACRANIAL PROGRESS NOTE    PATIENT IDENTIFICATION:   Name:  Binta Morales      MRN:  [id number]     58 y.o.  female               CC: SAH secondary to ruptured ACOM aneurysm postprocedure day 6 for embolization and successful web placement. spouse present at bedside. "       Subjective     Interval History: no new complaints or events overnight. still with intermittent headache.     ROS:  Constitutional: No fever, chills  HEENT: + headache, no vision changes, no tinnitus, no hearing difficulties  Neck: no stiffness  GI: No nausea, vomiting, no swallow difficulties  Neuro: No numbness, tingling, or weakness, no speech difficulties, no balance difficulties    Objective     Vital signs in last 24 hours:  Temp:  [98 °F (36.7 °C)-100.8 °F (38.2 °C)] 98 °F (36.7 °C)  Heart Rate:  [46-97] 53  BP: (116-159)/(56-92) 123/64    Intake/Output this shift:  No intake/output data recorded.    Intake/Output last 3 shifts:  I/O last 3 completed shifts:  In: 4003.5 [P.O.:680; I.V.:3323.5]  Out: -     LABS:  Results from last 7 days   Lab Units 01/22/24  0437 01/21/24  0441 01/20/24  0450   WBC 10*3/mm3 14.46* 13.98* 10.32   HEMOGLOBIN g/dL 11.3* 12.9 13.2   HEMATOCRIT % 34.1 37.6 38.3   PLATELETS 10*3/mm3 469* 477* 338       Results from last 7 days   Lab Units 01/22/24  0437 01/21/24  0441 01/20/24  0450   SODIUM mmol/L 135* 134* 141   POTASSIUM mmol/L 3.8 3.9 3.7   CHLORIDE mmol/L 102 101 104   CO2 mmol/L 21.6* 21.0* 22.0   BUN mg/dL 7 12 13   CREATININE mg/dL 0.45* 0.60 0.57   GLUCOSE mg/dL 110* 117* 103*   CALCIUM mg/dL 8.7 9.3 9.1          IMAGING STUDIES:  No radiology results for the last day      I personally viewed and interpreted the patient's chart.    Meds reviewed    Physical exam  Alert, awake, oriented x3  Pupils equal round reactive to light  Extraocular muscles intact  Face symmetric  Speech is fluent and clear  No pronator drift  Motor exam  Bilateral deltoids 5/5, bilateral biceps 5/5, bilateral triceps 5/5, bilateral wrist extension 5/5 bilateral hand  5/5  Bilateral hip flexion 5/5, bilateral knee extension 5/5, bilateral DF/PF 5/5  gait deferred  Able to detect light touch in all 4 extremities  right orbital ecchymosis   Right groin site soft, flat, no hematoma, pedal  "pulses present    Assessment & Plan     ASSESSMENT:      SAH (subarachnoid hemorrhage)    Ruptured aneurysm of anterior communicating artery    Communicating hydrocephalus    58-year-old female with SAH secondary to ruptured ACOM aneurysm postprocedure day 6 for embolization and successful web placement    PLAN:     Continue to monitor in ICU, can hopefully transfer to the floor in the next day or so  Neurochecks q2h  Nimodipine x 21 days  Keppra  SBP goal 100-180 mmHg  Daily TCD's  Okay for DVT prophylaxis     I discussed the patient's findings and my recommendations with Dr. Duncan, patient, family, nursing staff, and primary care team    I spent 35 minutes caring for Binta Morales on this date of service. This time includes time spent by me in the following activities: preparing for the visit, reviewing tests, obtaining and/or reviewing a separately obtained history, performing a medically appropriate examination and/or evaluation, counseling and educating the patient/family/caregiver, ordering medications, tests, or procedures, referring and communicating with other health care professionals, documenting information in the medical record, independently interpreting results and communicating that information with the patient/family/caregiver, and care coordination          LOS: 6 days       RENNY Maldonado  2024  09:43 EST    \"Dictated utilizing Dragon dictation\".        Electronically signed by Alma Delia Gutierrez APRN at 24 1031       Lux George MD at 24 1244          DOS: 2024  NAME: Binta Morales   : 1965  PCP: Alma Delia Rudolph PA-C  Chief Complaint   Patient presents with    Syncope    Head Injury       Chief complaint: subarachnoid hemorrhage  Subjective: no significant neurologic change    Objective:  Vital signs: /70   Pulse 75   Temp 99.2 °F (37.3 °C) (Oral)   Resp 15   Ht 165.1 cm (65\")   Wt 65.7 kg (144 lb 13.5 oz)   SpO2 94%   BMI " 24.10 kg/m²    Gen: NAD, vitals reviewed  MS: oriented x3, recent/remote memory intact, normal attention/concentration, language intact, no neglect.  CN: visual acuity grossly normal, PERRL, EOMI, no facial droop, no dysarthria  Motor: 5/5 throughout upper and lower extremities, normal tone  Sensory: intact to light touch all 4 ext.    Laboratory results:  Lab Results   Component Value Date    GLUCOSE 117 (H) 01/21/2024    CALCIUM 9.3 01/21/2024     (L) 01/21/2024    K 3.9 01/21/2024    CO2 21.0 (L) 01/21/2024     01/21/2024    BUN 12 01/21/2024    CREATININE 0.60 01/21/2024    EGFRIFAFRI >60 02/09/2023    BCR 20.0 01/21/2024    ANIONGAP 12.0 01/21/2024     Lab Results   Component Value Date    WBC 13.98 (H) 01/21/2024    HGB 12.9 01/21/2024    HCT 37.6 01/21/2024    MCV 84.9 01/21/2024     (H) 01/21/2024     Lab Results   Component Value Date    LDL 80 01/18/2024     (H) 09/23/2020    LDL 89 12/19/2018         Lab 01/18/24  0527   HEMOGLOBIN A1C 5.80*        Review of labs: Sodium 134, WBC 14, platelets 477    Diagnoses:  Vasospasm, asymptomatic  Aneurysmal subarachnoid hemorrhage  Anterior communicating artery aneurysm status post repair    Comment: Vasospasm remains asymptomatic    Plan:  1.  Continue neurochecks  2.  Daily TCD's  3.  BP goal 120-160, IV fluids  4.  Nimodipine 60 every 4  5.  Keppra 500 every 12    Management discussed with nursing staff, Dr. Wilson and Dr. Gamble. Will plan to defer further care to neurosurgery after the weekend.      Electronically signed by Lux George MD at 01/21/24 1246       Jeremy Quiroz IV, MD at 01/21/24 1046          No events.  Some worsening of headache overnight but tolerable.     Awake and alert  Extraocular movements intact  No drift  Moving all extremities     Plan: 58-year-old female status post coiling of ruptured ACOM aneurysm  -Neuro stable; no evidence of symptomatic vasospasm  -Continue SAH protocol  -ICU  care    Electronically signed by Jeremy Quiroz IV, MD at 24 1047       Joshua Wilson MD at 24 0904              [facility] INPATIENT PROGRESS NOTE         [facility] INTENSIVE CARE    2024      PATIENT IDENTIFICATION:  Name: Binta Morales ADMIT: 2024   : 1965  PCP: Alma Delia Rudolph PA-C    MRN: [id number] LOS: 5 days   AGE/SEX: 58 y.o. female  ROOM: [facility]                     LOS 5    Reason for visit: Subarachnoid hemorrhage due to ACOM aneurysm      SUBJECTIVE:      On phenylephrine drip for goal blood pressure 120s to 160s.  Discussed with Dr. George.  Discussed with nursing staff.  Place PICC line.      Objective   OBJECTIVE:    Vital Sign Min/Max for last 24 hours  Temp  Min: 98.9 °F (37.2 °C)  Max: 101.3 °F (38.5 °C)   BP  Min: 102/48  Max: 161/79   Pulse  Min: 49  Max: 100   No data recorded   SpO2  Min: 92 %  Max: 100 %   No data recorded   No data recorded    Vitals:    24 0946 24 1001 24 1016 24 1031   BP: 140/71 149/75 144/69 132/68   Pulse: 73 74 72 77   Resp:       Temp:       TempSrc:       SpO2: 98% 97% 96% 96%   Weight:       Height:                24  0545 24  0500   Weight: 68.9 kg (151 lb 14.4 oz) 65.7 kg (144 lb 13.5 oz)       Body mass index is 24.1 kg/m².                          Body mass index is 24.1 kg/m².    Intake/Output Summary (Last 24 hours) at 2024 1059  Last data filed at 2024 0600  Gross per 24 hour   Intake 2650.47 ml   Output --   Net 2650.47 ml         Exam:  GEN:  No distress, appears stated age  EYES:   PERRL, anicteric sclerae  ENT:    External ears/nose normal, OP clear  NECK:  No adenopathy, midline trachea  LUNGS: Normal chest on inspection, palpation and auscultation  CV:  Normal S1S2, without murmur  ABD:  Nontender, nondistended, no hepatosplenomegaly, +BS  EXT:  No edema.  No cyanosis or clubbing.  No mottling and normal cap refill.    Assessment     Scheduled meds:   "amoxicillin-clavulanate, 1 tablet, Oral, Q12H  aspirin, 81 mg, Oral, Daily  atorvastatin, 20 mg, Oral, Nightly  levETIRAcetam, 500 mg, Intravenous, Q12H  niMODipine, 60 mg, Oral, Q4H  senna-docusate sodium, 2 tablet, Oral, BID  sodium chloride, 10 mL, Intravenous, Q12H      IV meds:                      hold, 1 each  phenylephrine, 0.5-3 mcg/kg/min, Last Rate: 1 mcg/kg/min (01/21/24 0854)  sodium chloride, 100 mL/hr, Last Rate: 100 mL/hr (01/21/24 0731)      Data Review:  Results from last 7 days   Lab Units 01/21/24 0441 01/20/24 0450 01/19/24 0417 01/18/24  0527 01/17/24  0611   SODIUM mmol/L 134* 141 137 140 136   POTASSIUM mmol/L 3.9 3.7 4.0 4.2 2.7*   CHLORIDE mmol/L 101 104 103 106 102   CO2 mmol/L 21.0* 22.0 20.4* 22.0 20.0*   BUN mg/dL 12 13 15 15 13   CREATININE mg/dL 0.60 0.57 0.51* 0.76 0.63   GLUCOSE mg/dL 117* 103* 119* 124* 122*   CALCIUM mg/dL 9.3 9.1 9.3 9.9 8.8         Estimated Creatinine Clearance: 104.7 mL/min (by C-G formula based on SCr of 0.6 mg/dL).  Results from last 7 days   Lab Units 01/21/24  0441 01/20/24 0450 01/19/24 0417 01/18/24  0527 01/17/24  0611   WBC 10*3/mm3 13.98* 10.32 11.98* 14.91* 11.33*   HEMOGLOBIN g/dL 12.9 13.2 12.5 13.7 12.9   PLATELETS 10*3/mm3 477* 338 295 317 164     Results from last 7 days   Lab Units 01/16/24  1253   INR  1.09     Results from last 7 days   Lab Units 01/16/24  1138   ALT (SGPT) U/L 39*   AST (SGOT) U/L 17         Results from last 7 days   Lab Units 01/17/24  2124   PROCALCITONIN ng/mL 0.22   LACTATE mmol/L 1.0         No results found for: \"HGBA1C\", \"POCGLU\"        Imaging reviewed  Repeat CT head 1/20 reviewed: Slightly less conspicuous small amount of bilateral subarachnoid hemorrhage.    Microbiology reviewed           Active Hospital Problems    Diagnosis  POA    **SAH (subarachnoid hemorrhage) [I60.9]  Yes    Ruptured aneurysm of anterior communicating artery [I60.2]  Yes    Communicating hydrocephalus [G91.0]  Yes      Resolved " Hospital Problems   No resolved problems to display.         ASSESSMENT:  ACOM aneurysm status post rupture and repair  Subarachnoid hemorrhage secondary to above  Hypoxemia mainly while asleep with sats of 88% on room air while asleep  Hypertension by history  Aspiration pneumonia  E. coli UTI      PLAN:  Neurochecks per protocol.  Dopplers to evaluate vasospasm per neurosurgery recommendations.  On Nimotop.  Pressors as needed to keep blood pressure in required goal of 120-160.  PICC line.  Continue antibiotic for aspiration pneumonia/UTI coverage.  Keppra for seizure prophylaxis.  Control glucose.    Control blood pressure.  Mechanical DVT prophylaxis.  Discussed with nursing staff at bedside.        CCT: 32 min    Joshua Wilson MD  Pulmonary and Critical Care Medicine  [facility], [facility]  1/21/2024    10:59 EST       Electronically signed by Joshua Wilson MD at 01/21/24 1101          Consult Notes (last 72 hours)        Lux George MD at 01/20/24 1254        Consult Orders    1. Inpatient Neurology Consult Stroke [[id number]] ordered by Angelito Gamble MD at 01/20/24 1211                 Neurology Consult Note    Consult Date: 1/20/2024    Referring MD: Chris Levine MD    Reason for Consult I have been asked to see the patient in neurological consultation to render advice and opinion regarding vasospasm    Binta Morales is a 58 y.o. female with no previous medical history who presented to the hospital on 1/16 with sudden onset of acute headache and nausea and vomiting.  She then fainted and struck the right orbit against a sink on the way down to the ground.  The onset was Friday 1/12 and she spent the weekend at home feeling extremely ill with severe headache.  She came to the hospital and was found to have aneurysmal subarachnoid hemorrhage.  She had a ruptured anterior communicating artery aneurysm.  This was embolized by Dr. Casillas with a web device.    Postoperatively  "she has done pretty well.  She currently denies headache but does endorse generalized weakness and fatigue.  She has intermittently been febrile overnight.  Blood pressure was a little low yesterday evening as low as 100 systolic.  Today her daily TCD showed evidence of severe vasospasm.  I was consulted regarding whether there were any changes on neurologic exam.    Past Medical History:   Diagnosis Date    Hypertension        Exam  /84   Pulse 100   Temp 99.1 °F (37.3 °C) (Oral)   Resp 15   Ht 165.1 cm (65\")   Wt 65.7 kg (144 lb 13.5 oz)   SpO2 93%   BMI 24.10 kg/m²   Gen: NAD, vitals reviewed  MS: oriented x3, recent/remote memory intact, normal attention/concentration, language intact, no neglect.  CN: visual acuity grossly normal, PERRL, EOMI, no facial droop, no dysarthria  Motor: 5/5 throughout upper extremities, 4+/5 bilateral lower extremities, normal tone throughout  Sensory: Intact to cold temperature throughout    DATA:    Lab Results   Component Value Date    GLUCOSE 103 (H) 01/20/2024    CALCIUM 9.1 01/20/2024     01/20/2024    K 3.7 01/20/2024    CO2 22.0 01/20/2024     01/20/2024    BUN 13 01/20/2024    CREATININE 0.57 01/20/2024    EGFRIFAFRI >60 02/09/2023    BCR 22.8 01/20/2024    ANIONGAP 15.0 01/20/2024     Lab Results   Component Value Date    WBC 10.32 01/20/2024    HGB 13.2 01/20/2024    HCT 38.3 01/20/2024    MCV 84.2 01/20/2024     01/20/2024       Lab review: CBC, BMP unremarkable    Imaging review:  I personally reviewed her CT head performed today which shows stable hydrocephalus, resolving subarachnoid hemorrhage.  No evidence of rebleeding    Diagnoses:  Vasospasm, asymptomatic  Aneurysmal subarachnoid hemorrhage  Anterior communicating artery aneurysm status post repair  Fever, likely central    Comment: 58-year-old recovering from aneurysmal SAH status post web embolization of an ACOM aneurysm.  She is doing well clinically with no new symptoms.  She " does have severe vasospasm identified on TCD but no clinical evidence of delayed cerebral ischemia.    PLAN:  Continue close monitoring in the ICU  Continue daily TCD's  Blood pressure goal 120-160. IV fluids. Ok to use pressor if needed to maintain target range  Continue Nimodipine 60 q4  Keppra 500 q12    Management discussed with Dr. Gamble    Electronically signed by Lux George MD at 01/20/24 2117

## 2024-01-23 NOTE — PROGRESS NOTES
[facility] NEUROSURGERY INTRACRANIAL PROGRESS NOTE    PATIENT IDENTIFICATION:   Name:  Binta Morales      MRN:  [id number]     58 y.o.  female               CC: SAH secondary to ruptured ACOM aneurysm postprocedure day 7 for embolization and successful web placement. spouse present at bedside.       Subjective     Interval History: No new complaints or events overnight.    ROS:  Constitutional: No fever, chills  HEENT: no headache, no vision changes, no tinnitus, no hearing difficulties  Neck: no stiffness  GI: No nausea, vomiting, no swallow difficulties  Neuro: No numbness, tingling, or weakness, no speech difficulties, no balance difficulties    Objective     Vital signs in last 24 hours:  Temp:  [97.4 °F (36.3 °C)-100.7 °F (38.2 °C)] 98.2 °F (36.8 °C)  Heart Rate:  [42-96] 70  BP: ()/(39-99) 126/73    Intake/Output this shift:  No intake/output data recorded.    Intake/Output last 3 shifts:  I/O last 3 completed shifts:  In: 8112.4 [P.O.:480; I.V.:7632.4]  Out: -     LABS:  Results from last 7 days   Lab Units 01/23/24 0347 01/22/24  0437 01/21/24  0441   WBC 10*3/mm3 13.07* 14.46* 13.98*   HEMOGLOBIN g/dL 11.1* 11.3* 12.9   HEMATOCRIT % 33.1* 34.1 37.6   PLATELETS 10*3/mm3 469* 469* 477*       Results from last 7 days   Lab Units 01/23/24 0347 01/22/24  0437 01/21/24  0441   SODIUM mmol/L 137 135* 134*   POTASSIUM mmol/L 3.9 3.8 3.9   CHLORIDE mmol/L 102 102 101   CO2 mmol/L 22.2 21.6* 21.0*   BUN mg/dL 6 7 12   CREATININE mg/dL 0.54* 0.45* 0.60   GLUCOSE mg/dL 115* 110* 117*   CALCIUM mg/dL 9.0 8.7 9.3          IMAGING STUDIES:  No radiology results for the last day      I personally viewed and interpreted the patient's chart.    Meds reviewed    Physical exam  Alert, awake, oriented x3  Pupils equal round reactive to light  Extraocular muscles intact  Face symmetric  Speech is fluent and clear  No pronator drift  Motor exam  Bilateral deltoids 5/5, bilateral biceps 5/5, bilateral triceps 5/5, bilateral  "wrist extension 5/5 bilateral hand  5/5  Bilateral hip flexion 5/5, bilateral knee extension 5/5, bilateral DF/PF 5/5  gait deferred  Able to detect light touch in all 4 extremities  right orbital ecchymosis     Assessment & Plan     ASSESSMENT:      SAH (subarachnoid hemorrhage)    Ruptured aneurysm of anterior communicating artery    Communicating hydrocephalus    58-year-old female with SAH secondary to ruptured ACOM aneurysm postprocedure day 7 for embolization and successful web placement    PLAN:     Continue to monitor in ICU, can hopefully transfer to the floor in the next day or so  Neurochecks q2h  Nimodipine x 21 days, change to q2h as she is hypotensive and bradycardic   Keppra  SBP goal 100-180 mmHg  Daily TCD's- slightly better today  Okay for DVT prophylaxis     I discussed the patient's findings and my recommendations with Dr. Duncan, patient, family, nursing staff, and primary care team    I spent 35 minutes caring for Binta Morales on this date of service. This time includes time spent by me in the following activities: preparing for the visit, reviewing tests, obtaining and/or reviewing a separately obtained history, performing a medically appropriate examination and/or evaluation, counseling and educating the patient/family/caregiver, ordering medications, tests, or procedures, referring and communicating with other health care professionals, documenting information in the medical record, independently interpreting results and communicating that information with the patient/family/caregiver, and care coordination          LOS: 7 days       Alma Delia Gutierrez, APRN  1/23/2024  16:33 EST    \"Dictated utilizing Dragon dictation\".      "

## 2024-01-23 NOTE — THERAPY TREATMENT NOTE
Patient Name: Binta Morales  : 1965    MRN: [id number]                              Today's Date: 2024       Admit Date: 2024    Visit Dx:     ICD-10-CM ICD-9-CM   1. SAH (subarachnoid hemorrhage)  I60.9 430     Patient Active Problem List   Diagnosis    SAH (subarachnoid hemorrhage)    Ruptured aneurysm of anterior communicating artery    Communicating hydrocephalus     Past Medical History:   Diagnosis Date    Hypertension      Past Surgical History:   Procedure Laterality Date    EMBOLIZATION CEREBRAL N/A 2024    Procedure: CEREBRAL ANGIOGRAM WITH EMBOLIZATION;  Surgeon: Angelito Gamble MD;  Location: [facility] OR ;  Service: Interventional Radiology;  Laterality: N/A;      General Information       Row Name 24 1531          Physical Therapy Time and Intention    Document Type therapy note (daily note)  -MS     Mode of Treatment physical therapy  -MS       Row Name 24 1531          General Information    Existing Precautions/Restrictions fall  -MS     Barriers to Rehab none identified  -MS       Row Name 24 153          Cognition    Orientation Status (Cognition) oriented x 4  flat affect noted, soft spoken  -MS       Row Name 24 153          Safety Issues, Functional Mobility    Comment, Safety Issues/Impairments (Mobility) Gait belt and non skid socks donned.  -MS               User Key  (r) = Recorded By, (t) = Taken By, (c) = Cosigned By      Initials Name Provider Type    Juana Reinoso, PT Physical Therapist                   Mobility       Row Name 24 1538          Bed Mobility    Bed Mobility supine-sit  -MS     Supine-Sit [place] (Bed Mobility) standby assist;verbal cues;nonverbal cues (demo/gesture)  -MS     Assistive Device (Bed Mobility) bed rails;head of bed elevated  -MS       Row Name 24 153          Transfers    Comment, (Transfers) Sequencing and hand placement cues.  -MS       Row Name 24 153           Sit-Stand Transfer    Sit-Stand [place] (Transfers) contact guard;verbal cues;nonverbal cues (demo/gesture)  -MS       Row Name 01/23/24 1538          Gait/Stairs (Locomotion)    [place] Level (Gait) contact guard;2 person assist;verbal cues;nonverbal cues (demo/gesture)  -MS     Distance in Feet (Gait) 75  -MS     Deviations/Abnormal Patterns (Gait) clarissa decreased;gait speed decreased  -MS     Bilateral Gait Deviations forward flexed posture  -MS     Comment, (Gait/Stairs) No overt LOB or veering noted. Denies pain or dizziness this date.  -MS               User Key  (r) = Recorded By, (t) = Taken By, (c) = Cosigned By      Initials Name Provider Type    Juana Reinoso, PT Physical Therapist                   Obj/Interventions       Row Name 01/23/24 1540          Motor Skills    Therapeutic Exercise --  Seated LAQs and MIPs x 10 reps  -MS       Row Name 01/23/24 1540          Balance    Static Sitting Balance verbal cues;supervision  -MS     Dynamic Sitting Balance supervision  -MS     Position, Sitting Balance supported;sitting edge of bed  -MS     Static Standing Balance contact guard  -MS     Dynamic Standing Balance contact guard  -MS     Position/Device Used, Standing Balance supported  -MS               User Key  (r) = Recorded By, (t) = Taken By, (c) = Cosigned By      Initials Name Provider Type    Juana Reinoso, PT Physical Therapist                   Goals/Plan    No documentation.                  Clinical Impression       Row Name 01/23/24 1541          Pain    Pretreatment Pain Rating 0/10 - no pain  -MS     Posttreatment Pain Rating 0/10 - no pain  -MS       Row Name 01/23/24 1541          Plan of Care Review    Plan of Care Reviewed With patient  -MS     Outcome Evaluation Patient resting in bed upon PT arrival- seen today in ICU. SBA for bed mobility, stood with CGA for transfers and ambulated 75' CGA without any AD. Patient agreeable to sitting up in recliner post  therapy. PT will continue to follow and advance as able.  -MS       Row Name 01/23/24 1541          Vital Signs    O2 Delivery Pre Treatment room air  -MS       Row Name 01/23/24 1541          Positioning and Restraints    Pre-Treatment Position in bed  -MS     Post Treatment Position chair  -MS     In Chair notified nsg;reclined;call light within reach;encouraged to call for assist  no chair alarm box in room-RN aware  -MS               User Key  (r) = Recorded By, (t) = Taken By, (c) = Cosigned By      Initials Name Provider Type    Juana Reinoso, PT Physical Therapist                   Outcome Measures       Row Name 01/23/24 1545          How much help from another person do you currently need...    Turning from your back to your side while in flat bed without using bedrails? 4  -MS     Moving from lying on back to sitting on the side of a flat bed without bedrails? 4  -MS     Moving to and from a bed to a chair (including a wheelchair)? 4  -MS     Standing up from a chair using your arms (e.g., wheelchair, bedside chair)? 4  -MS     Climbing 3-5 steps with a railing? 3  -MS     To walk in hospital room? 4  -MS     AM-PAC 6 Clicks Score (PT) 23  -MS     Highest Level of Mobility Goal 7 --> Walk 25 feet or more  -MS               User Key  (r) = Recorded By, (t) = Taken By, (c) = Cosigned By      Initials Name Provider Type    Juana Reinoso, PT Physical Therapist                                 Physical Therapy Education       Title: PT OT SLP Therapies (Done)       Topic: Physical Therapy (Done)       Point: Mobility training (Done)       Learning Progress Summary             Patient Acceptance, E, VU by DB at 1/22/2024 1553    Acceptance, TB,E, NR by ST at 1/20/2024 1513    Acceptance, E, VU by ER at 1/19/2024 1128    Acceptance, E,TB, VU by MS at 1/18/2024 1150                         Point: Home exercise program (Done)       Learning Progress Summary             Patient Acceptance, E, VU by DB  at 1/22/2024 1553    Acceptance, E, VU by ER at 1/19/2024 1128    Acceptance, E,TB, VU by MS at 1/18/2024 1150                         Point: Body mechanics (Done)       Learning Progress Summary             Patient Acceptance, E, VU by DB at 1/22/2024 1553    Acceptance, E, VU by ER at 1/19/2024 1128    Acceptance, E,TB, VU by MS at 1/18/2024 1150                         Point: Precautions (Done)       Learning Progress Summary             Patient Acceptance, E, VU by DB at 1/22/2024 1553    Acceptance, TB,E, NR by ST at 1/20/2024 1513    Acceptance, E, VU by ER at 1/19/2024 1128    Acceptance, E,TB, VU by MS at 1/18/2024 1150                                         User Key       Initials Effective Dates Name Provider Type Discipline    MS 06/16/21 -  Juana Kauffman, PT Physical Therapist PT    DB 06/16/21 -  Sierra Thao, PT Physical Therapist PT    ST 09/22/22 -  Shannon Tolliver, PT Physical Therapist PT    ER 10/15/23 -  Bela Forte, PT Physical Therapist PT                  PT Recommendation and Plan  Planned Therapy Interventions (PT): balance training, bed mobility training, gait training, home exercise program, postural re-education, patient/family education, ROM (range of motion), stair training, strengthening, transfer training  Plan of Care Reviewed With: patient  Outcome Evaluation: Patient resting in bed upon PT arrival- seen today in ICU. SBA for bed mobility, stood with CGA for transfers and ambulated 75' CGA without any AD. Patient agreeable to sitting up in recliner post therapy. PT will continue to follow and advance as able.     Time Calculation:         PT Charges       Row Name 01/23/24 1531             Time Calculation    Start Time 1420  -MS      Stop Time 1445  -MS      Time Calculation (min) 25 min  -MS      PT Received On 01/23/24  -MS      PT - Next Appointment 01/24/24  -MS                User Key  (r) = Recorded By, (t) = Taken By, (c) = Cosigned By      Initials Name  Provider Type    MS Juana Kauffman, PT Physical Therapist                  Therapy Charges for Today       Code Description Service Date Service Provider Modifiers Qty    [id number] HC PT THER PROC EA 15 MIN 1/23/2024 Juana Kauffman, PT GP 2            PT G-Codes  Outcome Measure Options: AM-PAC 6 Clicks Basic Mobility (PT)  AM-PAC 6 Clicks Score (PT): 23  AM-PAC 6 Clicks Score (OT): 21  Modified Coke Scale: 2 - Slight disability.  Unable to carry out all previous activities but able to look after own affairs without assistance.  PT Discharge Summary  Anticipated Discharge Disposition (PT): home with home health, skilled nursing facility (pending patient progress made)    Juana Kauffman, ANSLEY  1/23/2024

## 2024-01-23 NOTE — PLAN OF CARE
Goal Outcome Evaluation:  Plan of Care Reviewed With: patient         Outcome Evaluation: Patient resting in bed upon PT arrival- seen today in ICU. SBA for bed mobility, stood with CGA for transfers and ambulated 75' CGA without any AD. Patient agreeable to sitting up in recliner post therapy. PT will continue to follow and advance as able.      Anticipated Discharge Disposition (PT): home with home health, skilled nursing facility (pending patient progress made)

## 2024-01-23 NOTE — PLAN OF CARE
Problem: Adult Inpatient Plan of Care  Goal: Plan of Care Review  Outcome: Ongoing, Progressing  Goal: Patient-Specific Goal (Individualized)  Outcome: Ongoing, Progressing  Goal: Absence of Hospital-Acquired Illness or Injury  Outcome: Ongoing, Progressing  Intervention: Identify and Manage Fall Risk  Recent Flowsheet Documentation  Taken 1/22/2024 2000 by Lalito Acosta RN  Safety Promotion/Fall Prevention:   activity supervised   safety round/check completed  Intervention: Prevent Infection  Recent Flowsheet Documentation  Taken 1/22/2024 2000 by Lalito Acosta RN  Infection Prevention: single patient room provided  Goal: Optimal Comfort and Wellbeing  Outcome: Ongoing, Progressing  Goal: Readiness for Transition of Care  Outcome: Ongoing, Progressing     Problem: Adjustment to Illness (Stroke, Hemorrhagic)  Goal: Optimal Coping  Outcome: Ongoing, Progressing     Problem: Bowel Elimination Impaired (Stroke, Hemorrhagic)  Goal: Effective Bowel Elimination  Outcome: Ongoing, Progressing     Problem: Cerebral Tissue Perfusion (Stroke, Hemorrhagic)  Goal: Optimal Cerebral Tissue Perfusion  Outcome: Ongoing, Progressing     Problem: Cognitive Impairment (Stroke, Hemorrhagic)  Goal: Optimal Cognitive Function  Outcome: Ongoing, Progressing     Problem: Communication Impairment (Stroke, Hemorrhagic)  Goal: Effective Communication Skills  Outcome: Ongoing, Progressing     Problem: Functional Ability Impaired (Stroke, Hemorrhagic)  Goal: Optimal Functional Ability  Outcome: Ongoing, Progressing     Problem: Pain (Stroke, Hemorrhagic)  Goal: Acceptable Pain Control  Outcome: Ongoing, Progressing     Problem: Respiratory Compromise (Stroke, Hemorrhagic)  Goal: Effective Oxygenation and Ventilation  Outcome: Ongoing, Progressing     Problem: Sensorimotor Impairment (Stroke, Hemorrhagic)  Goal: Improved Sensorimotor Function  Outcome: Ongoing, Progressing     Problem: Swallowing Impairment (Stroke, Hemorrhagic)  Goal:  Optimal Eating and Swallowing Without Aspiration  Outcome: Ongoing, Progressing     Problem: Urinary Elimination Impaired (Stroke, Hemorrhagic)  Goal: Effective Urinary Elimination  Outcome: Ongoing, Progressing     Problem: Pain Acute  Goal: Acceptable Pain Control and Functional Ability  Outcome: Ongoing, Progressing  Intervention: Prevent or Manage Pain  Recent Flowsheet Documentation  Taken 1/22/2024 2000 by Lalito Acosta RN  Medication Review/Management: medications reviewed     Problem: Skin Injury Risk Increased  Goal: Skin Health and Integrity  Outcome: Ongoing, Progressing   Goal Outcome Evaluation:

## 2024-01-24 LAB
ANION GAP SERPL CALCULATED.3IONS-SCNC: 10.2 MMOL/L (ref 5–15)
BUN SERPL-MCNC: 6 MG/DL (ref 6–20)
BUN/CREAT SERPL: 12 (ref 7–25)
CALCIUM SPEC-SCNC: 9 MG/DL (ref 8.6–10.5)
CHLORIDE SERPL-SCNC: 102 MMOL/L (ref 98–107)
CO2 SERPL-SCNC: 22.8 MMOL/L (ref 22–29)
CREAT SERPL-MCNC: 0.5 MG/DL (ref 0.57–1)
DEPRECATED RDW RBC AUTO: 37.6 FL (ref 37–54)
EGFRCR SERPLBLD CKD-EPI 2021: 108.2 ML/MIN/1.73
ERYTHROCYTE [DISTWIDTH] IN BLOOD BY AUTOMATED COUNT: 12.6 % (ref 12.3–15.4)
GLUCOSE SERPL-MCNC: 102 MG/DL (ref 65–99)
HCT VFR BLD AUTO: 31.8 % (ref 34–46.6)
HGB BLD-MCNC: 10.8 G/DL (ref 12–15.9)
MCH RBC QN AUTO: 28.5 PG (ref 26.6–33)
MCHC RBC AUTO-ENTMCNC: 34 G/DL (ref 31.5–35.7)
MCV RBC AUTO: 83.9 FL (ref 79–97)
PLATELET # BLD AUTO: 449 10*3/MM3 (ref 140–450)
PMV BLD AUTO: 9.4 FL (ref 6–12)
POTASSIUM SERPL-SCNC: 3.5 MMOL/L (ref 3.5–5.2)
RBC # BLD AUTO: 3.79 10*6/MM3 (ref 3.77–5.28)
SODIUM SERPL-SCNC: 135 MMOL/L (ref 136–145)
WBC NRBC COR # BLD AUTO: 11.67 10*3/MM3 (ref 3.4–10.8)

## 2024-01-24 PROCEDURE — 25010000002 ENOXAPARIN PER 10 MG: Performed by: NURSE PRACTITIONER

## 2024-01-24 PROCEDURE — 85027 COMPLETE CBC AUTOMATED: CPT | Performed by: INTERNAL MEDICINE

## 2024-01-24 PROCEDURE — 80048 BASIC METABOLIC PNL TOTAL CA: CPT | Performed by: INTERNAL MEDICINE

## 2024-01-24 RX ORDER — POTASSIUM CHLORIDE 750 MG/1
40 TABLET, FILM COATED, EXTENDED RELEASE ORAL EVERY 4 HOURS
Status: COMPLETED | OUTPATIENT
Start: 2024-01-24 | End: 2024-01-24

## 2024-01-24 RX ADMIN — SODIUM CHLORIDE 100 ML/HR: 4.5 INJECTION, SOLUTION INTRAVENOUS at 21:33

## 2024-01-24 RX ADMIN — POTASSIUM CHLORIDE 40 MEQ: 750 TABLET, EXTENDED RELEASE ORAL at 11:50

## 2024-01-24 RX ADMIN — Medication 10 ML: at 08:01

## 2024-01-24 RX ADMIN — NIMODIPINE 30 MG: 30 CAPSULE, LIQUID FILLED ORAL at 11:53

## 2024-01-24 RX ADMIN — NIMODIPINE 30 MG: 30 CAPSULE, LIQUID FILLED ORAL at 14:20

## 2024-01-24 RX ADMIN — ATORVASTATIN CALCIUM 20 MG: 20 TABLET, FILM COATED ORAL at 20:07

## 2024-01-24 RX ADMIN — ASPIRIN 81 MG: 81 TABLET, COATED ORAL at 08:01

## 2024-01-24 RX ADMIN — NIMODIPINE 30 MG: 30 CAPSULE, LIQUID FILLED ORAL at 08:00

## 2024-01-24 RX ADMIN — NIMODIPINE 30 MG: 30 CAPSULE, LIQUID FILLED ORAL at 18:30

## 2024-01-24 RX ADMIN — POTASSIUM CHLORIDE 40 MEQ: 750 TABLET, EXTENDED RELEASE ORAL at 06:49

## 2024-01-24 RX ADMIN — NIMODIPINE 30 MG: 30 CAPSULE, LIQUID FILLED ORAL at 06:49

## 2024-01-24 RX ADMIN — NIMODIPINE 30 MG: 30 CAPSULE, LIQUID FILLED ORAL at 10:05

## 2024-01-24 RX ADMIN — LEVETIRACETAM 500 MG: 500 TABLET, FILM COATED ORAL at 08:01

## 2024-01-24 RX ADMIN — NIMODIPINE 30 MG: 30 CAPSULE, LIQUID FILLED ORAL at 22:15

## 2024-01-24 RX ADMIN — SODIUM CHLORIDE 100 ML/HR: 4.5 INJECTION, SOLUTION INTRAVENOUS at 04:21

## 2024-01-24 RX ADMIN — NIMODIPINE 30 MG: 30 CAPSULE, LIQUID FILLED ORAL at 16:08

## 2024-01-24 RX ADMIN — Medication 10 ML: at 20:08

## 2024-01-24 RX ADMIN — ENOXAPARIN SODIUM 40 MG: 100 INJECTION SUBCUTANEOUS at 11:51

## 2024-01-24 RX ADMIN — MIDODRINE HYDROCHLORIDE 5 MG: 5 TABLET ORAL at 20:07

## 2024-01-24 RX ADMIN — Medication 10 ML: at 20:07

## 2024-01-24 RX ADMIN — DOCUSATE SODIUM 50MG AND SENNOSIDES 8.6MG 2 TABLET: 8.6; 5 TABLET, FILM COATED ORAL at 20:07

## 2024-01-24 RX ADMIN — LEVETIRACETAM 500 MG: 500 TABLET, FILM COATED ORAL at 20:07

## 2024-01-24 RX ADMIN — NIMODIPINE 30 MG: 30 CAPSULE, LIQUID FILLED ORAL at 00:21

## 2024-01-24 RX ADMIN — MIDODRINE HYDROCHLORIDE 5 MG: 5 TABLET ORAL at 04:21

## 2024-01-24 RX ADMIN — MIDODRINE HYDROCHLORIDE 5 MG: 5 TABLET ORAL at 11:50

## 2024-01-24 RX ADMIN — NIMODIPINE 30 MG: 30 CAPSULE, LIQUID FILLED ORAL at 20:07

## 2024-01-24 RX ADMIN — NIMODIPINE 30 MG: 30 CAPSULE, LIQUID FILLED ORAL at 02:34

## 2024-01-24 RX ADMIN — NIMODIPINE 30 MG: 30 CAPSULE, LIQUID FILLED ORAL at 04:21

## 2024-01-24 NOTE — PROGRESS NOTES
Name: Binta Morales ADMIT: 2024   : 1965  PCP: Alma Delia Rudolph PA-C    MRN: [id number] LOS: 8 days   AGE/SEX: 58 y.o. female  ROOM: [facility]     Subjective   Subjective   Patient resting comfortably in bed.  No headache, nausea, vomiting.  States she feels better than she has for some time.    Review of Systems   Constitutional:  Negative for chills and fever.   Respiratory:  Negative for cough and shortness of breath.    Cardiovascular:  Negative for chest pain and leg swelling.   Gastrointestinal:  Negative for abdominal pain, diarrhea and nausea.     As above     Objective   Objective   Vital Signs  Temp:  [98.2 °F (36.8 °C)-100.4 °F (38 °C)] 99.3 °F (37.4 °C)  Heart Rate:  [58-91] 75  Resp:  [18-20] 20  BP: ()/(52-82) 123/62  SpO2:  [89 %-100 %] 95 %  on   ;   Device (Oxygen Therapy): room air  Body mass index is 25.28 kg/m².  Physical Exam  Constitutional:       General: She is not in acute distress.     Appearance: She is not ill-appearing.   Cardiovascular:      Rate and Rhythm: Normal rate and regular rhythm.   Pulmonary:      Effort: Pulmonary effort is normal. No respiratory distress.   Abdominal:      General: Abdomen is flat. There is no distension.      Tenderness: There is no abdominal tenderness.   Musculoskeletal:         General: No swelling or deformity. Normal range of motion.   Skin:     General: Skin is warm and dry.   Neurological:      General: No focal deficit present.      Mental Status: She is alert. Mental status is at baseline.         Results Review     I reviewed the patient's new clinical results.  Results from last 7 days   Lab Units 24  0501 24  0347 24  0437 24  0441   WBC 10*3/mm3 11.67* 13.07* 14.46* 13.98*   HEMOGLOBIN g/dL 10.8* 11.1* 11.3* 12.9   PLATELETS 10*3/mm3 449 469* 469* 477*     Results from last 7 days   Lab Units 24  0501 24  0347 24  0437 24  0441   SODIUM mmol/L 135* 137 135* 134*   POTASSIUM  "mmol/L 3.5 3.9 3.8 3.9   CHLORIDE mmol/L 102 102 102 101   CO2 mmol/L 22.8 22.2 21.6* 21.0*   BUN mg/dL 6 6 7 12   CREATININE mg/dL 0.50* 0.54* 0.45* 0.60   GLUCOSE mg/dL 102* 115* 110* 117*   Estimated Creatinine Clearance: 118.2 mL/min (A) (by C-G formula based on SCr of 0.5 mg/dL (L)).    Results from last 7 days   Lab Units 01/24/24  0501 01/23/24  0347 01/22/24  0437 01/21/24  0441   CALCIUM mg/dL 9.0 9.0 8.7 9.3     Results from last 7 days   Lab Units 01/23/24  1038 01/17/24  2124   PROCALCITONIN ng/mL 0.02 0.22   LACTATE mmol/L  --  1.0   No results found for: \"COVID19\"  No results found for: \"HGBA1C\", \"POCGLU\"    CT Head Without Contrast  Narrative: CT OF THE HEAD WITHOUT CONTRAST     HISTORY: Subarachnoid hemorrhage     COMPARISON: January 19, 2024     TECHNIQUE: Axial CT imaging was obtained through the brain. No IV  contrast was administered.     FINDINGS:  The patient is a small amount of bilateral subarachnoid hemorrhage. It  appears slightly less conspicuous on today's exam. Patient has undergone  embolization of an anterior communicating artery aneurysm. Axial images  suggest a tiny area of increased attenuation superior to the  embolization device. Image 16, series 2. It is not confirmed on the  sagittal and coronal images, and may be artifactual. However, consider  CTA for further assessment. Ventricles are normal in size. There is no  midline shift or mass effect. Old left cerebellar infarct is noted.  Additional old infarct is noted within the left basal ganglia. Air-fluid  levels are noted within the paranasal sinuses, concerning for acute  sinusitis.        Impression:    1. Small amount of bilateral subarachnoid hemorrhage, perhaps slightly  less conspicuous.  2. Axial images suggest a tiny hyperdense focus superior to the  embolization coil. This is not confirmed on sagittal or coronal images,  and this may be artifactual, although CT angiography could be considered  for further " assessment.  Radiation dose reduction techniques were utilized, including automated  exposure control and exposure modulation based on body size.        This report was finalized on 1/20/2024 5:02 AM by Dr. Gauri Feldman M.D on Workstation: [id number]       I reviewed the patient's daily medications.  Scheduled Medications  aspirin, 81 mg, Oral, Daily  atorvastatin, 20 mg, Oral, Nightly  enoxaparin, 40 mg, Subcutaneous, Q24H  levETIRAcetam, 500 mg, Oral, Q12H  midodrine, 5 mg, Oral, Q8H  niMODipine, 30 mg, Oral, Q2H  senna-docusate sodium, 2 tablet, Oral, BID  sodium chloride, 10 mL, Intravenous, Q12H  sodium chloride, 10 mL, Intravenous, Q12H  sodium chloride, 10 mL, Intravenous, Q12H    Infusions  hold, 1 each  sodium chloride, 100 mL/hr, Last Rate: 100 mL/hr (01/24/24 0635)    Diet  Diet: Regular/House Diet; Texture: Regular Texture (IDDSI 7); Fluid Consistency: Thin (IDDSI 0)         I have personally reviewed:  [x]  Laboratory   [x]  Microbiology   [x]  Radiology   []  EKG/Telemetry   []  Cardiology/Vascular   []  Pathology   [x]  Records     Assessment/Plan     Active Hospital Problems    Diagnosis  POA    **SAH (subarachnoid hemorrhage) [I60.9]  Yes    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       58 y.o. female admitted with SAH (subarachnoid hemorrhage).    Subarachnoid hemorrhage   ACOM aneurysm status post blood placement and embolization on 1/16  -Keppra for seizure prophylaxis  -Nimodipine every 2 hours x 21 days,  midodrine  -SBP goal of 100-180  -Aspirin, statin    E. coli UTI  -Completed 3 days of ceftriaxone    Hypertension-holding home medications    SCDs for DVT prophylaxis.  Full code.  Discussed with patient and nursing staff.  Anticipate discharge home with home health when cleared by consultants.     Expected discharge date/ time has not been  documented.      Timoteo Wetzel MD  [facility]  01/24/24  14:31 EST

## 2024-01-24 NOTE — PROGRESS NOTES
[facility] INPATIENT PROGRESS NOTE         [facility] INTENSIVE CARE    2024      PATIENT IDENTIFICATION:  Name: Binta Morales ADMIT: 2024   : 1965  PCP: Alma Delia Rudolph PA-C    MRN: [id number] LOS: 8 days   AGE/SEX: 58 y.o. female  ROOM: [facility]                     LOS 8    Reason for visit: Subarachnoid hemorrhage due to ACOM aneurysm      SUBJECTIVE:      Resting comfortably.  Off pressors.  No new issues overnight.  Can transfer out of intensive care.  Discussed with family at bedside.      Objective   OBJECTIVE:    Vital Sign Min/Max for last 24 hours  Temp  Min: 98.2 °F (36.8 °C)  Max: 100.4 °F (38 °C)   BP  Min: 96/54  Max: 153/73   Pulse  Min: 42  Max: 91   Resp  Min: 18  Max: 18   SpO2  Min: 89 %  Max: 100 %   No data recorded   Weight  Min: 68.9 kg (151 lb 14.4 oz)  Max: 68.9 kg (151 lb 14.4 oz)    Vitals:    24 0620 24 0700 24 0800 24 0900   BP:  96/54 110/60 105/58   BP Location:  Left arm     Patient Position:  Lying     Pulse:  58 75 62   Resp:  18     Temp:  99.7 °F (37.6 °C)     TempSrc:  Oral     SpO2:  96% 95% 95%   Weight: 68.9 kg (151 lb 14.4 oz)      Height:                24  0545 24  0500 24  0620   Weight: 68.9 kg (151 lb 14.4 oz) 65.7 kg (144 lb 13.5 oz) 68.9 kg (151 lb 14.4 oz)       Body mass index is 25.28 kg/m².                          Body mass index is 25.28 kg/m².    Intake/Output Summary (Last 24 hours) at 2024 0936  Last data filed at 2024 0635  Gross per 24 hour   Intake 4430.83 ml   Output 1100 ml   Net 3330.83 ml         Exam:  GEN:  No distress, appears stated age  EYES:   PERRL, anicteric sclerae  ENT:    External ears/nose normal, OP clear  NECK:  No adenopathy, midline trachea  LUNGS: Normal chest on inspection, palpation and auscultation  CV:  Normal S1S2, without murmur  ABD:  Nontender, nondistended, no hepatosplenomegaly, +BS  EXT:  No edema.  No cyanosis or clubbing.  No mottling and  "normal cap refill.    Assessment     Scheduled meds:  aspirin, 81 mg, Oral, Daily  atorvastatin, 20 mg, Oral, Nightly  enoxaparin, 40 mg, Subcutaneous, Q24H  levETIRAcetam, 500 mg, Oral, Q12H  midodrine, 5 mg, Oral, Q8H  niMODipine, 30 mg, Oral, Q2H  potassium chloride ER, 40 mEq, Oral, Q4H  senna-docusate sodium, 2 tablet, Oral, BID  sodium chloride, 10 mL, Intravenous, Q12H  sodium chloride, 10 mL, Intravenous, Q12H  sodium chloride, 10 mL, Intravenous, Q12H      IV meds:                      hold, 1 each  phenylephrine, 0.5-3 mcg/kg/min, Last Rate: Stopped (01/23/24 1816)  sodium chloride, 100 mL/hr, Last Rate: 100 mL/hr (01/24/24 0635)      Data Review:  Results from last 7 days   Lab Units 01/24/24  0501 01/23/24 0347 01/22/24 0437 01/21/24 0441 01/20/24  0450   SODIUM mmol/L 135* 137 135* 134* 141   POTASSIUM mmol/L 3.5 3.9 3.8 3.9 3.7   CHLORIDE mmol/L 102 102 102 101 104   CO2 mmol/L 22.8 22.2 21.6* 21.0* 22.0   BUN mg/dL 6 6 7 12 13   CREATININE mg/dL 0.50* 0.54* 0.45* 0.60 0.57   GLUCOSE mg/dL 102* 115* 110* 117* 103*   CALCIUM mg/dL 9.0 9.0 8.7 9.3 9.1         Estimated Creatinine Clearance: 118.2 mL/min (A) (by C-G formula based on SCr of 0.5 mg/dL (L)).  Results from last 7 days   Lab Units 01/24/24  0501 01/23/24  0347 01/22/24  0437 01/21/24 0441 01/20/24  0450   WBC 10*3/mm3 11.67* 13.07* 14.46* 13.98* 10.32   HEMOGLOBIN g/dL 10.8* 11.1* 11.3* 12.9 13.2   PLATELETS 10*3/mm3 449 469* 469* 477* 338                     Results from last 7 days   Lab Units 01/23/24  1038 01/17/24  2124   PROCALCITONIN ng/mL 0.02 0.22   LACTATE mmol/L  --  1.0         No results found for: \"HGBA1C\", \"POCGLU\"        Imaging reviewed  Repeat CT head 1/20 reviewed: Slightly less conspicuous small amount of bilateral subarachnoid hemorrhage.    Microbiology reviewed            Active Hospital Problems    Diagnosis  POA    **SAH (subarachnoid hemorrhage) [I60.9]  Yes    Ruptured aneurysm of anterior communicating artery " [I60.2]  Yes    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 coli UTI      PLAN:  Neurochecks per protocol.  Dopplers to evaluate vasospasm per neurosurgery recommendations.  On Nimotop.  Goal blood pressure 100-180 per neurosurgery notes.  Completed antibiotic for UTI coverage.  Keppra for seizure prophylaxis.  Control glucose.    Control blood pressure.  Mechanical DVT prophylaxis.  Transfer out of ICU.  Encourage work with therapy.  Home when okay with neurosurgery.    Discussed with multidisciplinary ICU team on rounds this morning.          Joshua Wilson MD  Pulmonary and Critical Care Medicine  [facility], [facility]  1/24/2024    09:36 EST

## 2024-01-24 NOTE — SIGNIFICANT NOTE
01/24/24 1529   OTHER   Discipline physical therapist   Rehab Time/Intention   Session Not Performed patient/family declined treatment  (pt fatigued after shower with nsg, PT will f/u tomorrow)   Therapy Assessment/Plan (PT)   Criteria for Skilled Interventions Met (PT) yes;meets criteria   Recommendation   PT - Next Appointment 01/25/24

## 2024-01-24 NOTE — PLAN OF CARE
Goal Outcome Evaluation:      VSS. No acute issues this shift.  Patient took a shower and slept between care.

## 2024-01-24 NOTE — PLAN OF CARE
Problem: Adult Inpatient Plan of Care  Goal: Plan of Care Review  Outcome: Ongoing, Progressing  Goal: Patient-Specific Goal (Individualized)  Outcome: Ongoing, Progressing  Goal: Absence of Hospital-Acquired Illness or Injury  Outcome: Ongoing, Progressing  Intervention: Identify and Manage Fall Risk  Recent Flowsheet Documentation  Taken 1/23/2024 2000 by Lalito Acosta RN  Safety Promotion/Fall Prevention:   activity supervised   safety round/check completed  Intervention: Prevent Infection  Recent Flowsheet Documentation  Taken 1/23/2024 2000 by Lalito Acosta RN  Infection Prevention: single patient room provided  Goal: Optimal Comfort and Wellbeing  Outcome: Ongoing, Progressing  Goal: Readiness for Transition of Care  Outcome: Ongoing, Progressing     Problem: Adjustment to Illness (Stroke, Hemorrhagic)  Goal: Optimal Coping  Outcome: Ongoing, Progressing     Problem: Bowel Elimination Impaired (Stroke, Hemorrhagic)  Goal: Effective Bowel Elimination  Outcome: Ongoing, Progressing     Problem: Cerebral Tissue Perfusion (Stroke, Hemorrhagic)  Goal: Optimal Cerebral Tissue Perfusion  Outcome: Ongoing, Progressing     Problem: Cognitive Impairment (Stroke, Hemorrhagic)  Goal: Optimal Cognitive Function  Outcome: Ongoing, Progressing     Problem: Communication Impairment (Stroke, Hemorrhagic)  Goal: Effective Communication Skills  Outcome: Ongoing, Progressing     Problem: Functional Ability Impaired (Stroke, Hemorrhagic)  Goal: Optimal Functional Ability  Outcome: Ongoing, Progressing     Problem: Pain (Stroke, Hemorrhagic)  Goal: Acceptable Pain Control  Outcome: Ongoing, Progressing     Problem: Respiratory Compromise (Stroke, Hemorrhagic)  Goal: Effective Oxygenation and Ventilation  Outcome: Ongoing, Progressing     Problem: Sensorimotor Impairment (Stroke, Hemorrhagic)  Goal: Improved Sensorimotor Function  Outcome: Ongoing, Progressing     Problem: Swallowing Impairment (Stroke, Hemorrhagic)  Goal:  Optimal Eating and Swallowing Without Aspiration  Outcome: Ongoing, Progressing     Problem: Urinary Elimination Impaired (Stroke, Hemorrhagic)  Goal: Effective Urinary Elimination  Outcome: Ongoing, Progressing     Problem: Pain Acute  Goal: Acceptable Pain Control and Functional Ability  Outcome: Ongoing, Progressing  Intervention: Prevent or Manage Pain  Recent Flowsheet Documentation  Taken 1/23/2024 2000 by Lalito Acosta RN  Medication Review/Management: medications reviewed     Problem: Skin Injury Risk Increased  Goal: Skin Health and Integrity  Outcome: Ongoing, Progressing   Goal Outcome Evaluation:

## 2024-01-24 NOTE — PROGRESS NOTES
[facility] NEUROSURGERY INTRACRANIAL PROGRESS NOTE    PATIENT IDENTIFICATION:   Name:  Binta Morales      MRN:  [id number]     58 y.o.  female               CC: SAH secondary to ruptured ACOM aneurysm postprocedure day 8 for embolization and successful web placement. spouse present at bedside.  HH 2 Black 3.      Subjective     Interval History: No events overnight.  Spouse present at bedside.    ROS:  Constitutional: No fever, chills  HEENT: no headache, no vision changes, no tinnitus, no hearing difficulties  Neck: no stiffness  GI: No nausea, vomiting, no swallow difficulties  Neuro: No numbness, tingling, or weakness, no speech difficulties, no balance difficulties    Objective     Vital signs in last 24 hours:  Temp:  [98.2 °F (36.8 °C)-100.4 °F (38 °C)] 99.7 °F (37.6 °C)  Heart Rate:  [42-91] 62  Resp:  [18] 18  BP: ()/(52-87) 105/58    Intake/Output this shift:  No intake/output data recorded.    Intake/Output last 3 shifts:  I/O last 3 completed shifts:  In: 9220.1 [I.V.:9220.1]  Out: 1100 [Urine:1100]    LABS:  Results from last 7 days   Lab Units 01/24/24  0501 01/23/24  0347 01/22/24  0437   WBC 10*3/mm3 11.67* 13.07* 14.46*   HEMOGLOBIN g/dL 10.8* 11.1* 11.3*   HEMATOCRIT % 31.8* 33.1* 34.1   PLATELETS 10*3/mm3 449 469* 469*       Results from last 7 days   Lab Units 01/24/24  0501 01/23/24  0347 01/22/24  0437   SODIUM mmol/L 135* 137 135*   POTASSIUM mmol/L 3.5 3.9 3.8   CHLORIDE mmol/L 102 102 102   CO2 mmol/L 22.8 22.2 21.6*   BUN mg/dL 6 6 7   CREATININE mg/dL 0.50* 0.54* 0.45*   GLUCOSE mg/dL 102* 115* 110*   CALCIUM mg/dL 9.0 9.0 8.7          IMAGING STUDIES:  No radiology results for the last day      I personally viewed and interpreted the patient's chart.    Meds reviewed    Physical exam  Alert, awake, oriented x3  Pupils equal round reactive to light  Extraocular muscles intact  Face symmetric  Speech is fluent and clear  No pronator drift  Motor exam  Bilateral deltoids 5/5, bilateral  "biceps 5/5, bilateral triceps 5/5, bilateral wrist extension 5/5 bilateral hand  5/5  Bilateral hip flexion 5/5, bilateral knee extension 5/5, bilateral DF/PF 5/5  gait deferred  Able to detect light touch in all 4 extremities  right orbital ecchymosis     Assessment & Plan     ASSESSMENT:      SAH (subarachnoid hemorrhage)    Ruptured aneurysm of anterior communicating artery    Communicating hydrocephalus    58-year-old female with SAH secondary to ruptured ACOM aneurysm postprocedure day 7 for embolization and successful web placement    PLAN:     Blood pressure and heart rate have remained stable, she is off coni-okay to transfer to the floor  Neurochecks q4h  Nimodipine x 21 days at an attenuated dose  Keppra  SBP goal 100-180 mmHg  Daily TCD's while inpatient  Okay for DVT prophylaxis     I discussed the patient's findings and my recommendations with Dr. Duncan, patient, family, nursing staff, and primary care team    I spent 35 minutes caring for Binta Morales on this date of service. This time includes time spent by me in the following activities: preparing for the visit, reviewing tests, obtaining and/or reviewing a separately obtained history, performing a medically appropriate examination and/or evaluation, counseling and educating the patient/family/caregiver, ordering medications, tests, or procedures, referring and communicating with other health care professionals, documenting information in the medical record, independently interpreting results and communicating that information with the patient/family/caregiver, and care coordination          LOS: 8 days       Alma Delia Gutierrez, APRN  1/24/2024  10:32 EST    \"Dictated utilizing Dragon dictation\".      "

## 2024-01-25 LAB
ANION GAP SERPL CALCULATED.3IONS-SCNC: 9.4 MMOL/L (ref 5–15)
BUN SERPL-MCNC: 5 MG/DL (ref 6–20)
BUN/CREAT SERPL: 9.8 (ref 7–25)
CALCIUM SPEC-SCNC: 9 MG/DL (ref 8.6–10.5)
CHLORIDE SERPL-SCNC: 104 MMOL/L (ref 98–107)
CO2 SERPL-SCNC: 21.6 MMOL/L (ref 22–29)
CREAT SERPL-MCNC: 0.51 MG/DL (ref 0.57–1)
DEPRECATED RDW RBC AUTO: 37.1 FL (ref 37–54)
EGFRCR SERPLBLD CKD-EPI 2021: 107.7 ML/MIN/1.73
ERYTHROCYTE [DISTWIDTH] IN BLOOD BY AUTOMATED COUNT: 12.5 % (ref 12.3–15.4)
GLUCOSE SERPL-MCNC: 102 MG/DL (ref 65–99)
HCT VFR BLD AUTO: 30.5 % (ref 34–46.6)
HGB BLD-MCNC: 10.5 G/DL (ref 12–15.9)
MCH RBC QN AUTO: 29.2 PG (ref 26.6–33)
MCHC RBC AUTO-ENTMCNC: 34.4 G/DL (ref 31.5–35.7)
MCV RBC AUTO: 84.7 FL (ref 79–97)
PLATELET # BLD AUTO: 487 10*3/MM3 (ref 140–450)
PMV BLD AUTO: 9.1 FL (ref 6–12)
POTASSIUM SERPL-SCNC: 4 MMOL/L (ref 3.5–5.2)
RBC # BLD AUTO: 3.6 10*6/MM3 (ref 3.77–5.28)
SODIUM SERPL-SCNC: 135 MMOL/L (ref 136–145)
WBC NRBC COR # BLD AUTO: 8.49 10*3/MM3 (ref 3.4–10.8)

## 2024-01-25 PROCEDURE — 25010000002 ENOXAPARIN PER 10 MG: Performed by: NURSE PRACTITIONER

## 2024-01-25 PROCEDURE — 80048 BASIC METABOLIC PNL TOTAL CA: CPT | Performed by: INTERNAL MEDICINE

## 2024-01-25 PROCEDURE — 85027 COMPLETE CBC AUTOMATED: CPT | Performed by: INTERNAL MEDICINE

## 2024-01-25 PROCEDURE — 97530 THERAPEUTIC ACTIVITIES: CPT

## 2024-01-25 RX ADMIN — SODIUM CHLORIDE 100 ML/HR: 4.5 INJECTION, SOLUTION INTRAVENOUS at 06:35

## 2024-01-25 RX ADMIN — NIMODIPINE 30 MG: 30 CAPSULE, LIQUID FILLED ORAL at 12:12

## 2024-01-25 RX ADMIN — NIMODIPINE 30 MG: 30 CAPSULE, LIQUID FILLED ORAL at 04:27

## 2024-01-25 RX ADMIN — NIMODIPINE 30 MG: 30 CAPSULE, LIQUID FILLED ORAL at 18:04

## 2024-01-25 RX ADMIN — NIMODIPINE 30 MG: 30 CAPSULE, LIQUID FILLED ORAL at 00:12

## 2024-01-25 RX ADMIN — NIMODIPINE 30 MG: 30 CAPSULE, LIQUID FILLED ORAL at 22:07

## 2024-01-25 RX ADMIN — LEVETIRACETAM 500 MG: 500 TABLET, FILM COATED ORAL at 20:21

## 2024-01-25 RX ADMIN — NIMODIPINE 30 MG: 30 CAPSULE, LIQUID FILLED ORAL at 16:05

## 2024-01-25 RX ADMIN — NIMODIPINE 30 MG: 30 CAPSULE, LIQUID FILLED ORAL at 06:13

## 2024-01-25 RX ADMIN — NIMODIPINE 30 MG: 30 CAPSULE, LIQUID FILLED ORAL at 08:07

## 2024-01-25 RX ADMIN — MIDODRINE HYDROCHLORIDE 5 MG: 5 TABLET ORAL at 12:12

## 2024-01-25 RX ADMIN — Medication 10 ML: at 20:21

## 2024-01-25 RX ADMIN — Medication 10 ML: at 08:10

## 2024-01-25 RX ADMIN — Medication 10 ML: at 20:22

## 2024-01-25 RX ADMIN — NIMODIPINE 30 MG: 30 CAPSULE, LIQUID FILLED ORAL at 20:02

## 2024-01-25 RX ADMIN — DOCUSATE SODIUM 50MG AND SENNOSIDES 8.6MG 2 TABLET: 8.6; 5 TABLET, FILM COATED ORAL at 20:21

## 2024-01-25 RX ADMIN — ASPIRIN 81 MG: 81 TABLET, COATED ORAL at 08:07

## 2024-01-25 RX ADMIN — MIDODRINE HYDROCHLORIDE 5 MG: 5 TABLET ORAL at 04:27

## 2024-01-25 RX ADMIN — Medication 10 ML: at 08:00

## 2024-01-25 RX ADMIN — ENOXAPARIN SODIUM 40 MG: 100 INJECTION SUBCUTANEOUS at 12:12

## 2024-01-25 RX ADMIN — ATORVASTATIN CALCIUM 20 MG: 20 TABLET, FILM COATED ORAL at 20:21

## 2024-01-25 RX ADMIN — NIMODIPINE 30 MG: 30 CAPSULE, LIQUID FILLED ORAL at 10:00

## 2024-01-25 RX ADMIN — MIDODRINE HYDROCHLORIDE 5 MG: 5 TABLET ORAL at 20:02

## 2024-01-25 RX ADMIN — NIMODIPINE 30 MG: 30 CAPSULE, LIQUID FILLED ORAL at 02:12

## 2024-01-25 RX ADMIN — NIMODIPINE 30 MG: 30 CAPSULE, LIQUID FILLED ORAL at 14:06

## 2024-01-25 RX ADMIN — LEVETIRACETAM 500 MG: 500 TABLET, FILM COATED ORAL at 08:07

## 2024-01-25 NOTE — PROGRESS NOTES
Name: Binta Morales ADMIT: 2024   : 1965  PCP: Alma Delia Rudolph PA-C    MRN: [id number] LOS: 9 days   AGE/SEX: 58 y.o. female  ROOM: [facility]     Subjective   Subjective   Having some headache this morning that not as bad as her previous headaches.  Otherwise no complaints this morning.  Family at bedside.  Denied breakfast, does not usually eat breakfast.    Review of Systems   Constitutional:  Negative for chills and fever.   Respiratory:  Negative for cough and shortness of breath.    Cardiovascular:  Negative for chest pain and leg swelling.   Gastrointestinal:  Negative for abdominal pain, diarrhea and nausea.     As above     Objective   Objective   Vital Signs  Temp:  [98.6 °F (37 °C)-99.7 °F (37.6 °C)] 98.6 °F (37 °C)  Heart Rate:  [66-87] 87  Resp:  [16-20] 16  BP: (109-147)/(52-72) 109/60  SpO2:  [95 %-100 %] 97 %  on   ;   Device (Oxygen Therapy): room air  Body mass index is 25.31 kg/m².  Physical Exam  Constitutional:       General: She is not in acute distress.     Appearance: She is not ill-appearing.   Cardiovascular:      Rate and Rhythm: Normal rate and regular rhythm.   Pulmonary:      Effort: Pulmonary effort is normal. No respiratory distress.   Abdominal:      General: Abdomen is flat. There is no distension.      Tenderness: There is no abdominal tenderness.   Musculoskeletal:         General: No swelling or deformity. Normal range of motion.   Skin:     General: Skin is warm and dry.   Neurological:      General: No focal deficit present.      Mental Status: She is alert. Mental status is at baseline.         Results Review     I reviewed the patient's new clinical results.  Results from last 7 days   Lab Units 24  0434 24  0501 24  043   WBC 10*3/mm3 8.49 11.67* 13.07* 14.46*   HEMOGLOBIN g/dL 10.5* 10.8* 11.1* 11.3*   PLATELETS 10*3/mm3 487* 449 469* 469*     Results from last 7 days   Lab Units 24  0434 24  0501 24  "01/22/24  0437   SODIUM mmol/L 135* 135* 137 135*   POTASSIUM mmol/L 4.0 3.5 3.9 3.8   CHLORIDE mmol/L 104 102 102 102   CO2 mmol/L 21.6* 22.8 22.2 21.6*   BUN mg/dL 5* 6 6 7   CREATININE mg/dL 0.51* 0.50* 0.54* 0.45*   GLUCOSE mg/dL 102* 102* 115* 110*   Estimated Creatinine Clearance: 115.9 mL/min (A) (by C-G formula based on SCr of 0.51 mg/dL (L)).    Results from last 7 days   Lab Units 01/25/24  0434 01/24/24  0501 01/23/24  0347 01/22/24  0437   CALCIUM mg/dL 9.0 9.0 9.0 8.7     Results from last 7 days   Lab Units 01/23/24  1038   PROCALCITONIN ng/mL 0.02   No results found for: \"COVID19\"  No results found for: \"HGBA1C\", \"POCGLU\"    CT Head Without Contrast  Narrative: CT OF THE HEAD WITHOUT CONTRAST     HISTORY: Subarachnoid hemorrhage     COMPARISON: January 19, 2024     TECHNIQUE: Axial CT imaging was obtained through the brain. No IV  contrast was administered.     FINDINGS:  The patient is a small amount of bilateral subarachnoid hemorrhage. It  appears slightly less conspicuous on today's exam. Patient has undergone  embolization of an anterior communicating artery aneurysm. Axial images  suggest a tiny area of increased attenuation superior to the  embolization device. Image 16, series 2. It is not confirmed on the  sagittal and coronal images, and may be artifactual. However, consider  CTA for further assessment. Ventricles are normal in size. There is no  midline shift or mass effect. Old left cerebellar infarct is noted.  Additional old infarct is noted within the left basal ganglia. Air-fluid  levels are noted within the paranasal sinuses, concerning for acute  sinusitis.        Impression:    1. Small amount of bilateral subarachnoid hemorrhage, perhaps slightly  less conspicuous.  2. Axial images suggest a tiny hyperdense focus superior to the  embolization coil. This is not confirmed on sagittal or coronal images,  and this may be artifactual, although CT angiography could be considered  for " further assessment.  Radiation dose reduction techniques were utilized, including automated  exposure control and exposure modulation based on body size.        This report was finalized on 1/20/2024 5:02 AM by Dr. Gauri Feldman M.D on Workstation: [id number]       I reviewed the patient's daily medications.  Scheduled Medications  aspirin, 81 mg, Oral, Daily  atorvastatin, 20 mg, Oral, Nightly  enoxaparin, 40 mg, Subcutaneous, Q24H  levETIRAcetam, 500 mg, Oral, Q12H  midodrine, 5 mg, Oral, Q8H  niMODipine, 30 mg, Oral, Q2H  senna-docusate sodium, 2 tablet, Oral, BID  sodium chloride, 10 mL, Intravenous, Q12H  sodium chloride, 10 mL, Intravenous, Q12H  sodium chloride, 10 mL, Intravenous, Q12H    Infusions  hold, 1 each  sodium chloride, 100 mL/hr, Last Rate: 100 mL/hr (01/25/24 0635)    Diet  Diet: Regular/House Diet; Texture: Regular Texture (IDDSI 7); Fluid Consistency: Thin (IDDSI 0)         I have personally reviewed:  [x]  Laboratory   [x]  Microbiology   [x]  Radiology   []  EKG/Telemetry   []  Cardiology/Vascular   []  Pathology   []  Records     Assessment/Plan     Active Hospital Problems    Diagnosis  POA    **SAH (subarachnoid hemorrhage) [I60.9]  Yes    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       58 y.o. female admitted with SAH (subarachnoid hemorrhage).    Subarachnoid hemorrhage   ACOM aneurysm status post blood placement and embolization on 1/16  -Keppra for seizure prophylaxis  -Nimodipine every 2 hours x 21 days,  midodrine  -SBP goal of 100-180, SBP less than 180, MAP greater than 65  -Aspirin, statin  -Discussed with neurosurgery team    E. coli UTI  -Completed 3 days of ceftriaxone    Hypertension-holding home medications    SCDs for DVT prophylaxis.  Full code.  Discussed with patient and nursing staff.  Anticipate discharge home with home health when  cleared by consultants.     Expected discharge date/ time has not been documented.      Timoteo Wetzel MD  [facility]  01/25/24  12:21 EST

## 2024-01-25 NOTE — PLAN OF CARE
Goal Outcome Evaluation:  Plan of Care Reviewed With: patient met lying in bed, vital signs have been stable throughout the shift. BP has been within acceptable limit. Nil fresh complain from her this morning.        Progress: improving

## 2024-01-25 NOTE — PROGRESS NOTES
[facility] INPATIENT PROGRESS NOTE         [address]    2024      PATIENT IDENTIFICATION:  Name: Binta Morales ADMIT: 2024   : 1965  PCP: Alma Delia Rudolph PA-C    MRN: [id number] LOS: 9 days   AGE/SEX: 58 y.o. female  ROOM: [facility]                     LOS 9    Reason for visit: Subarachnoid hemorrhage due to ACOM aneurysm      SUBJECTIVE:      No new pulmonary issues overnight.        Objective   OBJECTIVE:    Vital Sign Min/Max for last 24 hours  Temp  Min: 98.6 °F (37 °C)  Max: 99.7 °F (37.6 °C)   BP  Min: 105/58  Max: 147/63   Pulse  Min: 62  Max: 82   Resp  Min: 17  Max: 20   SpO2  Min: 95 %  Max: 100 %   No data recorded   Weight  Min: 69 kg (152 lb 1.9 oz)  Max: 69 kg (152 lb 1.9 oz)    Vitals:    24 0425 24 0500 24 0613 24 0759   BP: 147/63  120/68 109/52   BP Location: Left arm   Left arm   Patient Position: Lying   Lying   Pulse: 82  66 72   Resp: 18   17   Temp: 99.7 °F (37.6 °C)   98.8 °F (37.1 °C)   TempSrc: Oral   Oral   SpO2: 96%   97%   Weight:  69 kg (152 lb 1.9 oz)     Height:                24  0500 24  0620 24  0500   Weight: 65.7 kg (144 lb 13.5 oz) 68.9 kg (151 lb 14.4 oz) 69 kg (152 lb 1.9 oz)       Body mass index is 25.31 kg/m².                          Body mass index is 25.31 kg/m².  No intake or output data in the 24 hours ending 24 0848        Exam:  GEN:  No distress, appears stated age  NECK:  No adenopathy, midline trachea  LUNGS: Nonlabored     Assessment     Scheduled meds:  aspirin, 81 mg, Oral, Daily  atorvastatin, 20 mg, Oral, Nightly  enoxaparin, 40 mg, Subcutaneous, Q24H  levETIRAcetam, 500 mg, Oral, Q12H  midodrine, 5 mg, Oral, Q8H  niMODipine, 30 mg, Oral, Q2H  senna-docusate sodium, 2 tablet, Oral, BID  sodium chloride, 10 mL, Intravenous, Q12H  sodium chloride, 10 mL, Intravenous, Q12H  sodium chloride, 10 mL, Intravenous, Q12H      IV meds:                      hold, 1 each  sodium  "chloride, 100 mL/hr, Last Rate: 100 mL/hr (01/25/24 0635)      Data Review:  Results from last 7 days   Lab Units 01/25/24  0434 01/24/24  0501 01/23/24  0347 01/22/24  0437 01/21/24  0441   SODIUM mmol/L 135* 135* 137 135* 134*   POTASSIUM mmol/L 4.0 3.5 3.9 3.8 3.9   CHLORIDE mmol/L 104 102 102 102 101   CO2 mmol/L 21.6* 22.8 22.2 21.6* 21.0*   BUN mg/dL 5* 6 6 7 12   CREATININE mg/dL 0.51* 0.50* 0.54* 0.45* 0.60   GLUCOSE mg/dL 102* 102* 115* 110* 117*   CALCIUM mg/dL 9.0 9.0 9.0 8.7 9.3         Estimated Creatinine Clearance: 115.9 mL/min (A) (by C-G formula based on SCr of 0.51 mg/dL (L)).  Results from last 7 days   Lab Units 01/25/24  0434 01/24/24  0501 01/23/24 0347 01/22/24  0437 01/21/24  0441   WBC 10*3/mm3 8.49 11.67* 13.07* 14.46* 13.98*   HEMOGLOBIN g/dL 10.5* 10.8* 11.1* 11.3* 12.9   PLATELETS 10*3/mm3 487* 449 469* 469* 477*                     Results from last 7 days   Lab Units 01/23/24  1038   PROCALCITONIN ng/mL 0.02         No results found for: \"HGBA1C\", \"POCGLU\"        Imaging reviewed  Repeat CT head 1/20 reviewed: Slightly less conspicuous small amount of bilateral subarachnoid hemorrhage.    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 coli UTI      PLAN:  Neurochecks per protocol.  Completed antibiotic for UTI coverage.  Keppra for seizure prophylaxis.  Control glucose.    Control blood pressure.  Mechanical DVT prophylaxis.  Encourage work with therapy.  Doing well out of ICU.  Respiratory status " stable.  We will sign off.            Joshua Wilson MD  Pulmonary and Critical Care Medicine  [facility], [facility]  1/25/2024    08:48 EST

## 2024-01-25 NOTE — PROGRESS NOTES
"Nutrition Services    Patient Name:  Binta Morales  YOB: 1965  MRN: [id number]  Admit Date:  1/16/2024    Assessment Date:  01/25/24    NUTRITION SCREENING      Reason for Encounter LOS   Diagnosis/Problem SAH        PO Diet Diet: Regular/House Diet; Texture: Regular Texture (IDDSI 7); Fluid Consistency: Thin (IDDSI 0)   Supplements    PO Intake % 75% breakfast        Medications MAR reviewed by RD   Labs  Listed below, reviewed   Physical Findings Alert, oriented, room air, overweight    GI Function Nondistended, hyperactive, last BM 1/24   Skin Status Bruising        Height  Weight  BMI  Weight Trend     Height: 165.1 cm (65\")  Weight: 69 kg (152 lb 1.9 oz) (01/25/24 0500)  Body mass index is 25.31 kg/m².  Unknown       Nutrition Problem (PES) No nutrition diagnosis at this time.       Intervention/Plan Ensure Plus once daily with breakfast   Will CTM PO/ONS intake     RD to follow up per protocol.     Results from last 7 days   Lab Units 01/25/24  0434 01/24/24  0501 01/23/24  0347   SODIUM mmol/L 135* 135* 137   POTASSIUM mmol/L 4.0 3.5 3.9   CHLORIDE mmol/L 104 102 102   CO2 mmol/L 21.6* 22.8 22.2   BUN mg/dL 5* 6 6   CREATININE mg/dL 0.51* 0.50* 0.54*   CALCIUM mg/dL 9.0 9.0 9.0   GLUCOSE mg/dL 102* 102* 115*     Results from last 7 days   Lab Units 01/25/24  0434   HEMOGLOBIN g/dL 10.5*   HEMATOCRIT % 30.5*     Lab Results   Component Value Date    HGBA1C 5.80 (H) 01/18/2024         Electronically signed by:  Jory King RDN, DHIRAJ  01/25/24 09:39 EST  "

## 2024-01-25 NOTE — PLAN OF CARE
Goal Outcome Evaluation:  Plan of Care Reviewed With: patient, spouse        Progress: improving  Outcome Evaluation: Pt A&Ox4, up adlib family at bedside this shift, room air, reg diet w/ boost, iv fluids, transcranial doppler completed this shift, BP Q2 as required by medication standard, PICC Rt upper arm, communication with Clinical outcomes RN: necessity of PICC? Pt and spouse educated for increased risk of infection with PICC, educat for need, pt states she has had several iv sites this admission and thus the reason for having it, chat to MD Wetzel for possiblly obtain peripheral and remove PICC, MD reviewed pt situation and will leave PICC inplace until discharge, SR on monitor w/BBB, rash to lower half back is improving- no complaint from pt, NIH performed by floor RN staff with certification,

## 2024-01-25 NOTE — CASE MANAGEMENT/SOCIAL WORK
Continued Stay Note  [facility]     Patient Name: Binta Morales  MRN: [id number]  Today's Date: 1/25/2024    Admit Date: 1/16/2024    Plan: Home with spouse and possible home health.   Discharge Plan       Row Name 01/25/24 1346       Plan    Plan Home with spouse and possible home health.    Plan Comments MD note states home with home health upon DC.  CCP spoke w/ pt's spouse Morgan by phone, patient was sleeping.  He is in agreement with home health and gave CCP permission to look for an accepting HH agency.  Referrals sent, evals pending.  S/W Juan F/ [facility] - they cannot accept [employer]. ..............Ericka FAN/ JAIRON                   Discharge Codes    No documentation.                       Ericka Huggins RN

## 2024-01-25 NOTE — DISCHARGE PLACEMENT REQUEST
"Trav Morales (58 y.o. Female)       Date of Birth   1965    Social Security Number       Address   [address] [address]    Home Phone   [phone]    MRN   [id number]       Adventism   Synagogue    Marital Status                               Admission Date   1/16/24    Admission Type   Emergency    Admitting Provider   Chris Levine MD    Attending Provider   Timoteo Wetzel MD    Department, Room/Bed   [address], [address]       Discharge Date       Discharge Disposition       Discharge Destination                                 Attending Provider: Timoteo Wetzel MD    Allergies: Augmentin [Amoxicillin-pot Clavulanate], Latex, Sulfa Antibiotics    Isolation: None   Infection: None   Code Status: CPR    Ht: 165.1 cm (65\")   Wt: 69 kg (152 lb 1.9 oz)    Admission Cmt: None   Principal Problem: SAH (subarachnoid hemorrhage) [I60.9]                   Active Insurance as of 1/16/2024       Primary Coverage       Payor Plan Insurance Group Employer/Plan Group    LEXIE OWEN [id number]       Payor Plan Address Payor Plan Phone Number Payor Plan Fax Number Effective Dates    [address] [phone]  1/1/2020 - None Entered    [facility] [id number]         Subscriber Name Subscriber Birth Date Member ID       TRAV MORALES 1965 [id number]                     Emergency Contacts        (Rel.) Home Phone Work Phone Mobile Phone    REJI Morales (Spouse) [phone] -- --                "

## 2024-01-25 NOTE — PLAN OF CARE
Goal Outcome Evaluation:  Plan of Care Reviewed With: patient        Progress: improving  Outcome Evaluation: Pt supine in bed at start of session and agreeable to work with PT. Pt was able to ambulate 300' with HHA and CGA. Discussed ambulating with family and nsg staff. Pt's spouse asking about HHPT vs OPPT, questions were answered. PT to sign off at this time, pt likely to D/C home soon and acute PT no longer needed.      Anticipated Discharge Disposition (PT): home with assist, home with home health

## 2024-01-25 NOTE — PROGRESS NOTES
[facility] NEUROSURGERY INTRACRANIAL PROGRESS NOTE    PATIENT IDENTIFICATION:   Name:  Binta Morales      MRN:  [id number]     58 y.o.  female               CC: SAH secondary to ruptured ACOM aneurysm postprocedure day 9 for embolization and successful web placement. spouse present at bedside.  HH 2 Black 3.      Subjective     Interval History: No events overnight.  Spouse present at bedside.    ROS:  Constitutional: No fever, chills  HEENT: no headache, no vision changes, no tinnitus, no hearing difficulties  Neck: no stiffness  GI: No nausea, vomiting, no swallow difficulties  Neuro: No numbness, tingling, or weakness, no speech difficulties, no balance difficulties    Objective     Vital signs in last 24 hours:  Temp:  [98.6 °F (37 °C)-99.7 °F (37.6 °C)] 98.6 °F (37 °C)  Heart Rate:  [66-87] 87  Resp:  [16-20] 16  BP: (109-147)/(52-72) 124/64    Intake/Output this shift:  No intake/output data recorded.    Intake/Output last 3 shifts:  I/O last 3 completed shifts:  In: 1858.4 [P.O.:240; I.V.:1618.4]  Out: 1100 [Urine:1100]    LABS:  Results from last 7 days   Lab Units 01/25/24  0434 01/24/24  0501 01/23/24  0347   WBC 10*3/mm3 8.49 11.67* 13.07*   HEMOGLOBIN g/dL 10.5* 10.8* 11.1*   HEMATOCRIT % 30.5* 31.8* 33.1*   PLATELETS 10*3/mm3 487* 449 469*       Results from last 7 days   Lab Units 01/25/24  0434 01/24/24  0501 01/23/24  0347   SODIUM mmol/L 135* 135* 137   POTASSIUM mmol/L 4.0 3.5 3.9   CHLORIDE mmol/L 104 102 102   CO2 mmol/L 21.6* 22.8 22.2   BUN mg/dL 5* 6 6   CREATININE mg/dL 0.51* 0.50* 0.54*   GLUCOSE mg/dL 102* 102* 115*   CALCIUM mg/dL 9.0 9.0 9.0          IMAGING STUDIES:  No radiology results for the last day      I personally viewed and interpreted the patient's chart.    Meds reviewed    Physical exam  Alert, awake, oriented x3  Pupils equal round reactive to light  Extraocular muscles intact  Face symmetric  Speech is fluent and clear  No pronator drift  Motor exam  Bilateral deltoids 5/5,  "bilateral biceps 5/5, bilateral triceps 5/5, bilateral wrist extension 5/5 bilateral hand  5/5  Bilateral hip flexion 5/5, bilateral knee extension 5/5, bilateral DF/PF 5/5  gait deferred  Able to detect light touch in all 4 extremities  right orbital ecchymosis     Assessment & Plan     ASSESSMENT:      SAH (subarachnoid hemorrhage)    Ruptured aneurysm of anterior communicating artery    Communicating hydrocephalus    58-year-old female with SAH secondary to ruptured ACOM aneurysm postprocedure day 8 for embolization and successful web placement    PLAN:       Neurochecks q4h  Nimodipine x 21 days at an attenuated dose  Keppra  SBP goal 100-180 mmHg  Daily TCD's while inpatient  Okay for DVT prophylaxis   Hopefully home in the next few days    I discussed the patient's findings and my recommendations with Dr. Duncan, patient, family, nursing staff, and primary care team    I spent 35 minutes caring for Binta Morales on this date of service. This time includes time spent by me in the following activities: preparing for the visit, reviewing tests, obtaining and/or reviewing a separately obtained history, performing a medically appropriate examination and/or evaluation, counseling and educating the patient/family/caregiver, ordering medications, tests, or procedures, referring and communicating with other health care professionals, documenting information in the medical record, independently interpreting results and communicating that information with the patient/family/caregiver, and care coordination          LOS: 9 days       Alma Delia Gutierrez, APRN  1/25/2024  14:55 EST    \"Dictated utilizing Dragon dictation\".      "

## 2024-01-25 NOTE — THERAPY TREATMENT NOTE
Patient Name: Binta Morales  : 1965    MRN: [id number]                              Today's Date: 2024       Admit Date: 2024    Visit Dx:     ICD-10-CM ICD-9-CM   1. SAH (subarachnoid hemorrhage)  I60.9 430     Patient Active Problem List   Diagnosis    SAH (subarachnoid hemorrhage)    Ruptured aneurysm of anterior communicating artery    Communicating hydrocephalus     Past Medical History:   Diagnosis Date    Hypertension      Past Surgical History:   Procedure Laterality Date    EMBOLIZATION CEREBRAL N/A 2024    Procedure: CEREBRAL ANGIOGRAM WITH EMBOLIZATION;  Surgeon: Angelito Gamble MD;  Location: [facility] ;  Service: Interventional Radiology;  Laterality: N/A;      General Information       Row Name 24 1631          Physical Therapy Time and Intention    Document Type therapy note (daily note)  -DB     Mode of Treatment physical therapy;individual therapy  -DB       Row Name 24 1631          General Information    Patient Profile Reviewed yes  -DB               User Key  (r) = Recorded By, (t) = Taken By, (c) = Cosigned By      Initials Name Provider Type    DB Sierra Thao PT Physical Therapist                   Mobility       Row Name 24 1632          Bed Mobility    Bed Mobility supine-sit;sit-supine  -DB     Supine-Sit [place] (Bed Mobility) modified independence  -DB     Sit-Supine [place] (Bed Mobility) modified independence  -DB     Assistive Device (Bed Mobility) bed rails;head of bed elevated  -DB       Row Name 24 1632          Sit-Stand Transfer    Sit-Stand [place] (Transfers) standby assist  -DB     Assistive Device (Sit-Stand Transfers) other (see comments)  HHA  -DB       Row Name 24 1632          Gait/Stairs (Locomotion)    [place] Level (Gait) contact guard;verbal cues  -DB     Assistive Device (Gait) other (see comments)  HHA  -DB     Distance in Feet (Gait) 300  -DB     Deviations/Abnormal Patterns  (Gait) clarissa decreased;gait speed decreased  -DB     Bilateral Gait Deviations forward flexed posture  -DB               User Key  (r) = Recorded By, (t) = Taken By, (c) = Cosigned By      Initials Name Provider Type    DB Sierra Thao PT Physical Therapist                   Obj/Interventions       Row Name 01/25/24 1639          Balance    Balance Assessment sitting static balance;sitting dynamic balance;standing static balance;standing dynamic balance  -DB     Static Sitting Balance independent  -DB     Dynamic Sitting Balance independent  -DB     Position, Sitting Balance unsupported;sitting edge of bed  -DB     Static Standing Balance contact guard  -DB     Dynamic Standing Balance contact guard  -DB     Position/Device Used, Standing Balance unsupported  -DB     Balance Interventions sitting;standing;supported  -DB               User Key  (r) = Recorded By, (t) = Taken By, (c) = Cosigned By      Initials Name Provider Type    Sierra Guzmán PT Physical Therapist                   Goals/Plan    No documentation.                  Clinical Impression       Row Name 01/25/24 1640          Pain    Pain Intervention(s) Aromatherapy;Repositioned  -DB       Row Name 01/25/24 1640          Plan of Care Review    Plan of Care Reviewed With patient  -DB     Progress improving  -DB     Outcome Evaluation Pt supine in bed at start of session and agreeable to work with PT. Pt was able to ambulate 300' with HHA and CGA. Discussed ambulating with family and nsg staff. Pt's spouse asking about HHPT vs OPPT, questions were answered. PT to sign off at this time, pt likely to D/C home soon and acute PT no longer needed.  -DB       Row Name 01/25/24 1640          Vital Signs    O2 Delivery Pre Treatment room air  -DB     O2 Delivery Intra Treatment room air  -DB     O2 Delivery Post Treatment room air  -DB     Pre Patient Position Supine  -DB     Intra Patient Position Standing  -DB     Post Patient Position Supine   -DB       Row Name 01/25/24 1640          Positioning and Restraints    Pre-Treatment Position in bed  -DB     Post Treatment Position bed  -DB     In Bed supine;call light within reach;encouraged to call for assist;with family/caregiver;notified nsg  -DB               User Key  (r) = Recorded By, (t) = Taken By, (c) = Cosigned By      Initials Name Provider Type    Sierra Guzmán, ANSLEY Physical Therapist                   Outcome Measures       Row Name 01/25/24 1644 01/25/24 1004       How much help from another person do you currently need...    Turning from your back to your side while in flat bed without using bedrails? 4  -DB 4  -TC    Moving from lying on back to sitting on the side of a flat bed without bedrails? 4  -DB 4  -TC    Moving to and from a bed to a chair (including a wheelchair)? 4  -DB 4  -TC    Standing up from a chair using your arms (e.g., wheelchair, bedside chair)? 4  -DB 4  -TC    Climbing 3-5 steps with a railing? 3  -DB 3  -TC    To walk in hospital room? 4  -DB 4  -TC    AM-PAC 6 Clicks Score (PT) 23  -DB 23  -TC    Highest Level of Mobility Goal 7 --> Walk 25 feet or more  -DB 7 --> Walk 25 feet or more  -TC      Row Name 01/25/24 1644          Functional Assessment    Outcome Measure Options AM-PAC 6 Clicks Basic Mobility (PT)  -DB               User Key  (r) = Recorded By, (t) = Taken By, (c) = Cosigned By      Initials Name Provider Type    Robyn Lerma, RN Registered Nurse    Sierra Guzmán, ANSLEY Physical Therapist                                 Physical Therapy Education       Title: PT OT SLP Therapies (Done)       Topic: Physical Therapy (Done)       Point: Mobility training (Done)       Learning Progress Summary             Patient Acceptance, E, VU by DB at 1/25/2024 1644    Acceptance, E, VU by ML at 1/25/2024 0509    Acceptance, E, VU by DB at 1/22/2024 1553    Acceptance, TB,E, NR by ST at 1/20/2024 1513    Acceptance, E, VU by ER at 1/19/2024 1128    Acceptance,  E,TB, VU by MS at 1/18/2024 1150   Family Acceptance, E, VU by DB at 1/25/2024 1644                         Point: Home exercise program (Done)       Learning Progress Summary             Patient Acceptance, E, VU by DB at 1/25/2024 1644    Acceptance, E, VU by ML at 1/25/2024 0509    Acceptance, E, VU by DB at 1/22/2024 1553    Acceptance, E, VU by ER at 1/19/2024 1128    Acceptance, E,TB, VU by MS at 1/18/2024 1150   Family Acceptance, E, VU by DB at 1/25/2024 1644                         Point: Body mechanics (Done)       Learning Progress Summary             Patient Acceptance, E, VU by DB at 1/25/2024 1644    Acceptance, E, VU by ML at 1/25/2024 0509    Acceptance, E, VU by DB at 1/22/2024 1553    Acceptance, E, VU by ER at 1/19/2024 1128    Acceptance, E,TB, VU by MS at 1/18/2024 1150   Family Acceptance, E, VU by DB at 1/25/2024 1644                         Point: Precautions (Done)       Learning Progress Summary             Patient Acceptance, E, VU by DB at 1/25/2024 1644    Acceptance, E, VU by ML at 1/25/2024 0509    Acceptance, E, VU by DB at 1/22/2024 1553    Acceptance, TB,E, NR by ST at 1/20/2024 1513    Acceptance, E, VU by ER at 1/19/2024 1128    Acceptance, E,TB, VU by MS at 1/18/2024 1150   Family Acceptance, E, VU by DB at 1/25/2024 1644                                         User Key       Initials Effective Dates Name Provider Type Discipline    MS 06/16/21 -  Juana Kauffman PT Physical Therapist PT    DB 06/16/21 -  Sierra Thao, PT Physical Therapist PT    ST 09/22/22 -  Shannon Tolliver, PT Physical Therapist PT    ML 06/15/23 -  Odilon Cummins, RN Registered Nurse Nurse    ER 10/15/23 -  Bela Forte, PT Physical Therapist PT                  PT Recommendation and Plan     Plan of Care Reviewed With: patient  Progress: improving  Outcome Evaluation: Pt supine in bed at start of session and agreeable to work with PT. Pt was able to ambulate 300' with HHA and CGA. Discussed  ambulating with family and nsg staff. Pt's spouse asking about HHPT vs OPPT, questions were answered. PT to sign off at this time, pt likely to D/C home soon and acute PT no longer needed.     Time Calculation:         PT Charges       Row Name 01/25/24 1645             Time Calculation    Start Time 1547  -DB      Stop Time 1601  -DB      Time Calculation (min) 14 min  -DB      PT Received On 01/25/24  -DB         Time Calculation- PT    Total Timed Code Minutes- PT 14 minute(s)  -DB                User Key  (r) = Recorded By, (t) = Taken By, (c) = Cosigned By      Initials Name Provider Type    DB Sirera Thao, PT Physical Therapist                  Therapy Charges for Today       Code Description Service Date Service Provider Modifiers Qty    [id number]  PT THERAPEUTIC ACT EA 15 MIN 1/25/2024 Sierra Thao PT GP 1            PT G-Codes  Outcome Measure Options: AM-PAC 6 Clicks Basic Mobility (PT)  AM-PAC 6 Clicks Score (PT): 23  AM-PAC 6 Clicks Score (OT): 21  Modified [place] Scale: 2 - Slight disability.  Unable to carry out all previous activities but able to look after own affairs without assistance.  PT Discharge Summary  Anticipated Discharge Disposition (PT): home with assist, home with home health    Sierra Thao PT  1/25/2024

## 2024-01-26 ENCOUNTER — READMISSION MANAGEMENT (OUTPATIENT)
Dept: CALL CENTER | Facility: HOSPITAL | Age: 59
End: 2024-01-26
Payer: COMMERCIAL

## 2024-01-26 ENCOUNTER — TELEPHONE (OUTPATIENT)
Dept: NEUROSURGERY | Facility: CLINIC | Age: 59
End: 2024-01-26
Payer: COMMERCIAL

## 2024-01-26 VITALS
SYSTOLIC BLOOD PRESSURE: 120 MMHG | BODY MASS INDEX: 25.34 KG/M2 | TEMPERATURE: 99 F | OXYGEN SATURATION: 99 % | RESPIRATION RATE: 18 BRPM | DIASTOLIC BLOOD PRESSURE: 66 MMHG | HEART RATE: 79 BPM | WEIGHT: 152.12 LBS | HEIGHT: 65 IN

## 2024-01-26 LAB
ANION GAP SERPL CALCULATED.3IONS-SCNC: 11.2 MMOL/L (ref 5–15)
BUN SERPL-MCNC: 5 MG/DL (ref 6–20)
BUN/CREAT SERPL: 9.3 (ref 7–25)
CALCIUM SPEC-SCNC: 9.3 MG/DL (ref 8.6–10.5)
CHLORIDE SERPL-SCNC: 104 MMOL/L (ref 98–107)
CO2 SERPL-SCNC: 22.8 MMOL/L (ref 22–29)
CREAT SERPL-MCNC: 0.54 MG/DL (ref 0.57–1)
DEPRECATED RDW RBC AUTO: 41.1 FL (ref 37–54)
EGFRCR SERPLBLD CKD-EPI 2021: 106.2 ML/MIN/1.73
ERYTHROCYTE [DISTWIDTH] IN BLOOD BY AUTOMATED COUNT: 13.2 % (ref 12.3–15.4)
GLUCOSE SERPL-MCNC: 107 MG/DL (ref 65–99)
HCT VFR BLD AUTO: 31.1 % (ref 34–46.6)
HGB BLD-MCNC: 10.5 G/DL (ref 12–15.9)
MCH RBC QN AUTO: 29.3 PG (ref 26.6–33)
MCHC RBC AUTO-ENTMCNC: 33.8 G/DL (ref 31.5–35.7)
MCV RBC AUTO: 86.9 FL (ref 79–97)
PLATELET # BLD AUTO: 566 10*3/MM3 (ref 140–450)
PMV BLD AUTO: 9.4 FL (ref 6–12)
POTASSIUM SERPL-SCNC: 3.6 MMOL/L (ref 3.5–5.2)
RBC # BLD AUTO: 3.58 10*6/MM3 (ref 3.77–5.28)
SODIUM SERPL-SCNC: 138 MMOL/L (ref 136–145)
WBC NRBC COR # BLD AUTO: 7.89 10*3/MM3 (ref 3.4–10.8)

## 2024-01-26 PROCEDURE — 80048 BASIC METABOLIC PNL TOTAL CA: CPT | Performed by: INTERNAL MEDICINE

## 2024-01-26 PROCEDURE — 25010000002 ENOXAPARIN PER 10 MG: Performed by: NURSE PRACTITIONER

## 2024-01-26 PROCEDURE — 85027 COMPLETE CBC AUTOMATED: CPT | Performed by: INTERNAL MEDICINE

## 2024-01-26 RX ORDER — MIDODRINE HYDROCHLORIDE 5 MG/1
5 TABLET ORAL EVERY 8 HOURS
Qty: 90 TABLET | Refills: 0 | Status: SHIPPED | OUTPATIENT
Start: 2024-01-26 | End: 2024-02-25

## 2024-01-26 RX ORDER — ASPIRIN 81 MG/1
81 TABLET ORAL DAILY
Qty: 30 TABLET | Refills: 0 | Status: SHIPPED | OUTPATIENT
Start: 2024-01-27 | End: 2024-02-26

## 2024-01-26 RX ORDER — NIMODIPINE 30 MG/1
60 CAPSULE, LIQUID FILLED ORAL EVERY 4 HOURS
Qty: 144 CAPSULE | Refills: 0 | Status: SHIPPED | OUTPATIENT
Start: 2024-01-26 | End: 2024-02-07

## 2024-01-26 RX ORDER — POTASSIUM CHLORIDE 750 MG/1
40 TABLET, FILM COATED, EXTENDED RELEASE ORAL EVERY 4 HOURS
Status: COMPLETED | OUTPATIENT
Start: 2024-01-26 | End: 2024-01-26

## 2024-01-26 RX ORDER — ATORVASTATIN CALCIUM 20 MG/1
20 TABLET, FILM COATED ORAL NIGHTLY
Qty: 30 TABLET | Refills: 0 | Status: SHIPPED | OUTPATIENT
Start: 2024-01-26 | End: 2024-02-25

## 2024-01-26 RX ADMIN — Medication 10 ML: at 08:38

## 2024-01-26 RX ADMIN — NIMODIPINE 30 MG: 30 CAPSULE, LIQUID FILLED ORAL at 04:17

## 2024-01-26 RX ADMIN — NIMODIPINE 30 MG: 30 CAPSULE, LIQUID FILLED ORAL at 04:19

## 2024-01-26 RX ADMIN — MIDODRINE HYDROCHLORIDE 5 MG: 5 TABLET ORAL at 04:18

## 2024-01-26 RX ADMIN — LEVETIRACETAM 500 MG: 500 TABLET, FILM COATED ORAL at 08:36

## 2024-01-26 RX ADMIN — NIMODIPINE 30 MG: 30 CAPSULE, LIQUID FILLED ORAL at 08:36

## 2024-01-26 RX ADMIN — MIDODRINE HYDROCHLORIDE 5 MG: 5 TABLET ORAL at 12:54

## 2024-01-26 RX ADMIN — NIMODIPINE 30 MG: 30 CAPSULE, LIQUID FILLED ORAL at 12:54

## 2024-01-26 RX ADMIN — ACETAMINOPHEN 650 MG: 325 TABLET, FILM COATED ORAL at 13:23

## 2024-01-26 RX ADMIN — ASPIRIN 81 MG: 81 TABLET, COATED ORAL at 08:36

## 2024-01-26 RX ADMIN — POTASSIUM CHLORIDE 40 MEQ: 750 TABLET, EXTENDED RELEASE ORAL at 08:37

## 2024-01-26 RX ADMIN — NIMODIPINE 30 MG: 30 CAPSULE, LIQUID FILLED ORAL at 06:01

## 2024-01-26 RX ADMIN — NIMODIPINE 30 MG: 30 CAPSULE, LIQUID FILLED ORAL at 14:45

## 2024-01-26 RX ADMIN — NIMODIPINE 30 MG: 30 CAPSULE, LIQUID FILLED ORAL at 00:06

## 2024-01-26 RX ADMIN — NIMODIPINE 30 MG: 30 CAPSULE, LIQUID FILLED ORAL at 10:51

## 2024-01-26 RX ADMIN — POTASSIUM CHLORIDE 40 MEQ: 750 TABLET, EXTENDED RELEASE ORAL at 12:54

## 2024-01-26 RX ADMIN — ENOXAPARIN SODIUM 40 MG: 100 INJECTION SUBCUTANEOUS at 10:51

## 2024-01-26 NOTE — PLAN OF CARE
Goal Outcome Evaluation:              Outcome Evaluation: Attempted to see patient for cognitive treatment, however, family at bedside reporting poor sleep previous night. Patient sleeping upon SLP arrival. Discussed with family at bedside and agreed to hold speech treatment this date.

## 2024-01-26 NOTE — TELEPHONE ENCOUNTER
----- Message from RENNY Maldonado sent at 1/26/2024  3:32 PM EST -----  Regarding: Follow-up  Please have patient follow-up with me or him in 1 month with CT head.  Thank you.

## 2024-01-26 NOTE — PROGRESS NOTES
[facility] Acute Inpt Rehab     Following per stroke order set.     Chart reviewed.  Patient worked with physical therapy 1/25 and able to ambulate 300 feet with contact guard assistance.  Patient was able to stand with stand by assist only.      Patient tolerating a regular diet.    At this time, patient does not appear to need acute inpatient rehab.  Will sign off.    Thanks,   Cristy Shaikh RN  Rehab Admission Nurse

## 2024-01-26 NOTE — PLAN OF CARE
Goal Outcome Evaluation:  Plan of Care Reviewed With: patient met lying in bed, vital signs have been stable. BP still closely monitored. Nil fresh complain from her.        Progress: improving

## 2024-01-26 NOTE — DISCHARGE SUMMARY
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        Hospital Course     Ms. Morales is a 58 y.o. female with a history of hypertension presented after she hit her head on the toilet and lost consciousness.  Did not seek any medical attention at that time but began to have a headache and sharp electrical sensations going down her spine and came to the emergency room for further evaluation.  CT of the head showed extensive subarachnoid hemorrhage.  Neurosurgery was consulted and patient was found to have an ACOM aneurysm status post embolization on .  Stay has been complicated by vasospasms, and subsequent hypotension for treatment of vasospasms.  Have discussed with neurosurgery and plan to discharge patient on aspirin, statin, as well as nimodipine 60 mg every 4 hours for total 21 days and midodrine while she is on the nimodipine.  Patient to hold her home amlodipine/hydrochlorothiazide/lisinopril on discharge.  These can be slowly restarted in the future as needed after the nimodipine/midodrine course has been finished.    Patient had a PICC line that was placed due to difficulty lab draws and getting IV access.  This was removed on day of discharge.    During his admission patient also had a E. coli UTI that was treated with 3 days of ceftriaxone.    At the time of discharge patient was told to take all medications as prescribed, keep all follow-up appointments, and call their doctor or return to the hospital with any worsening or concerning  symptoms.    Day of Discharge     Subjective:  Patient is sleep deprived because she is getting this medication every 2 hours.  Family at bedside.  Eager to go home when they are able to.    Review of Systems   Constitutional:  Negative for chills and fever.   Respiratory:  Negative for cough and shortness of breath.    Cardiovascular:  Negative for chest pain and leg swelling.   Gastrointestinal:  Negative for abdominal pain, diarrhea and nausea.       Physical Exam:  Temp:  [97.7 °F (36.5 °C)-99.8 °F (37.7 °C)] 99 °F (37.2 °C)  Heart Rate:  [67-91] 79  Resp:  [16-18] 18  BP: (113-146)/(55-79) 120/66  Body mass index is 25.31 kg/m².  Physical Exam  Constitutional:       General: She is not in acute distress.     Appearance: She is not ill-appearing.   Cardiovascular:      Rate and Rhythm: Normal rate and regular rhythm.   Pulmonary:      Effort: Pulmonary effort is normal. No respiratory distress.   Abdominal:      General: Abdomen is flat. There is no distension.      Tenderness: There is no abdominal tenderness.   Musculoskeletal:         General: No swelling or deformity. Normal range of motion.   Skin:     General: Skin is warm and dry.   Neurological:      General: No focal deficit present.      Mental Status: She is alert. Mental status is at baseline.   Consultants     Consult Orders (all) (From admission, onward)       Start     Ordered    01/24/24 0959  Inpatient Hospitalist Consult  Once        Specialty:  Hospitalist  Provider:  Cristofer Phillips MD    01/24/24 1000    01/21/24 0906  Inpatient IV Team Consult PICC 2 Lumens  Once        Provider:  (Not yet assigned)    01/21/24 0905    01/20/24 1208  Inpatient Neurology Consult Stroke  Once        Specialty:  Neurology  Provider:  Lux George MD    01/20/24 1211    01/17/24 0823  Notify Stroke Coordinator  Once        Provider:  (Not yet assigned)    01/17/24 0828    01/17/24 0823  Inpatient Rehab Admission Consult  Once        Provider:   (Not yet assigned)    01/17/24 0828    01/17/24 0823  Inpatient Case Management  Consult  Once        Provider:  (Not yet assigned)    01/17/24 0828    01/17/24 0823  Inpatient Diabetes Educator Consult  Once,   Status:  Canceled        Provider:  (Not yet assigned)    01/17/24 0828    01/16/24 1325  Pulmonology (on-call MD unless specified)  Once        Specialty:  Pulmonary Disease  Provider:  Chris Levine MD    01/16/24 1324    01/16/24 1300  Neurosurgery (on-call MD unless specified)  Once        Specialty:  Neurosurgery  Provider:  (Not yet assigned)    01/16/24 1259                  Procedures     Imaging Results (All)       Procedure Component Value Units Date/Time    SCANNED - IMAGING [[id number]] Resulted: 01/16/24     Updated: 01/26/24 1307    SCANNED - IMAGING [[id number]] Resulted: 01/16/24     Updated: 01/25/24 1345    SCANNED - IMAGING [[id number]] Resulted: 01/16/24     Updated: 01/24/24 1459    SCANNED - IMAGING [[id number]] Resulted: 01/16/24     Updated: 01/23/24 1409    SCANNED - IMAGING [[id number]] Resulted: 01/16/24     Updated: 01/22/24 1650    SCANNED - IMAGING [[id number]] Resulted: 01/16/24     Updated: 01/22/24 1650    SCANNED - IMAGING [[id number]] Resulted: 01/16/24     Updated: 01/22/24 1343    SCANNED - IMAGING [[id number]] Resulted: 01/16/24     Updated: 01/22/24 1343    SCANNED - IMAGING [[id number]] Resulted: 01/16/24     Updated: 01/22/24 1343    CT Head Without Contrast [[id number]] Collected: 01/20/24 0453     Updated: 01/20/24 0505    Narrative:      CT OF THE HEAD WITHOUT CONTRAST     HISTORY: Subarachnoid hemorrhage     COMPARISON: January 19, 2024     TECHNIQUE: Axial CT imaging was obtained through the brain. No IV  contrast was administered.     FINDINGS:  The patient is a small amount of bilateral subarachnoid hemorrhage. It  appears slightly less conspicuous on today's exam. Patient has undergone  embolization of an anterior communicating artery aneurysm. Axial  images  suggest a tiny area of increased attenuation superior to the  embolization device. Image 16, series 2. It is not confirmed on the  sagittal and coronal images, and may be artifactual. However, consider  CTA for further assessment. Ventricles are normal in size. There is no  midline shift or mass effect. Old left cerebellar infarct is noted.  Additional old infarct is noted within the left basal ganglia. Air-fluid  levels are noted within the paranasal sinuses, concerning for acute  sinusitis.          Impression:         1. Small amount of bilateral subarachnoid hemorrhage, perhaps slightly  less conspicuous.  2. Axial images suggest a tiny hyperdense focus superior to the  embolization coil. This is not confirmed on sagittal or coronal images,  and this may be artifactual, although CT angiography could be considered  for further assessment.  Radiation dose reduction techniques were utilized, including automated  exposure control and exposure modulation based on body size.        This report was finalized on 1/20/2024 5:02 AM by Dr. Gauri Feldman M.D on Workstation: [id number]       CT Head Without Contrast [[id number]] Collected: 01/19/24 0509     Updated: 01/19/24 0509    Narrative:        Patient: TRAV MOHAMUD  Time Out: 05:08  Exam(s): CT HEAD Without Contrast     EXAM:    CT Head Without Intravenous Contrast    CLINICAL HISTORY:       Evaluate hydrocephalus.    TECHNIQUE:    Axial computed tomography images of the head brain without intravenous   contrast.  CTDI is 55.7 mGy and DLP is 901 mGy-cm.  This CT exam was   performed according to the principle of ALARA (As Low As Reasonably   Achievable) by using one or more of the following dose reduction   techniques: automated exposure control, adjustment of the mA and or kV   according to patient size, and or use of iterative reconstruction   technique.    COMPARISON:    CT head 1 18 2024    FINDINGS:    Brain:  Stable bilateral subarachnoid  hemorrhages.  No significant   white matter disease.  No mass-effect or midline shift.    Ventricles:  Improved minimal dilation of the temporal horns..    Bones joints:  Unremarkable.  No acute fracture.    Soft tissues:  Unremarkable.    Sinuses:  Unremarkable as visualized.  No acute sinusitis.    Mastoid air cells:  Unremarkable as visualized.  No mastoid effusion.    IMPRESSION:       1.  Stable bilateral subarachnoid hemorrhages.  2.  No mass-effect or midline shift.  3.  Improved minimal dilation of the temporal horns.      Impression:          Electronically signed by Rhianna Chi MD on 01-19-24 at 0508    SCANNED - IMAGING [[id number]] Resulted: 01/16/24     Updated: 01/18/24 1352    IR LUMBAR PUNCTURE DIAGNOSTIC [[id number]] Collected: 01/18/24 1241     Updated: 01/18/24 1245    Narrative:      FLUOROSCOPIC GUIDED LUMBAR PUNCTURE     HISTORY: communicating hydrocephalus- high volume LP; I60.9-Nontraumatic  subarachnoid hemorrhage, unspecified       TECHNIQUE:  Informed consent obtained. A pre-procedure timeout was performed  confirming correct patient and procedure.     The patient was placed on the fluoroscopy table, and the skin overlying  the lumbar spine was prepped and draped in the usual sterile fashion  with 2% chlorhexidine. 1% lidocaine was utilized for local anesthesia.     Next, a 20-gauge spinal needle was then advanced into the CSF space  under fluoroscopic guidance at the L3-4 level. Approximately 16 mL of  bloody CSF was then obtained and submitted to the laboratory for  evaluation. Opening pressure was 20 cm of water. Closing pressure was 7  cm of water.     The patient tolerated the procedure well without immediate complication.       Impression:      Technically successful fluoroscopically guided lumbar  puncture.     Reference air kerma: 18.1 mGy     This report was finalized on 1/18/2024 12:42 PM by Dr. Dennis Palumbo M.D on Workstation: [id number]       CT Head Without Contrast  [[id number]] Collected: 01/18/24 0613     Updated: 01/18/24 0613    Narrative:        Patient: TRAV MOHAMUD  Time Out: 06:12  Exam(s): CT HEAD Without Contrast     EXAM:    CT Head Without Intravenous Contrast    CLINICAL HISTORY:     Reason for exam: SAH.    TECHNIQUE:    Axial computed tomography images of the head brain without intravenous   contrast.  CTDI is 55.44 mGy and DLP is 922.9 mGy-cm.  This CT exam was   performed according to the principle of ALARA (As Low As Reasonably   Achievable) by using one or more of the following dose reduction   techniques: automated exposure control, adjustment of the mA and or kV   according to patient size, and or use of iterative reconstruction   technique.    COMPARISON:    1 17 2024    FINDINGS:    Brain: Postsurgical change from endovascular treatment of anterior   communicating artery aneurysm.  Unchanged distribution of subarachnoid   hemorrhage in the basilar cisterns and extending into the bilateral   sylvian fissures and inferior cerebral hemispheric sulci.  No new   hemorrhage.  No significant midline shift.  No edema.    Ventricles:  Unremarkable.  No ventriculomegaly.    Bones joints:  Unremarkable.  No acute fracture.    Soft tissues:  Unremarkable.    Sinuses:  Unremarkable as visualized.  No acute sinusitis.    Mastoid air cells:  Unremarkable as visualized.  No mastoid effusion.    IMPRESSION:       Stable postsurgical changes from anterior communicating artery aneurysm   treatment with subarachnoid hemorrhage centered within the basilar   cisterns.    Impression:          Electronically signed by Lux Monahan M.D. on 01-18-24 at 0612    XR Chest 1 View [[id number]] Collected: 01/17/24 2158     Updated: 01/17/24 2211    Narrative:      STAT PORTABLE VIEW OF THE CHEST ON 01/17/2024     CLINICAL HISTORY: Fever.      COMPARISON: This is correlated to a prior portable chest x-ray earlier  this afternoon on 01/17/2024 at 1:51 p.m.     FINDINGS: The  cardiomediastinal silhouette and pulmonary vasculature are  within normal limits. There is some elevation of the right  hemidiaphragm. There is minimal patchy opacity in the left lung apex I  favor is an area of atelectasis over localized pneumonia. The remainder  of the lungs are clear. The costophrenic angles are sharp. There is an  elevated right hemidiaphragm.       Impression:      1. There is a small patchy airspace opacity at the left apex that is new  when compared to chest x-ray earlier today 01/17/2024. I favor it is a  localized area of atelectasis over pneumonia but correlate clinically  and follow-up chest x-ray could be obtained to reevaluate if clinically  indicated. The remainder of  the chest x-ray is normal.      This report was finalized on 1/17/2024 10:08 PM by Dr. Franklyn Baltazar M.D  on Workstation: [id number]       XR Chest 1 View [[id number]] Collected: 01/17/24 1511     Updated: 01/17/24 1517    Narrative:      XR CHEST 1 VW-1/17/2024     HISTORY: Hypoxemia.     Heart size is within normal limits. Lungs appear free of acute  infiltrates. There is slight elevation of the right hemidiaphragm. Bony  structures appear unremarkable.       Impression:      1. No acute process.        This report was finalized on 1/17/2024 3:14 PM by Dr. Travon Shankar M.D on Workstation: [id number]       Arteriogram (Powerscribe) [[id number]] Collected: 01/17/24 1219     Updated: 01/17/24 1306    Narrative:      CEREBRAL ARTERIOGRAM WITH INTRACRANIAL EMBOLIZATION performed on  1/16/2024.     CLINICAL HISTORY: 58-year-old female with history of a subarachnoid  hemorrhage and ruptured anterior communicating artery aneurysm  approximately 3 days prior to presentation now with a Hunt and Snowden  grade 1 status and Black grade 4 CT scan. The patient is now for  emergent endovascular treatment.      TECHNIQUE: After obtaining informed consent, the patient was placed in a  supine position on the angiographic table and the  right groin site was  then prepped and draped in usual sterile fashion with ChloraPrep soap.  Under ultrasound guidance with permanent images recorded, a 4 [place] Islander  micro-puncture set was used to access the right common femoral artery  through a dermatotomy. Through this access an 8 [place] Islander sheath was  positioned within the right groin. Over an 035 Glidewire, a 5 [place]er  vertebral artery catheter was placed into the circulation through the 8  [place]er sheath and the following vessels were then selectively  catheterized:     1. Right internal carotid artery with digital subtraction imaging of the  intracranial runoff after contrast injection.  2. Right vertebral artery with digital subtraction imaging of the  cervical and intracranial runoff after contrast injection.  3. Left internal carotid artery with digital subtraction imaging of the  intracranial runoff after contrast injection including a rotational  angiogram with 3-D reconstruction at a separate workstation.  4. Left vertebral artery with digital subtraction imaging of the  cervical and intracranial runoff after contrast injection.     Upon completion of the diagnostic portion of the exam, the endovascular  procedure was performed as described below. At the conclusion of the  entire procedure, the catheters and sheath were removed with placement  of an 8 [place] Islander Angio-Seal groin closure device. Once hemostasis was  achieved, the appropriate dressing was applied. No immediate  postprocedural complications were encountered. Approximately 40.7  minutes of fluoroscopy time were required [place] of 48101  uGym2. 230 cc of Visipaque 320 were employed for the procedure. Maximum  sterile barrier technique was performed throughout the entire procedure  according to current guidelines.     Surgeons: Dr. Angelito Gamble M.D.     FINDINGS: The right internal carotid artery injection demonstrates a  normal course and caliber in the cervical segment with the origin  at the  C3 level with no significant stenosis by NASCET criteria. The  intracranial ICA is normal in course and caliber. The M1 segment is  normal in caliber, but the A1 segment is hypoplastic. The A1 segment is  however patent and does supply the A2 runoff as well as opacify the  anterior communicating artery transiently. The posterior cerebral artery  is noted to be fetal in origin with no posterior communicating artery.  The MCA and JUAN runoffs show a normal arterial, capillary and venous  phase. No hypervascular lesions or AV shunting is seen. No cerebral  vasospasm is identified.      The right vertebral artery injection demonstrates a non-dominant  caliber, but a normal course in the cervical and intracranial segments.  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 The basilar artery is normal in caliber. No cervical spinal AV  shunting is seen. A dominant low origin PICA is appreciated. The  anterior inferior cerebellar artery is patent, but nondominant. No  aneurysms, hypervascular lesions or evidence of AV shunting identified  intracranially.     The left internal carotid artery injection demonstrates a normal course  and caliber in the cervical and intracranial segments. The origin occurs  at the C3 level with no atherosclerotic irregularity or stenosis by  NASCET criteria. The A1 and M1 segments are normal in caliber and the  MCA and JUAN runoffs show a normal arterial, capillary and venous phase  with no evidence of cerebral vasospasm. There is an anterior  communicating artery aneurysm present arising from the anterior  communicating artery itself at its junction with the A1 and A2 segments  on the left. No additional aneurysms are seen. No posterior  communicating artery is identified. The aneurysm is somewhat bilobed in  appearance and measures 5.8 x 3.8 x 3.8 mm. The aneurysm neck measures  approximately 2.1 mm.     The left vertebral artery injection demonstrates a  dominant caliber and  a normal course in the cervical and intracranial segments. No cervical  AV fistula is seen. No posterior inferior cerebellar artery is seen with  a dominant anterior-inferior cerebellar artery supplying both  territories. The basilar artery is normal in caliber and terminates into  a normal P1 and P2 segment on the left with a duplicated left superior  cerebellar artery and single right superior cerebellar artery. No  aneurysms, hypervascular lesions or evidence of AV shunting is seen.     ENDOVASCULAR PROCEDURE:     Left Anterior Communicating Artery Aneurysm WEB Embolization - Upon  completion of the diagnostic portion of the exam, the vertebral artery  catheter was exchanged for a 6 [place] Cerebase DA guiding sheath. This  was positioned in the distal left ICA and once in position, a  combination of a 6 [place] Imelda EX intermediate catheter and VIA 17  microcatheter were coaxially placed into the circulation over a  [id number] microguidewire. Under roadmap conditions, the microcatheter  was positioned in the anterior communicating artery aneurysm. A 6 mm x 3  mm WEB SL device was then positioned in the aneurysm, but not detached.  Base catheter angiography was performed showing occlusion of the  aneurysm with good wall apposition and no herniation from the sac. The  WEB device was then detached without difficulty and early stagnation of  flow within the aneurysm was already identified. Final base catheter  angiography was performed showing no thromboembolic complications or  guide catheter trauma to the cervical ICA. The catheters and sheath were  removed as described above with the groin closure device placed. Patient  was discharged to the recovery room in stable condition. Follow-up  angiography is recommended in 6 months to assess for any aneurysm  regrowth.       Impression:      1. Left anterior communicating artery aneurysm as described above with  successful endovascular treatment  using the WEB intra-saccular device.  Follow-up angiography in 6 months.  2. No significant stenosis or narrowing within the cerebrovascular  circulation. There is a patent, but hypoplastic A1 segment on the right.     3. No additional aneurysms, hypervascular lesions or evidence of AV  shunting.     All carotid stenosis measurements were made using NASCET criteria.        This report was finalized on 1/17/2024 1:02 PM by Dr. Angelito Gamble M.D on Workstation: [id number]       CT Head Without Contrast [[id number]] Collected: 01/17/24 0524     Updated: 01/17/24 0524    Narrative:        Patient: TRAV MOHAMUD  Time Out: 05:24  Exam(s): CT HEAD Without Contrast     EXAM:    CT Head Without Intravenous Contrast    CLINICAL HISTORY:       Subarachnoid hemorrhage (SAH).    TECHNIQUE:    Axial computed tomography images of the head brain without intravenous   contrast.  CTDI is 55.18 mGy and DLP is 880.9 mGy-cm.  This CT exam was   performed according to the principle of ALARA (As Low As Reasonably   Achievable) by using one or more of the following dose reduction   techniques: automated exposure control, adjustment of the mA and or kV   according to patient size, and or use of iterative reconstruction   technique.    COMPARISON:    CT head 1 16 2024    FINDINGS:    Brain:  Stable bilateral subarachnoid hemorrhages.  Question minimal   subdural blood along the anterior falx.  No midline shift.  No   significant white matter disease.    Ventricles:  Unremarkable.  No ventriculomegaly.    Bones joints:  Unremarkable.  No acute fracture.    Soft tissues:  Small right frontal scalp hematoma.    Sinuses: There is fluid in the Suad sphenoid sinuses.  Moderate   mucosal thickening of the paranasal sinuses.  No acute sinusitis.    Mastoid air cells:  Unremarkable as visualized.  No mastoid effusion.    IMPRESSION:       1.  Stable bilateral subarachnoid hemorrhages.  2.  Question minimal subdural blood along the anterior  falx.  3.  Small right frontal scalp hematoma.      Impression:          Electronically signed by Rhianna Chi MD on 01-17-24 at 0524    CT Head Without Contrast [[id number]] Collected: 01/16/24 2053     Updated: 01/16/24 2101    Narrative:      CT HEAD WITHOUT CONTRAST     HISTORY: Status post embolization of an anterior communicating artery  aneurysm.     FINDINGS: Again identified is subarachnoid blood tracking along the  proximal aspects of the anterior and middle cerebral arteries. This is  again more prominent along the right MCA. The prior examination  demonstrated trace amount of subarachnoid blood overlying the right  frontal lobe laterally. Subarachnoid blood has increased overlying the  cerebral hemispheres laterally and superolaterally which is likely a  function of redistribution. No convincing new hemorrhage is identified.  Increased attenuation is appreciated consisting with a web occlusion  device at the expected location of the left A1/A2 aneurysm noted on the  CT angiogram. The temporal horns are mildly prominent, unchanged.       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 Subarachnoid blood appears similar in volume as  compared to the study from earlier same day with an element of  redistribution. There is no evidence of new hemorrhage or of acute  infarction. The temporal horns are mildly prominent, unchanged.     Radiation dose reduction techniques were utilized, including automated  exposure control and exposure modulation based on body size.        This report was finalized on 1/16/2024 8:58 PM by Dr. Faizan Nicole M.D  on Workstation: [id number]       CT Angiogram Head [[id number]] Collected: 01/16/24 1544     Updated: 01/16/24 1931    Narrative:      CT ANGIOGRAM NECK AND HEAD WITH CONTRAST     HISTORY: Intracranial hemorrhage, evaluate for aneurysm.     COMPARISON: CT head 01/16/2024.     FINDINGS: Initially, a noncontrasted CT  examination of the brain was  performed. Again identified is moderate subarachnoid blood tracking  along the proximal aspects of the anterior and middle cerebral arteries  most prominent along the right MCA. There is a small volume of blood  overlying the right frontal lobe laterally. This appears similar to the  prior examination. There is mild prominence of the temporal horns,  unchanged.     A CT angiogram of the neck and head was performed. Multiplanar as well  as 3-dimensional reconstructions were generated.     The great vessels are arranged in a classic configuration. There is 0%  stenosis of the internal carotid arteries bilaterally. The distal  aspects of the internal carotid arteries appear unremarkable. The right  A1 segment is hypoplastic. 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 The aneurysm is mildly irregular and ovoid  in configuration. It is most irregular anteriorly. The aneurysm measures  approximately 5 mm in the AP dimension, 3.5 mm in the transverse  dimension and 5 mm in the craniocaudal dimension.     Both vertebral arteries were opacified. The left vertebral artery is  slightly larger than that of the right. The basilar artery and left  posterior cerebral artery appear unremarkable. There is a fetal origin  of the right posterior cerebral artery.     There is moderate loss of disc height at C3-4 and C4-5.       Impression:      1.  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 It measures approximately 5 mm in size. The neck is not  optimally visualized but likely measures 3 mm. The aneurysm is irregular  anteriorly and projects anteriorly, medially and inferiorly. Note is  made that the right A1 segment is hypoplastic.  2.  The volume of subarachnoid blood as well as mild prominence of the  temporal horns is unchanged as compared to the earlier study.     The  above information was called to and discussed with RENNY Jean.        Radiation dose reduction techniques were utilized, including automated  exposure control and exposure modulation based on body size.        This report was finalized on 1/16/2024 7:28 PM by Dr. Faizan Nicole M.D  on Workstation: [id number]       CT Angiogram Neck [[id number]] Collected: 01/16/24 1544     Updated: 01/16/24 1931    Narrative:      CT ANGIOGRAM NECK AND HEAD WITH CONTRAST     HISTORY: Intracranial hemorrhage, evaluate for aneurysm.     COMPARISON: CT head 01/16/2024.     FINDINGS: Initially, a noncontrasted CT examination of the brain was  performed. Again identified is moderate subarachnoid blood tracking  along the proximal aspects of the anterior and middle cerebral arteries  most prominent along the right MCA. There is a small volume of blood  overlying the right frontal lobe laterally. This appears similar to the  prior examination. There is mild prominence of the temporal horns,  unchanged.     A CT angiogram of the neck and head was performed. Multiplanar as well  as 3-dimensional reconstructions were generated.     The great vessels are arranged in a classic configuration. There is 0%  stenosis of the internal carotid arteries bilaterally. The distal  aspects of the internal carotid arteries appear unremarkable. The right  A1 segment is hypoplastic. 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 The aneurysm is mildly irregular and ovoid  in configuration. It is most irregular anteriorly. The aneurysm measures  approximately 5 mm in the AP dimension, 3.5 mm in the transverse  dimension and 5 mm in the craniocaudal dimension.     Both vertebral arteries were opacified. The left vertebral artery is  slightly larger than that of the right. The basilar artery and left  posterior cerebral artery appear unremarkable. There  is a fetal origin  of the right posterior cerebral artery.     There is moderate loss of disc height at C3-4 and C4-5.       Impression:      1.  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 It measures approximately 5 mm in size. The neck is not  optimally visualized but likely measures 3 mm. The aneurysm is irregular  anteriorly and projects anteriorly, medially and inferiorly. Note is  made that the right A1 segment is hypoplastic.  2.  The volume of subarachnoid blood as well as mild prominence of the  temporal horns is unchanged as compared to the earlier study.     The above information was called to and discussed with RENNY Jean.        Radiation dose reduction techniques were utilized, including automated  exposure control and exposure modulation based on body size.        This report was finalized on 1/16/2024 7:28 PM by Dr. Faizan Nicole M.D  on Workstation: [id number]       CT Head Without Contrast [[id number]] Collected: 01/16/24 1315     Updated: 01/16/24 1403    Narrative:      EMERGENCY CT SCAN OF THE HEAD FACE AND CERVICAL SPINE ON 01/16/2024     CLINICAL HISTORY: Patient had a syncopal episode Friday night 01/12/2014  during which she fell into a sink, but before syncopal episodes patient  states she had sharp pain in back of head and shoulders, headache and  neck pain. Patient hit head and has ecchymosis in right periorbital  region.      HEAD CT TECHNIQUE: Spiral CT images were obtained from the base of the  skull to the vertex without intravenous contrast. The images were  reformatted and are submitted in 3 mm thick axial, sagittal and coronal  CT sections with brain algorithm and 2 mm thick axial CT sections with  high-resolution bone algorithm.      FINDINGS: The brain parenchyma is normal in attenuation. The lateral and  third ventricles are very minimally prominent in size and the temporal  horns are slightly prominent, upper limits of normal to perhaps  mildly  enlarged. I see no focal mass effect and no midline shift. There is  acute subarachnoid hemorrhage that fills the anterior right sylvian  fissure.  It tracks along the right M1 segment and also fills the  suprasellar cistern and extends along the margins of the [place] and also a small amount of subarachnoid hemorrhage tracks into  the left sylvian fissure. There is some subarachnoid hemorrhage tracking  into the right perimesencephalic region along the right anterior margin  of the maynor and midbrain. There is a tiny amount of intraventricular  hemorrhage in the fourth ventricle as well as within the posterior third  ventricle and a tiny amount in the posterior tip of the trigone of the  right lateral ventricle. There is a small scalp hematoma over the  anterior inferior right frontal bones extending into the right  periorbital region.  No underlying acute skull fracture is  identified.  There is partial opacification of the ethmoid sinuses and posterior  sphenoid sinuses with fluid in the posterior right maxillary sinus with  fluid that is minimally hyperdense.       Impression:      1. There is a small scalp hematoma over the anterior inferior right  frontal bone extending into the right periorbital region and no  underlying acute skull fracture is identified.   2. There is acute subarachnoid hemorrhage filling the anterior right  sylvian fissure tracking adjacent to the right middle cerebral artery  trifurcation and along the margins of the right M1 segment and also  extending into the suprasellar cistern along the margins of the [place] and tracking along the margin of the left M1 segment into the  anterior left sylvian fissure as well as tracking along the right  petroclinoid ligament into the right perimesencephalic region along the  anterolateral margin of the right side of the maynor and midbrain and into  the prepontine cistern.  There are tiny foci of intraventricular  hemorrhage  in the fourth ventricle and posterior third ventricle and  posterior tip of the trigone of the right lateral ventricle. The lateral  and third ventricles are very minimally prominent in size and the  temporal horns are slightly prominent in size, may be upper limits of  normal although very mild hydrocephalus is certainly not excluded. The  pattern of the subarachnoid hemorrhage raises the potential of a  ruptured aneurysm and I recommend neurosurgical consultation as well as  a CT angiogram of the head and ultimately a 6 vessel cerebral  arteriogram to further evaluate.   2. There is partial opacification of the ethmoid sinuses, posterior  sphenoid sinuses and posterior right maxillary sinus with fluid.  The  fluid in the right maxillary sinus is minimally hyperdense, could be  some hemorrhagic fluid. The remainder of the head CT is within normal  limits.        CT OF THE FACIAL BONES: Spiral CT images were obtained through the  facial bones in the axial imaging plane and the images were reformatted  and submitted in 2 mm thick axial, sagittal and coronal CT sections with  soft tissue algorithm and 1 mm thick axial, sagittal and coronal CT  sections with high-resolution bone algorithm.      FINDINGS: There is a small scalp hematoma over the anterior inferior  right frontal bone extending into the right periorbital region. No  underlying skull fracture is seen. There is partial opacification of the  frontal recesses, ethmoid sinuses and posterior sphenoid sinuses with  fluid as well as posterior and inferior right maxillary sinus with fluid  and mild mucosal thickening in the inferior medial left maxillary sinus.  There is some fluid in the superior nasal cavities. The fluid in the  posterior right maxillary sinus is minimally hyperdense and may be  hemorrhagic. I see no convincing acute facial fracture.  Reidentified is  the subarachnoid hemorrhage in the sylvian fissures and suprasellar  cistern and  perimesencephalic region.     IMPRESSION:  1. There is a small scalp hematoma over the anterior inferior right  frontal bone extending into the right periorbital region. No convincing  acute facial fracture is identified. There is partial opacification of  the frontal recesses, ethmoid sinuses, posterior sphenoid sinuses  bilaterally and posterior right maxillary sinus with fluid, and the  fluid in the posterior right maxillary sinus is minimally hyperdense,  may be hemorrhagic.   2. Reidentified is acute subarachnoid hemorrhage in the sylvian  fissures, right greater than left, suprasellar cistern and right  perimesencephalic region and a small amount of intraventricular  hemorrhage in the posterior fourth ventricle as better described above.   The pattern of subarachnoid hemorrhage raises the possibility of a  ruptured aneurysm and neurosurgical consultation is warranted in further  evaluation with CTA of the head and ultimately a 6 vessel cerebral  arteriogram is recommended.        CERVICAL SPINE CT TECHNIQUE: Spiral CT images were obtained from the  skull base down to the mid body of the T3 thoracic level. The images  were reformatted and are submitted in 2 mm thick axial and sagittal CT  sections with soft tissue algorithm and 1 mm thick axial, sagittal and  coronal CT sections with high-resolution bone algorithm.      FINDINGS: The atlantooccipital articulation is within normal limits.      At C1-2 there are minimal arthritic changes at the atlantodental  interval. Otherwise, the C1-2 level is normal in appearance.     At C2-3, the disc space, facets and uncovertebral joints are normal in  appearance with no canal or foraminal narrowing.      At C3-4 there is mild disc space narrowing, degenerative endplate  changes and minimal posterior spurring, but no canal narrowing.  There  is minimal right and there is moderate left facet overgrowth and some  left-sided uncovertebral joint hypertrophy and there is  moderate left  and no right foraminal narrowing.      At C4-5 there is disc space narrowing and degenerative endplate changes.   There is a mild diffuse posterior disc osteophyte complex and mild  canal narrowing.  There is some uncovertebral joint spurring into the  foramina and there is mild left and there is mild-to-moderate right bony  foraminal narrowing.      At C5-6 there is mild disc space narrowing, degenerative endplate  changes, mild posterior spurring and mild canal narrowing. The facets  are normal.  There is some bilateral uncovertebral joint hypertrophy and  there is mild right and mild-to-moderate left bony foraminal narrowing.      At C6-7 there is mild disc space narrowing, degenerative endplate  changes, mild posterior spurring and only minimal if any canal  narrowing.  The facets are normal.  There is some bilateral  uncovertebral joint hypertrophy and there is mild bilateral bony  foraminal narrowing.      At C7-T1 the posterior endplates and facets are normal with no canal or  foraminal narrowing.      No acute fracture is seen in the cervical spine.     IMPRESSION:     1. No acute fracture is seen in the cervical spine.  There is cervical  spondylosis as described.      Radiation dose reduction techniques were utilized, including automated  exposure control and exposure modulation based on body size.        This report was finalized on 1/16/2024 2:00 PM by Dr. Franklyn Baltazar M.D  on Workstation: [id number]       CT Cervical Spine Without Contrast [[id number]] Collected: 01/16/24 1315     Updated: 01/16/24 1403    Narrative:      EMERGENCY CT SCAN OF THE HEAD FACE AND CERVICAL SPINE ON 01/16/2024     CLINICAL HISTORY: Patient had a syncopal episode Friday night 01/12/2014  during which she fell into a sink, but before syncopal episodes patient  states she had sharp pain in back of head and shoulders, headache and  neck pain. Patient hit head and has ecchymosis in right periorbital  region.      HEAD  CT TECHNIQUE: Spiral CT images were obtained from the base of the  skull to the vertex without intravenous contrast. The images were  reformatted and are submitted in 3 mm thick axial, sagittal and coronal  CT sections with brain algorithm and 2 mm thick axial CT sections with  high-resolution bone algorithm.      FINDINGS: The brain parenchyma is normal in attenuation. The lateral and  third ventricles are very minimally prominent in size and the temporal  horns are slightly prominent, upper limits of normal to perhaps mildly  enlarged. I see no focal mass effect and no midline shift. There is  acute subarachnoid hemorrhage that fills the anterior right sylvian  fissure.  It tracks along the right M1 segment and also fills the  suprasellar cistern and extends along the margins of the [place] and also a small amount of subarachnoid hemorrhage tracks into  the left sylvian fissure. There is some subarachnoid hemorrhage tracking  into the right perimesencephalic region along the right anterior margin  of the maynor and midbrain. There is a tiny amount of intraventricular  hemorrhage in the fourth ventricle as well as within the posterior third  ventricle and a tiny amount in the posterior tip of the trigone of the  right lateral ventricle. There is a small scalp hematoma over the  anterior inferior right frontal bones extending into the right  periorbital region.  No underlying acute skull fracture is  identified.  There is partial opacification of the ethmoid sinuses and posterior  sphenoid sinuses with fluid in the posterior right maxillary sinus with  fluid that is minimally hyperdense.       Impression:      1. There is a small scalp hematoma over the anterior inferior right  frontal bone extending into the right periorbital region and no  underlying acute skull fracture is identified.   2. There is acute subarachnoid hemorrhage filling the anterior right  sylvian fissure tracking adjacent to the right  middle cerebral artery  trifurcation and along the margins of the right M1 segment and also  extending into the suprasellar cistern along the margins of the [place] and tracking along the margin of the left M1 segment into the  anterior left sylvian fissure as well as tracking along the right  petroclinoid ligament into the right perimesencephalic region along the  anterolateral margin of the right side of the maynor and midbrain and into  the prepontine cistern.  There are tiny foci of intraventricular  hemorrhage in the fourth ventricle and posterior third ventricle and  posterior tip of the trigone of the right lateral ventricle. The lateral  and third ventricles are very minimally prominent in size and the  temporal horns are slightly prominent in size, may be upper limits of  normal although very mild hydrocephalus is certainly not excluded. The  pattern of the subarachnoid hemorrhage raises the potential of a  ruptured aneurysm and I recommend neurosurgical consultation as well as  a CT angiogram of the head and ultimately a 6 vessel cerebral  arteriogram to further evaluate.   2. There is partial opacification of the ethmoid sinuses, posterior  sphenoid sinuses and posterior right maxillary sinus with fluid.  The  fluid in the right maxillary sinus is minimally hyperdense, could be  some hemorrhagic fluid. The remainder of the head CT is within normal  limits.        CT OF THE FACIAL BONES: Spiral CT images were obtained through the  facial bones in the axial imaging plane and the images were reformatted  and submitted in 2 mm thick axial, sagittal and coronal CT sections with  soft tissue algorithm and 1 mm thick axial, sagittal and coronal CT  sections with high-resolution bone algorithm.      FINDINGS: There is a small scalp hematoma over the anterior inferior  right frontal bone extending into the right periorbital region. No  underlying skull fracture is seen. There is partial opacification of  the  frontal recesses, ethmoid sinuses and posterior sphenoid sinuses with  fluid as well as posterior and inferior right maxillary sinus with fluid  and mild mucosal thickening in the inferior medial left maxillary sinus.  There is some fluid in the superior nasal cavities. The fluid in the  posterior right maxillary sinus is minimally hyperdense and may be  hemorrhagic. I see no convincing acute facial fracture.  Reidentified is  the subarachnoid hemorrhage in the sylvian fissures and suprasellar  cistern and perimesencephalic region.     IMPRESSION:  1. There is a small scalp hematoma over the anterior inferior right  frontal bone extending into the right periorbital region. No convincing  acute facial fracture is identified. There is partial opacification of  the frontal recesses, ethmoid sinuses, posterior sphenoid sinuses  bilaterally and posterior right maxillary sinus with fluid, and the  fluid in the posterior right maxillary sinus is minimally hyperdense,  may be hemorrhagic.   2. Reidentified is acute subarachnoid hemorrhage in the sylvian  fissures, right greater than left, suprasellar cistern and right  perimesencephalic region and a small amount of intraventricular  hemorrhage in the posterior fourth ventricle as better described above.   The pattern of subarachnoid hemorrhage raises the possibility of a  ruptured aneurysm and neurosurgical consultation is warranted in further  evaluation with CTA of the head and ultimately a 6 vessel cerebral  arteriogram is recommended.        CERVICAL SPINE CT TECHNIQUE: Spiral CT images were obtained from the  skull base down to the mid body of the T3 thoracic level. The images  were reformatted and are submitted in 2 mm thick axial and sagittal CT  sections with soft tissue algorithm and 1 mm thick axial, sagittal and  coronal CT sections with high-resolution bone algorithm.      FINDINGS: The atlantooccipital articulation is within normal limits.      At C1-2  there are minimal arthritic changes at the atlantodental  interval. Otherwise, the C1-2 level is normal in appearance.     At C2-3, the disc space, facets and uncovertebral joints are normal in  appearance with no canal or foraminal narrowing.      At C3-4 there is mild disc space narrowing, degenerative endplate  changes and minimal posterior spurring, but no canal narrowing.  There  is minimal right and there is moderate left facet overgrowth and some  left-sided uncovertebral joint hypertrophy and there is moderate left  and no right foraminal narrowing.      At C4-5 there is disc space narrowing and degenerative endplate changes.   There is a mild diffuse posterior disc osteophyte complex and mild  canal narrowing.  There is some uncovertebral joint spurring into the  foramina and there is mild left and there is mild-to-moderate right bony  foraminal narrowing.      At C5-6 there is mild disc space narrowing, degenerative endplate  changes, mild posterior spurring and mild canal narrowing. The facets  are normal.  There is some bilateral uncovertebral joint hypertrophy and  there is mild right and mild-to-moderate left bony foraminal narrowing.      At C6-7 there is mild disc space narrowing, degenerative endplate  changes, mild posterior spurring and only minimal if any canal  narrowing.  The facets are normal.  There is some bilateral  uncovertebral joint hypertrophy and there is mild bilateral bony  foraminal narrowing.      At C7-T1 the posterior endplates and facets are normal with no canal or  foraminal narrowing.      No acute fracture is seen in the cervical spine.     IMPRESSION:     1. No acute fracture is seen in the cervical spine.  There is cervical  spondylosis as described.      Radiation dose reduction techniques were utilized, including automated  exposure control and exposure modulation based on body size.        This report was finalized on 1/16/2024 2:00 PM by Dr. Franklyn Baltazar M.D  on  Workstation: [id number]       CT Facial Bones Without Contrast [[id number]] Collected: 01/16/24 1315     Updated: 01/16/24 1403    Narrative:      EMERGENCY CT SCAN OF THE HEAD FACE AND CERVICAL SPINE ON 01/16/2024     CLINICAL HISTORY: Patient had a syncopal episode Friday night 01/12/2014  during which she fell into a sink, but before syncopal episodes patient  states she had sharp pain in back of head and shoulders, headache and  neck pain. Patient hit head and has ecchymosis in right periorbital  region.      HEAD CT TECHNIQUE: Spiral CT images were obtained from the base of the  skull to the vertex without intravenous contrast. The images were  reformatted and are submitted in 3 mm thick axial, sagittal and coronal  CT sections with brain algorithm and 2 mm thick axial CT sections with  high-resolution bone algorithm.      FINDINGS: The brain parenchyma is normal in attenuation. The lateral and  third ventricles are very minimally prominent in size and the temporal  horns are slightly prominent, upper limits of normal to perhaps mildly  enlarged. I see no focal mass effect and no midline shift. There is  acute subarachnoid hemorrhage that fills the anterior right sylvian  fissure.  It tracks along the right M1 segment and also fills the  suprasellar cistern and extends along the margins of the [place] and also a small amount of subarachnoid hemorrhage tracks into  the left sylvian fissure. There is some subarachnoid hemorrhage tracking  into the right perimesencephalic region along the right anterior margin  of the maynor and midbrain. There is a tiny amount of intraventricular  hemorrhage in the fourth ventricle as well as within the posterior third  ventricle and a tiny amount in the posterior tip of the trigone of the  right lateral ventricle. There is a small scalp hematoma over the  anterior inferior right frontal bones extending into the right  periorbital region.  No underlying acute skull fracture  is  identified.  There is partial opacification of the ethmoid sinuses and posterior  sphenoid sinuses with fluid in the posterior right maxillary sinus with  fluid that is minimally hyperdense.       Impression:      1. There is a small scalp hematoma over the anterior inferior right  frontal bone extending into the right periorbital region and no  underlying acute skull fracture is identified.   2. There is acute subarachnoid hemorrhage filling the anterior right  sylvian fissure tracking adjacent to the right middle cerebral artery  trifurcation and along the margins of the right M1 segment and also  extending into the suprasellar cistern along the margins of the [place] and tracking along the margin of the left M1 segment into the  anterior left sylvian fissure as well as tracking along the right  petroclinoid ligament into the right perimesencephalic region along the  anterolateral margin of the right side of the maynor and midbrain and into  the prepontine cistern.  There are tiny foci of intraventricular  hemorrhage in the fourth ventricle and posterior third ventricle and  posterior tip of the trigone of the right lateral ventricle. The lateral  and third ventricles are very minimally prominent in size and the  temporal horns are slightly prominent in size, may be upper limits of  normal although very mild hydrocephalus is certainly not excluded. The  pattern of the subarachnoid hemorrhage raises the potential of a  ruptured aneurysm and I recommend neurosurgical consultation as well as  a CT angiogram of the head and ultimately a 6 vessel cerebral  arteriogram to further evaluate.   2. There is partial opacification of the ethmoid sinuses, posterior  sphenoid sinuses and posterior right maxillary sinus with fluid.  The  fluid in the right maxillary sinus is minimally hyperdense, could be  some hemorrhagic fluid. The remainder of the head CT is within normal  limits.        CT OF THE FACIAL BONES:  Spiral CT images were obtained through the  facial bones in the axial imaging plane and the images were reformatted  and submitted in 2 mm thick axial, sagittal and coronal CT sections with  soft tissue algorithm and 1 mm thick axial, sagittal and coronal CT  sections with high-resolution bone algorithm.      FINDINGS: There is a small scalp hematoma over the anterior inferior  right frontal bone extending into the right periorbital region. No  underlying skull fracture is seen. There is partial opacification of the  frontal recesses, ethmoid sinuses and posterior sphenoid sinuses with  fluid as well as posterior and inferior right maxillary sinus with fluid  and mild mucosal thickening in the inferior medial left maxillary sinus.  There is some fluid in the superior nasal cavities. The fluid in the  posterior right maxillary sinus is minimally hyperdense and may be  hemorrhagic. I see no convincing acute facial fracture.  Reidentified is  the subarachnoid hemorrhage in the sylvian fissures and suprasellar  cistern and perimesencephalic region.     IMPRESSION:  1. There is a small scalp hematoma over the anterior inferior right  frontal bone extending into the right periorbital region. No convincing  acute facial fracture is identified. There is partial opacification of  the frontal recesses, ethmoid sinuses, posterior sphenoid sinuses  bilaterally and posterior right maxillary sinus with fluid, and the  fluid in the posterior right maxillary sinus is minimally hyperdense,  may be hemorrhagic.   2. Reidentified is acute subarachnoid hemorrhage in the sylvian  fissures, right greater than left, suprasellar cistern and right  perimesencephalic region and a small amount of intraventricular  hemorrhage in the posterior fourth ventricle as better described above.   The pattern of subarachnoid hemorrhage raises the possibility of a  ruptured aneurysm and neurosurgical consultation is warranted in further  evaluation with  CTA of the head and ultimately a 6 vessel cerebral  arteriogram is recommended.        CERVICAL SPINE CT TECHNIQUE: Spiral CT images were obtained from the  skull base down to the mid body of the T3 thoracic level. The images  were reformatted and are submitted in 2 mm thick axial and sagittal CT  sections with soft tissue algorithm and 1 mm thick axial, sagittal and  coronal CT sections with high-resolution bone algorithm.      FINDINGS: The atlantooccipital articulation is within normal limits.      At C1-2 there are minimal arthritic changes at the atlantodental  interval. Otherwise, the C1-2 level is normal in appearance.     At C2-3, the disc space, facets and uncovertebral joints are normal in  appearance with no canal or foraminal narrowing.      At C3-4 there is mild disc space narrowing, degenerative endplate  changes and minimal posterior spurring, but no canal narrowing.  There  is minimal right and there is moderate left facet overgrowth and some  left-sided uncovertebral joint hypertrophy and there is moderate left  and no right foraminal narrowing.      At C4-5 there is disc space narrowing and degenerative endplate changes.   There is a mild diffuse posterior disc osteophyte complex and mild  canal narrowing.  There is some uncovertebral joint spurring into the  foramina and there is mild left and there is mild-to-moderate right bony  foraminal narrowing.      At C5-6 there is mild disc space narrowing, degenerative endplate  changes, mild posterior spurring and mild canal narrowing. The facets  are normal.  There is some bilateral uncovertebral joint hypertrophy and  there is mild right and mild-to-moderate left bony foraminal narrowing.      At C6-7 there is mild disc space narrowing, degenerative endplate  changes, mild posterior spurring and only minimal if any canal  narrowing.  The facets are normal.  There is some bilateral  uncovertebral joint hypertrophy and there is mild bilateral  "bony  foraminal narrowing.      At C7-T1 the posterior endplates and facets are normal with no canal or  foraminal narrowing.      No acute fracture is seen in the cervical spine.     IMPRESSION:     1. No acute fracture is seen in the cervical spine.  There is cervical  spondylosis as described.      Radiation dose reduction techniques were utilized, including automated  exposure control and exposure modulation based on body size.        This report was finalized on 1/16/2024 2:00 PM by Dr. Franklyn Baltazar M.D  on Workstation: [id number]               Pertinent Labs     Results from last 7 days   Lab Units 01/26/24  0550 01/25/24  0434 01/24/24  0501 01/23/24  0347   WBC 10*3/mm3 7.89 8.49 11.67* 13.07*   HEMOGLOBIN g/dL 10.5* 10.5* 10.8* 11.1*   PLATELETS 10*3/mm3 566* 487* 449 469*     Results from last 7 days   Lab Units 01/26/24  0550 01/25/24  0434 01/24/24  0501 01/23/24  0347   SODIUM mmol/L 138 135* 135* 137   POTASSIUM mmol/L 3.6 4.0 3.5 3.9   CHLORIDE mmol/L 104 104 102 102   CO2 mmol/L 22.8 21.6* 22.8 22.2   BUN mg/dL 5* 5* 6 6   CREATININE mg/dL 0.54* 0.51* 0.50* 0.54*   GLUCOSE mg/dL 107* 102* 102* 115*   Estimated Creatinine Clearance: 109.4 mL/min (A) (by C-G formula based on SCr of 0.54 mg/dL (L)).    Results from last 7 days   Lab Units 01/26/24  0550 01/25/24  0434 01/24/24  0501 01/23/24  0347   CALCIUM mg/dL 9.3 9.0 9.0 9.0               Invalid input(s): \"LDLCALC\"  Results from last 7 days   Lab Units 01/23/24  1147 01/23/24  1038   BLOODCX  No growth at 3 days No growth at 3 days       Test Results Pending at Discharge     Pending Labs       Order Current Status    Blood Culture - Blood, Arm, Left Preliminary result    Blood Culture - Blood, Blood, Central Line Preliminary result            Discharge Details        Discharge Medications        New Medications        Instructions Start Date   aspirin 81 MG EC tablet   81 mg, Oral, Daily   Start Date: January 27, 2024     atorvastatin 20 MG " tablet  Commonly known as: LIPITOR   20 mg, Oral, Nightly      midodrine 5 MG tablet  Commonly known as: PROAMATINE   5 mg, Oral, Every 8 Hours      niMODipine 30 MG capsule  Commonly known as: NIMOTOP   60 mg, Oral, Every 4 Hours             Continue These Medications        Instructions Start Date   cetirizine 5 MG tablet  Commonly known as: zyrTEC   5 mg, Oral, Daily      cholecalciferol 25 MCG (1000 UT) tablet  Commonly known as: VITAMIN D3   1,000 Units, Oral, Daily      vitamin B-12 100 MCG tablet  Commonly known as: CYANOCOBALAMIN   50 mcg, Oral, Daily             Stop These Medications      amLODIPine 5 MG tablet  Commonly known as: NORVASC     lisinopril-hydrochlorothiazide 20-25 MG per tablet  Commonly known as: PRINZIDE,ZESTORETIC              Allergies   Allergen Reactions    Augmentin [Amoxicillin-Pot Clavulanate] Rash     Developed rash on back after augmentin exposure. Med was held.    Latex Rash    Sulfa Antibiotics Rash         Discharge Disposition:  Home or Self Care    Discharge Diet:  Diet Order   Procedures    Diet: Regular/House Diet; Texture: Regular Texture (IDDSI 7); Fluid Consistency: Thin (IDDSI 0)       Discharge Activity:   As tolerated    CODE STATUS:    Code Status and Medical Interventions:   Ordered at: 01/16/24 1532     Code Status (Patient has no pulse and is not breathing):    CPR (Attempt to Resuscitate)     Medical Interventions (Patient has pulse or is breathing):    Full Support       No future appointments.   Follow-up Information       Carmenza Cuenca MD .    Specialty: Internal Medicine  Contact information:  [address]  [address]  [facility] [address] [phone]                             Time Spent on Discharge:  Greater than 30 minutes      Timoteo Wetzel MD  [facility]  01/26/24  14:32 EST

## 2024-01-26 NOTE — DISCHARGE INSTR - APPOINTMENTS
FOLLOW UP [facility] NEUROSURGERY  LUCIO CUELLAR, APRN  [phone]  FOLLOW UP APPOINTMENT IN 1 MONTH W/ CT

## 2024-01-26 NOTE — PROGRESS NOTES
[facility] NEUROSURGERY INTRACRANIAL PROGRESS NOTE    PATIENT IDENTIFICATION:   Name:  Binta Morales      MRN:  [id number]     58 y.o.  female               CC: SAH secondary to ruptured ACOM aneurysm postprocedure day 10 for embolization and successful web placement. spouse present at bedside.  HH 2 Black 3.      Subjective     Interval History: Did not get any sleep again last night.  Ready to go home.    ROS:  Constitutional: No fever, chills  HEENT: no headache, no vision changes, no tinnitus, no hearing difficulties  Neck: no stiffness  GI: No nausea, vomiting, no swallow difficulties  Neuro: No numbness, tingling, or weakness, no speech difficulties, no balance difficulties    Objective     Vital signs in last 24 hours:  Temp:  [99 °F (37.2 °C)-99.8 °F (37.7 °C)] 99 °F (37.2 °C)  Heart Rate:  [67-91] 79  Resp:  [16-18] 18  BP: (113-146)/(61-79) 120/66    Intake/Output this shift:  No intake/output data recorded.    Intake/Output last 3 shifts:  I/O last 3 completed shifts:  In: 100 [P.O.:100]  Out: -     LABS:  Results from last 7 days   Lab Units 01/26/24  0550 01/25/24  0434 01/24/24  0501   WBC 10*3/mm3 7.89 8.49 11.67*   HEMOGLOBIN g/dL 10.5* 10.5* 10.8*   HEMATOCRIT % 31.1* 30.5* 31.8*   PLATELETS 10*3/mm3 566* 487* 449       Results from last 7 days   Lab Units 01/26/24  0550 01/25/24  0434 01/24/24  0501   SODIUM mmol/L 138 135* 135*   POTASSIUM mmol/L 3.6 4.0 3.5   CHLORIDE mmol/L 104 104 102   CO2 mmol/L 22.8 21.6* 22.8   BUN mg/dL 5* 5* 6   CREATININE mg/dL 0.54* 0.51* 0.50*   GLUCOSE mg/dL 107* 102* 102*   CALCIUM mg/dL 9.3 9.0 9.0          IMAGING STUDIES:  No radiology results for the last day      I personally viewed and interpreted the patient's chart.    Meds reviewed    Physical exam  Alert, awake, oriented x3  Pupils equal round reactive to light  Extraocular muscles intact  Face symmetric  Speech is fluent and clear  No pronator drift  Motor exam  Bilateral deltoids 5/5, bilateral biceps 5/5,  "bilateral triceps 5/5, bilateral wrist extension 5/5 bilateral hand  5/5  Bilateral hip flexion 5/5, bilateral knee extension 5/5, bilateral DF/PF 5/5  gait deferred  Able to detect light touch in all 4 extremities  right orbital ecchymosis     Assessment & Plan     ASSESSMENT:      SAH (subarachnoid hemorrhage)    Ruptured aneurysm of anterior communicating artery    Communicating hydrocephalus    58-year-old female with SAH secondary to ruptured ACOM aneurysm postprocedure day 9 for embolization and successful web placement    PLAN:       TCD's this morning with great improvement.  Patient really wants to go home and feels she would do better if there so she can actually rest.  He has been instructed to return immediately should she develop any headache or strokelike symptoms.  Okay to change nimodipine to 60 mg every 4 as blood pressure has been stable on midodrine.  She can discontinue midodrine when the nimodipine course is complete.  follow-up with us in 1 month with CT head.  No need to continue Keppra as patient never had a seizure    I discussed the patient's findings and my recommendations with Dr. Duncan, patient, family, nursing staff, and primary care team    I spent 35 minutes caring for Binta Morales on this date of service. This time includes time spent by me in the following activities: preparing for the visit, reviewing tests, obtaining and/or reviewing a separately obtained history, performing a medically appropriate examination and/or evaluation, counseling and educating the patient/family/caregiver, ordering medications, tests, or procedures, referring and communicating with other health care professionals, documenting information in the medical record, independently interpreting results and communicating that information with the patient/family/caregiver, and care coordination          LOS: 10 days       Alma Delia Gutierrez, APRN  1/26/2024  15:33 EST    \"Dictated utilizing Dragon dictation\". "

## 2024-01-27 NOTE — OUTREACH NOTE
Prep Survey      Flowsheet Row Responses   [facility] facility patient discharged from? [facility]   Is LACE score < 7 ? No   Eligibility Readm Mgmt   Discharge diagnosis Subarachnoid hemorrhage   Does the patient have one of the following disease processes/diagnoses(primary or secondary)? Stroke   Does the patient have Home health ordered? Yes   What is the Home health agency?  [facility]    Is there a DME ordered? No   Prep survey completed? Yes            DENA KAMARA - Registered Nurse

## 2024-01-28 LAB
BACTERIA SPEC AEROBE CULT: NORMAL
BACTERIA SPEC AEROBE CULT: NORMAL

## 2024-01-28 NOTE — PAYOR COMM NOTE
"Trav Morales (58 y.o. Female)                      ATTENTION; DISCHARGE CASE REF #[id number]           Date of Birth   1965    Social Security Number       Address   [address] [place] [address]    Home Phone   [phone]    MRN   [id number]       [employer]    Marital Status                               Admission Date   24    Admission Type   Emergency    Admitting Provider   Chris Levine MD    Attending Provider       Department, Room/Bed   [address], S516/1       Discharge Date   2024    Discharge Disposition   Home or Self Care    Discharge Destination                                 Attending Provider: (none)   Allergies: Augmentin [Amoxicillin-pot Clavulanate], Latex, Sulfa Antibiotics    Isolation: None   Infection: None   Code Status: Prior    Ht: 165.1 cm (65\")   Wt: 69 kg (152 lb 1.9 oz)    Admission Cmt: None   Principal Problem: SAH (subarachnoid hemorrhage) [I60.9]                   Active Insurance as of 2024       Primary Coverage       Payor Plan Insurance Group Employer/Plan Group    [employer] [id number]       Payor Plan Address Payor Plan Phone Number Payor Plan Fax Number Effective Dates    [address] [phone]  2020 - None Entered    [facility] [id number]         Subscriber Name Subscriber Birth Date Member ID       TRAV MORALES 1965 [id number]                     Emergency Contacts        (Rel.) Home Phone Work Phone Mobile Phone    REJI Morales (Spouse) [phone] -- --                 Discharge Summary        Timoteo Wetzel MD at 24 1432              Patient Name: Trav Morales  : 1965  MRN: [id number]    Date of Admission: 2024  Date of Discharge:  2024  Primary Care Physician: Carmenza Cuenca MD      Chief Complaint:   Syncope and Head Injury      Discharge Diagnoses     Active Hospital Problems    Diagnosis  POA    **SAH (subarachnoid " hemorrhage) [I60.9]  Yes    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        Hospital Course     Ms. Morales is a 58 y.o. female with a history of hypertension presented after she hit her head on the toilet and lost consciousness.  Did not seek any medical attention at that time but began to have a headache and sharp electrical sensations going down her spine and came to the emergency room for further evaluation.  CT of the head showed extensive subarachnoid hemorrhage.  Neurosurgery was consulted and patient was found to have an ACOM aneurysm status post embolization on 1/16.  Stay has been complicated by vasospasms, and subsequent hypotension for treatment of vasospasms.  Have discussed with neurosurgery and plan to discharge patient on aspirin, statin, as well as nimodipine 60 mg every 4 hours for total 21 days and midodrine while she is on the nimodipine.  Patient to hold her home amlodipine/hydrochlorothiazide/lisinopril on discharge.  These can be slowly restarted in the future as needed after the nimodipine/midodrine course has been finished.    Patient had a PICC line that was placed due to difficulty lab draws and getting IV access.  This was removed on day of discharge.    During his admission patient also had a E. coli UTI that was treated with 3 days of ceftriaxone.    At the time of discharge patient was told to take all medications as prescribed, keep all follow-up appointments, and call their doctor or return to the hospital with any worsening or concerning symptoms.    Day of Discharge     Subjective:  Patient is sleep deprived because she is getting this medication every 2 hours.  Family at bedside.  Eager to go home when they are able to.    Review of Systems   Constitutional:  Negative for chills and fever.   Respiratory:  Negative for cough and shortness of breath.     Cardiovascular:  Negative for chest pain and leg swelling.   Gastrointestinal:  Negative for abdominal pain, diarrhea and nausea.       Physical Exam:  Temp:  [97.7 °F (36.5 °C)-99.8 °F (37.7 °C)] 99 °F (37.2 °C)  Heart Rate:  [67-91] 79  Resp:  [16-18] 18  BP: (113-146)/(55-79) 120/66  Body mass index is 25.31 kg/m².  Physical Exam  Constitutional:       General: She is not in acute distress.     Appearance: She is not ill-appearing.   Cardiovascular:      Rate and Rhythm: Normal rate and regular rhythm.   Pulmonary:      Effort: Pulmonary effort is normal. No respiratory distress.   Abdominal:      General: Abdomen is flat. There is no distension.      Tenderness: There is no abdominal tenderness.   Musculoskeletal:         General: No swelling or deformity. Normal range of motion.   Skin:     General: Skin is warm and dry.   Neurological:      General: No focal deficit present.      Mental Status: She is alert. Mental status is at baseline.   Consultants     Consult Orders (all) (From admission, onward)       Start     Ordered    01/24/24 0959  Inpatient Hospitalist Consult  Once        Specialty:  Hospitalist  Provider:  Cristofer Phillips MD    01/24/24 1000    01/21/24 0906  Inpatient IV Team Consult PICC 2 Lumens  Once        Provider:  (Not yet assigned)    01/21/24 0905    01/20/24 1208  Inpatient Neurology Consult Stroke  Once        Specialty:  Neurology  Provider:  Lux George MD    01/20/24 1211    01/17/24 0823  Notify Stroke Coordinator  Once        Provider:  (Not yet assigned)    01/17/24 0828    01/17/24 0823  Inpatient Rehab Admission Consult  Once        Provider:  (Not yet assigned)    01/17/24 0828    01/17/24 0823  Inpatient Case Management  Consult  Once        Provider:  (Not yet assigned)    01/17/24 0828    01/17/24 0823  Inpatient Diabetes Educator Consult  Once,   Status:  Canceled        Provider:  (Not yet assigned)    01/17/24 0828    01/16/24 4851   Pulmonology (on-call MD unless specified)  Once        Specialty:  Pulmonary Disease  Provider:  Chris Levine MD    01/16/24 1324    01/16/24 1300  Neurosurgery (on-call MD unless specified)  Once        Specialty:  Neurosurgery  Provider:  (Not yet assigned)    01/16/24 1259                  Procedures     Imaging Results (All)       Procedure Component Value Units Date/Time    SCANNED - IMAGING [[id number]] Resulted: 01/16/24     Updated: 01/26/24 1307    SCANNED - IMAGING [[id number]] Resulted: 01/16/24     Updated: 01/25/24 1345    SCANNED - IMAGING [[id number]] Resulted: 01/16/24     Updated: 01/24/24 1459    SCANNED - IMAGING [[id number]] Resulted: 01/16/24     Updated: 01/23/24 1409    SCANNED - IMAGING [[id number]] Resulted: 01/16/24     Updated: 01/22/24 1650    SCANNED - IMAGING [[id number]] Resulted: 01/16/24     Updated: 01/22/24 1650    SCANNED - IMAGING [[id number]] Resulted: 01/16/24     Updated: 01/22/24 1343    SCANNED - IMAGING [[id number]] Resulted: 01/16/24     Updated: 01/22/24 1343    SCANNED - IMAGING [[id number]] Resulted: 01/16/24     Updated: 01/22/24 1343    CT Head Without Contrast [[id number]] Collected: 01/20/24 0453     Updated: 01/20/24 0505    Narrative:      CT OF THE HEAD WITHOUT CONTRAST     HISTORY: Subarachnoid hemorrhage     COMPARISON: January 19, 2024     TECHNIQUE: Axial CT imaging was obtained through the brain. No IV  contrast was administered.     FINDINGS:  The patient is a small amount of bilateral subarachnoid hemorrhage. It  appears slightly less conspicuous on today's exam. Patient has undergone  embolization of an anterior communicating artery aneurysm. Axial images  suggest a tiny area of increased attenuation superior to the  embolization device. Image 16, series 2. It is not confirmed on the  sagittal and coronal images, and may be artifactual. However, consider  CTA for further assessment. Ventricles are normal in size. There is no  midline shift or mass effect. Old  left cerebellar infarct is noted.  Additional old infarct is noted within the left basal ganglia. Air-fluid  levels are noted within the paranasal sinuses, concerning for acute  sinusitis.          Impression:         1. Small amount of bilateral subarachnoid hemorrhage, perhaps slightly  less conspicuous.  2. Axial images suggest a tiny hyperdense focus superior to the  embolization coil. This is not confirmed on sagittal or coronal images,  and this may be artifactual, although CT angiography could be considered  for further assessment.  Radiation dose reduction techniques were utilized, including automated  exposure control and exposure modulation based on body size.        This report was finalized on 1/20/2024 5:02 AM by Dr. Gauri Feldman M.D on Workstation: [id number]       CT Head Without Contrast [[id number]] Collected: 01/19/24 0509     Updated: 01/19/24 0509    Narrative:        Patient: TRAV MOHAMUD  Time Out: 05:08  Exam(s): CT HEAD Without Contrast     EXAM:    CT Head Without Intravenous Contrast    CLINICAL HISTORY:       Evaluate hydrocephalus.    TECHNIQUE:    Axial computed tomography images of the head brain without intravenous   contrast.  CTDI is 55.7 mGy and DLP is 901 mGy-cm.  This CT exam was   performed according to the principle of ALARA (As Low As Reasonably   Achievable) by using one or more of the following dose reduction   techniques: automated exposure control, adjustment of the mA and or kV   according to patient size, and or use of iterative reconstruction   technique.    COMPARISON:    CT head 1 18 2024    FINDINGS:    Brain:  Stable bilateral subarachnoid hemorrhages.  No significant   white matter disease.  No mass-effect or midline shift.    Ventricles:  Improved minimal dilation of the temporal horns..    Bones joints:  Unremarkable.  No acute fracture.    Soft tissues:  Unremarkable.    Sinuses:  Unremarkable as visualized.  No acute sinusitis.    Mastoid air cells:   Unremarkable as visualized.  No mastoid effusion.    IMPRESSION:       1.  Stable bilateral subarachnoid hemorrhages.  2.  No mass-effect or midline shift.  3.  Improved minimal dilation of the temporal horns.      Impression:          Electronically signed by Rhianna Chi MD on 01-19-24 at 0508    SCANNED - IMAGING [[id number]] Resulted: 01/16/24     Updated: 01/18/24 1352    IR LUMBAR PUNCTURE DIAGNOSTIC [[id number]] Collected: 01/18/24 1241     Updated: 01/18/24 1245    Narrative:      FLUOROSCOPIC GUIDED LUMBAR PUNCTURE     HISTORY: communicating hydrocephalus- high volume LP; I60.9-Nontraumatic  subarachnoid hemorrhage, unspecified       TECHNIQUE:  Informed consent obtained. A pre-procedure timeout was performed  confirming correct patient and procedure.     The patient was placed on the fluoroscopy table, and the skin overlying  the lumbar spine was prepped and draped in the usual sterile fashion  with 2% chlorhexidine. 1% lidocaine was utilized for local anesthesia.     Next, a 20-gauge spinal needle was then advanced into the CSF space  under fluoroscopic guidance at the L3-4 level. Approximately 16 mL of  bloody CSF was then obtained and submitted to the laboratory for  evaluation. Opening pressure was 20 cm of water. Closing pressure was 7  cm of water.     The patient tolerated the procedure well without immediate complication.       Impression:      Technically successful fluoroscopically guided lumbar  puncture.     Reference air kerma: 18.1 mGy     This report was finalized on 1/18/2024 12:42 PM by Dr. Dennis Palumbo M.D on Workstation: [id number]       CT Head Without Contrast [[id number]] Collected: 01/18/24 0613     Updated: 01/18/24 0613    Narrative:        Patient: TRAV MOHAMUD  Time Out: 06:12  Exam(s): CT HEAD Without Contrast     EXAM:    CT Head Without Intravenous Contrast    CLINICAL HISTORY:     Reason for exam: SAH.    TECHNIQUE:    Axial computed tomography images of the head brain  without intravenous   contrast.  CTDI is 55.44 mGy and DLP is 922.9 mGy-cm.  This CT exam was   performed according to the principle of ALARA (As Low As Reasonably   Achievable) by using one or more of the following dose reduction   techniques: automated exposure control, adjustment of the mA and or kV   according to patient size, and or use of iterative reconstruction   technique.    COMPARISON:    1 17 2024    FINDINGS:    Brain: Postsurgical change from endovascular treatment of anterior   communicating artery aneurysm.  Unchanged distribution of subarachnoid   hemorrhage in the basilar cisterns and extending into the bilateral   sylvian fissures and inferior cerebral hemispheric sulci.  No new   hemorrhage.  No significant midline shift.  No edema.    Ventricles:  Unremarkable.  No ventriculomegaly.    Bones joints:  Unremarkable.  No acute fracture.    Soft tissues:  Unremarkable.    Sinuses:  Unremarkable as visualized.  No acute sinusitis.    Mastoid air cells:  Unremarkable as visualized.  No mastoid effusion.    IMPRESSION:       Stable postsurgical changes from anterior communicating artery aneurysm   treatment with subarachnoid hemorrhage centered within the basilar   cisterns.    Impression:          Electronically signed by Lux Monahan M.D. on 01-18-24 at 0612    XR Chest 1 View [[id number]] Collected: 01/17/24 2158     Updated: 01/17/24 2211    Narrative:      STAT PORTABLE VIEW OF THE CHEST ON 01/17/2024     CLINICAL HISTORY: Fever.      COMPARISON: This is correlated to a prior portable chest x-ray earlier  this afternoon on 01/17/2024 at 1:51 p.m.     FINDINGS: The cardiomediastinal silhouette and pulmonary vasculature are  within normal limits. There is some elevation of the right  hemidiaphragm. There is minimal patchy opacity in the left lung apex I  favor is an area of atelectasis over localized pneumonia. The remainder  of the lungs are clear. The costophrenic angles are sharp. There is  an  elevated right hemidiaphragm.       Impression:      1. There is a small patchy airspace opacity at the left apex that is new  when compared to chest x-ray earlier today 01/17/2024. I favor it is a  localized area of atelectasis over pneumonia but correlate clinically  and follow-up chest x-ray could be obtained to reevaluate if clinically  indicated. The remainder of  the chest x-ray is normal.      This report was finalized on 1/17/2024 10:08 PM by Dr. Franklyn Baltazar M.D  on Workstation: [id number]       XR Chest 1 View [[id number]] Collected: 01/17/24 1511     Updated: 01/17/24 1517    Narrative:      XR CHEST 1 VW-1/17/2024     HISTORY: Hypoxemia.     Heart size is within normal limits. Lungs appear free of acute  infiltrates. There is slight elevation of the right hemidiaphragm. Bony  structures appear unremarkable.       Impression:      1. No acute process.        This report was finalized on 1/17/2024 3:14 PM by Dr. Travon Shankar M.D on Workstation: [id number]       Arteriogram (Powerscribe) [[id number]] Collected: 01/17/24 1219     Updated: 01/17/24 1306    Narrative:      CEREBRAL ARTERIOGRAM WITH INTRACRANIAL EMBOLIZATION performed on  1/16/2024.     CLINICAL HISTORY: 58-year-old female with history of a subarachnoid  hemorrhage and ruptured anterior communicating artery aneurysm  approximately 3 days prior to presentation now with a Hunt and Snowden  grade 1 status and Black grade 4 CT scan. The patient is now for  emergent endovascular treatment.      TECHNIQUE: After obtaining informed consent, the patient was placed in a  supine position on the angiographic table and the right groin site was  then prepped and draped in usual sterile fashion with ChloraPrep soap.  Under ultrasound guidance with permanent images recorded, a 4 Filipino  micro-puncture set was used to access the right common femoral artery  through a dermatotomy. Through this access an 8 Filipino sheath was  positioned within the right groin.  Over an 035 Glidewire, a 5 Wallisian  vertebral artery catheter was placed into the circulation through the 8  Wallisian sheath and the following vessels were then selectively  catheterized:     1. Right internal carotid artery with digital subtraction imaging of the  intracranial runoff after contrast injection.  2. Right vertebral artery with digital subtraction imaging of the  cervical and intracranial runoff after contrast injection.  3. Left internal carotid artery with digital subtraction imaging of the  intracranial runoff after contrast injection including a rotational  angiogram with 3-D reconstruction at a separate workstation.  4. Left vertebral artery with digital subtraction imaging of the  cervical and intracranial runoff after contrast injection.     Upon completion of the diagnostic portion of the exam, the endovascular  procedure was performed as described below. At the conclusion of the  entire procedure, the catheters and sheath were removed with placement  of an 8 Wallisian Angio-Seal groin closure device. Once hemostasis was  achieved, the appropriate dressing was applied. No immediate  postprocedural complications were encountered. Approximately 40.7  minutes of fluoroscopy time were required delivering a [place]AP of 31599  uGym2. 230 cc of Visipaque 320 were employed for the procedure. Maximum  sterile barrier technique was performed throughout the entire procedure  according to current guidelines.     Surgeons: Dr. Angelito Gamble M.D.     FINDINGS: The right internal carotid artery injection demonstrates a  normal course and caliber in the cervical segment with the origin at the  C3 level with no significant stenosis by NASCET criteria. The  intracranial ICA is normal in course and caliber. The M1 segment is  normal in caliber, but the A1 segment is hypoplastic. The A1 segment is  however patent and does supply the A2 runoff as well as opacify the  anterior communicating artery transiently. The posterior  cerebral artery  is noted to be fetal in origin with no posterior communicating artery.  The MCA and JUAN runoffs show a normal arterial, capillary and venous  phase. No hypervascular lesions or AV shunting is seen. No cerebral  vasospasm is identified.      The right vertebral artery injection demonstrates a non-dominant  caliber, but a normal course in the cervical and intracranial segments.  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 The basilar artery is normal in caliber. No cervical spinal AV  shunting is seen. A dominant low origin PICA is appreciated. The  anterior inferior cerebellar artery is patent, but nondominant. No  aneurysms, hypervascular lesions or evidence of AV shunting identified  intracranially.     The left internal carotid artery injection demonstrates a normal course  and caliber in the cervical and intracranial segments. The origin occurs  at the C3 level with no atherosclerotic irregularity or stenosis by  NASCET criteria. The A1 and M1 segments are normal in caliber and the  MCA and JUAN runoffs show a normal arterial, capillary and venous phase  with no evidence of cerebral vasospasm. There is an anterior  communicating artery aneurysm present arising from the anterior  communicating artery itself at its junction with the A1 and A2 segments  on the left. No additional aneurysms are seen. No posterior  communicating artery is identified. The aneurysm is somewhat bilobed in  appearance and measures 5.8 x 3.8 x 3.8 mm. The aneurysm neck measures  approximately 2.1 mm.     The left vertebral artery injection demonstrates a dominant caliber and  a normal course in the cervical and intracranial segments. No cervical  AV fistula is seen. No posterior inferior cerebellar artery is seen with  a dominant anterior-inferior cerebellar artery supplying both  territories. The basilar artery is normal in caliber and terminates into  a normal P1 and P2 segment on the left  with a duplicated left superior  cerebellar artery and single right superior cerebellar artery. No  aneurysms, hypervascular lesions or evidence of AV shunting is seen.     ENDOVASCULAR PROCEDURE:     Left Anterior Communicating Artery Aneurysm WEB Embolization - Upon  completion of the diagnostic portion of the exam, the vertebral artery  catheter was exchanged for a 6 Ivorian Cerebase DA guiding sheath. This  was positioned in the distal left ICA and once in position, a  combination of a 6 Ivorian Imelda EX intermediate catheter and VIA 17  microcatheter were coaxially placed into the circulation over a  Fipehtas28 microguidewire. Under roadmap conditions, the microcatheter  was positioned in the anterior communicating artery aneurysm. A 6 mm x 3  mm WEB SL device was then positioned in the aneurysm, but not detached.  Base catheter angiography was performed showing occlusion of the  aneurysm with good wall apposition and no herniation from the sac. The  WEB device was then detached without difficulty and early stagnation of  flow within the aneurysm was already identified. Final base catheter  angiography was performed showing no thromboembolic complications or  guide catheter trauma to the cervical ICA. The catheters and sheath were  removed as described above with the groin closure device placed. Patient  was discharged to the recovery room in stable condition. Follow-up  angiography is recommended in 6 months to assess for any aneurysm  regrowth.       Impression:      1. Left anterior communicating artery aneurysm as described above with  successful endovascular treatment using the WEB intra-saccular device.  Follow-up angiography in 6 months.  2. No significant stenosis or narrowing within the cerebrovascular  circulation. There is a patent, but hypoplastic A1 segment on the right.     3. No additional aneurysms, hypervascular lesions or evidence of AV  shunting.     All carotid stenosis measurements were made  using NASCET criteria.        This report was finalized on 1/17/2024 1:02 PM by Dr. Angelito Gamble M.D on Workstation: [id number]       CT Head Without Contrast [[id number]] Collected: 01/17/24 0524     Updated: 01/17/24 0524    Narrative:        Patient: TRAV MOHAMUD  Time Out: 05:24  Exam(s): CT HEAD Without Contrast     EXAM:    CT Head Without Intravenous Contrast    CLINICAL HISTORY:       Subarachnoid hemorrhage (SAH).    TECHNIQUE:    Axial computed tomography images of the head brain without intravenous   contrast.  CTDI is 55.18 mGy and DLP is 880.9 mGy-cm.  This CT exam was   performed according to the principle of ALARA (As Low As Reasonably   Achievable) by using one or more of the following dose reduction   techniques: automated exposure control, adjustment of the mA and or kV   according to patient size, and or use of iterative reconstruction   technique.    COMPARISON:    CT head 1 16 2024    FINDINGS:    Brain:  Stable bilateral subarachnoid hemorrhages.  Question minimal   subdural blood along the anterior falx.  No midline shift.  No   significant white matter disease.    Ventricles:  Unremarkable.  No ventriculomegaly.    Bones joints:  Unremarkable.  No acute fracture.    Soft tissues:  Small right frontal scalp hematoma.    Sinuses: There is fluid in the Suad sphenoid sinuses.  Moderate   mucosal thickening of the paranasal sinuses.  No acute sinusitis.    Mastoid air cells:  Unremarkable as visualized.  No mastoid effusion.    IMPRESSION:       1.  Stable bilateral subarachnoid hemorrhages.  2.  Question minimal subdural blood along the anterior falx.  3.  Small right frontal scalp hematoma.      Impression:          Electronically signed by Rhianna Chi MD on 01-17-24 at 0524    CT Head Without Contrast [[id number]] Collected: 01/16/24 2053     Updated: 01/16/24 2101    Narrative:      CT HEAD WITHOUT CONTRAST     HISTORY: Status post embolization of an anterior communicating  artery  aneurysm.     FINDINGS: Again identified is subarachnoid blood tracking along the  proximal aspects of the anterior and middle cerebral arteries. This is  again more prominent along the right MCA. The prior examination  demonstrated trace amount of subarachnoid blood overlying the right  frontal lobe laterally. Subarachnoid blood has increased overlying the  cerebral hemispheres laterally and superolaterally which is likely a  function of redistribution. No convincing new hemorrhage is identified.  Increased attenuation is appreciated consisting with a web occlusion  device at the expected location of the left A1/A2 aneurysm noted on the  CT angiogram. The temporal horns are mildly prominent, unchanged.       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 Subarachnoid blood appears similar in volume as  compared to the study from earlier same day with an element of  redistribution. There is no evidence of new hemorrhage or of acute  infarction. The temporal horns are mildly prominent, unchanged.     Radiation dose reduction techniques were utilized, including automated  exposure control and exposure modulation based on body size.        This report was finalized on 1/16/2024 8:58 PM by Dr. Faizan Nicole M.D  on Workstation: [id number]       CT Angiogram Head [[id number]] Collected: 01/16/24 1544     Updated: 01/16/24 1931    Narrative:      CT ANGIOGRAM NECK AND HEAD WITH CONTRAST     HISTORY: Intracranial hemorrhage, evaluate for aneurysm.     COMPARISON: CT head 01/16/2024.     FINDINGS: Initially, a noncontrasted CT examination of the brain was  performed. Again identified is moderate subarachnoid blood tracking  along the proximal aspects of the anterior and middle cerebral arteries  most prominent along the right MCA. There is a small volume of blood  overlying the right frontal lobe laterally. This appears similar to the  prior examination. There is  mild prominence of the temporal horns,  unchanged.     A CT angiogram of the neck and head was performed. Multiplanar as well  as 3-dimensional reconstructions were generated.     The great vessels are arranged in a classic configuration. There is 0%  stenosis of the internal carotid arteries bilaterally. The distal  aspects of the internal carotid arteries appear unremarkable. The right  A1 segment is hypoplastic. 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 The aneurysm is mildly irregular and ovoid  in configuration. It is most irregular anteriorly. The aneurysm measures  approximately 5 mm in the AP dimension, 3.5 mm in the transverse  dimension and 5 mm in the craniocaudal dimension.     Both vertebral arteries were opacified. The left vertebral artery is  slightly larger than that of the right. The basilar artery and left  posterior cerebral artery appear unremarkable. There is a fetal origin  of the right posterior cerebral artery.     There is moderate loss of disc height at C3-4 and C4-5.       Impression:      1.  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 It measures approximately 5 mm in size. The neck is not  optimally visualized but likely measures 3 mm. The aneurysm is irregular  anteriorly and projects anteriorly, medially and inferiorly. Note is  made that the right A1 segment is hypoplastic.  2.  The volume of subarachnoid blood as well as mild prominence of the  temporal horns is unchanged as compared to the earlier study.     The above information was called to and discussed with RENNY Jean.        Radiation dose reduction techniques were utilized, including automated  exposure control and exposure modulation based on body size.        This report was finalized on 1/16/2024 7:28 PM by Dr. Faizan Nicole M.D  on Workstation: [id number]       CT Angiogram Neck  [[id number]] Collected: 01/16/24 1544     Updated: 01/16/24 1931    Narrative:      CT ANGIOGRAM NECK AND HEAD WITH CONTRAST     HISTORY: Intracranial hemorrhage, evaluate for aneurysm.     COMPARISON: CT head 01/16/2024.     FINDINGS: Initially, a noncontrasted CT examination of the brain was  performed. Again identified is moderate subarachnoid blood tracking  along the proximal aspects of the anterior and middle cerebral arteries  most prominent along the right MCA. There is a small volume of blood  overlying the right frontal lobe laterally. This appears similar to the  prior examination. There is mild prominence of the temporal horns,  unchanged.     A CT angiogram of the neck and head was performed. Multiplanar as well  as 3-dimensional reconstructions were generated.     The great vessels are arranged in a classic configuration. There is 0%  stenosis of the internal carotid arteries bilaterally. The distal  aspects of the internal carotid arteries appear unremarkable. The right  A1 segment is hypoplastic. 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 The aneurysm is mildly irregular and ovoid  in configuration. It is most irregular anteriorly. The aneurysm measures  approximately 5 mm in the AP dimension, 3.5 mm in the transverse  dimension and 5 mm in the craniocaudal dimension.     Both vertebral arteries were opacified. The left vertebral artery is  slightly larger than that of the right. The basilar artery and left  posterior cerebral artery appear unremarkable. There is a fetal origin  of the right posterior cerebral artery.     There is moderate loss of disc height at C3-4 and C4-5.       Impression:      1.  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 It measures approximately 5 mm in size. The neck is not  optimally visualized but likely measures 3 mm. The aneurysm is  irregular  anteriorly and projects anteriorly, medially and inferiorly. Note is  made that the right A1 segment is hypoplastic.  2.  The volume of subarachnoid blood as well as mild prominence of the  temporal horns is unchanged as compared to the earlier study.     The above information was called to and discussed with RENNY Jean.        Radiation dose reduction techniques were utilized, including automated  exposure control and exposure modulation based on body size.        This report was finalized on 1/16/2024 7:28 PM by Dr. Faizan Nicole M.D  on Workstation: [id number]       CT Head Without Contrast [[id number]] Collected: 01/16/24 1315     Updated: 01/16/24 1403    Narrative:      EMERGENCY CT SCAN OF THE HEAD FACE AND CERVICAL SPINE ON 01/16/2024     CLINICAL HISTORY: Patient had a syncopal episode Friday night 01/12/2014  during which she fell into a sink, but before syncopal episodes patient  states she had sharp pain in back of head and shoulders, headache and  neck pain. Patient hit head and has ecchymosis in right periorbital  region.      HEAD CT TECHNIQUE: Spiral CT images were obtained from the base of the  skull to the vertex without intravenous contrast. The images were  reformatted and are submitted in 3 mm thick axial, sagittal and coronal  CT sections with brain algorithm and 2 mm thick axial CT sections with  high-resolution bone algorithm.      FINDINGS: The brain parenchyma is normal in attenuation. The lateral and  third ventricles are very minimally prominent in size and the temporal  horns are slightly prominent, upper limits of normal to perhaps mildly  enlarged. I see no focal mass effect and no midline shift. There is  acute subarachnoid hemorrhage that fills the anterior right sylvian  fissure.  It tracks along the right M1 segment and also fills the  suprasellar cistern and extends along the margins of the [place] and also a small amount of subarachnoid hemorrhage  tracks into  the left sylvian fissure. There is some subarachnoid hemorrhage tracking  into the right perimesencephalic region along the right anterior margin  of the maynor and midbrain. There is a tiny amount of intraventricular  hemorrhage in the fourth ventricle as well as within the posterior third  ventricle and a tiny amount in the posterior tip of the trigone of the  right lateral ventricle. There is a small scalp hematoma over the  anterior inferior right frontal bones extending into the right  periorbital region.  No underlying acute skull fracture is  identified.  There is partial opacification of the ethmoid sinuses and posterior  sphenoid sinuses with fluid in the posterior right maxillary sinus with  fluid that is minimally hyperdense.       Impression:      1. There is a small scalp hematoma over the anterior inferior right  frontal bone extending into the right periorbital region and no  underlying acute skull fracture is identified.   2. There is acute subarachnoid hemorrhage filling the anterior right  sylvian fissure tracking adjacent to the right middle cerebral artery  trifurcation and along the margins of the right M1 segment and also  extending into the suprasellar cistern along the margins of the [place] and tracking along the margin of the left M1 segment into the  anterior left sylvian fissure as well as tracking along the right  petroclinoid ligament into the right perimesencephalic region along the  anterolateral margin of the right side of the maynor and midbrain and into  the prepontine cistern.  There are tiny foci of intraventricular  hemorrhage in the fourth ventricle and posterior third ventricle and  posterior tip of the trigone of the right lateral ventricle. The lateral  and third ventricles are very minimally prominent in size and the  temporal horns are slightly prominent in size, may be upper limits of  normal although very mild hydrocephalus is certainly not excluded.  The  pattern of the subarachnoid hemorrhage raises the potential of a  ruptured aneurysm and I recommend neurosurgical consultation as well as  a CT angiogram of the head and ultimately a 6 vessel cerebral  arteriogram to further evaluate.   2. There is partial opacification of the ethmoid sinuses, posterior  sphenoid sinuses and posterior right maxillary sinus with fluid.  The  fluid in the right maxillary sinus is minimally hyperdense, could be  some hemorrhagic fluid. The remainder of the head CT is within normal  limits.        CT OF THE FACIAL BONES: Spiral CT images were obtained through the  facial bones in the axial imaging plane and the images were reformatted  and submitted in 2 mm thick axial, sagittal and coronal CT sections with  soft tissue algorithm and 1 mm thick axial, sagittal and coronal CT  sections with high-resolution bone algorithm.      FINDINGS: There is a small scalp hematoma over the anterior inferior  right frontal bone extending into the right periorbital region. No  underlying skull fracture is seen. There is partial opacification of the  frontal recesses, ethmoid sinuses and posterior sphenoid sinuses with  fluid as well as posterior and inferior right maxillary sinus with fluid  and mild mucosal thickening in the inferior medial left maxillary sinus.  There is some fluid in the superior nasal cavities. The fluid in the  posterior right maxillary sinus is minimally hyperdense and may be  hemorrhagic. I see no convincing acute facial fracture.  Reidentified is  the subarachnoid hemorrhage in the sylvian fissures and suprasellar  cistern and perimesencephalic region.     IMPRESSION:  1. There is a small scalp hematoma over the anterior inferior right  frontal bone extending into the right periorbital region. No convincing  acute facial fracture is identified. There is partial opacification of  the frontal recesses, ethmoid sinuses, posterior sphenoid sinuses  bilaterally and posterior  right maxillary sinus with fluid, and the  fluid in the posterior right maxillary sinus is minimally hyperdense,  may be hemorrhagic.   2. Reidentified is acute subarachnoid hemorrhage in the sylvian  fissures, right greater than left, suprasellar cistern and right  perimesencephalic region and a small amount of intraventricular  hemorrhage in the posterior fourth ventricle as better described above.   The pattern of subarachnoid hemorrhage raises the possibility of a  ruptured aneurysm and neurosurgical consultation is warranted in further  evaluation with CTA of the head and ultimately a 6 vessel cerebral  arteriogram is recommended.        CERVICAL SPINE CT TECHNIQUE: Spiral CT images were obtained from the  skull base down to the mid body of the T3 thoracic level. The images  were reformatted and are submitted in 2 mm thick axial and sagittal CT  sections with soft tissue algorithm and 1 mm thick axial, sagittal and  coronal CT sections with high-resolution bone algorithm.      FINDINGS: The atlantooccipital articulation is within normal limits.      At C1-2 there are minimal arthritic changes at the atlantodental  interval. Otherwise, the C1-2 level is normal in appearance.     At C2-3, the disc space, facets and uncovertebral joints are normal in  appearance with no canal or foraminal narrowing.      At C3-4 there is mild disc space narrowing, degenerative endplate  changes and minimal posterior spurring, but no canal narrowing.  There  is minimal right and there is moderate left facet overgrowth and some  left-sided uncovertebral joint hypertrophy and there is moderate left  and no right foraminal narrowing.      At C4-5 there is disc space narrowing and degenerative endplate changes.   There is a mild diffuse posterior disc osteophyte complex and mild  canal narrowing.  There is some uncovertebral joint spurring into the  foramina and there is mild left and there is mild-to-moderate right bony  foraminal  narrowing.      At C5-6 there is mild disc space narrowing, degenerative endplate  changes, mild posterior spurring and mild canal narrowing. The facets  are normal.  There is some bilateral uncovertebral joint hypertrophy and  there is mild right and mild-to-moderate left bony foraminal narrowing.      At C6-7 there is mild disc space narrowing, degenerative endplate  changes, mild posterior spurring and only minimal if any canal  narrowing.  The facets are normal.  There is some bilateral  uncovertebral joint hypertrophy and there is mild bilateral bony  foraminal narrowing.      At C7-T1 the posterior endplates and facets are normal with no canal or  foraminal narrowing.      No acute fracture is seen in the cervical spine.     IMPRESSION:     1. No acute fracture is seen in the cervical spine.  There is cervical  spondylosis as described.      Radiation dose reduction techniques were utilized, including automated  exposure control and exposure modulation based on body size.        This report was finalized on 1/16/2024 2:00 PM by Dr. Franklyn Baltazar M.D  on Workstation: [id number]       CT Cervical Spine Without Contrast [[id number]] Collected: 01/16/24 1315     Updated: 01/16/24 1403    Narrative:      EMERGENCY CT SCAN OF THE HEAD FACE AND CERVICAL SPINE ON 01/16/2024     CLINICAL HISTORY: Patient had a syncopal episode Friday night 01/12/2014  during which she fell into a sink, but before syncopal episodes patient  states she had sharp pain in back of head and shoulders, headache and  neck pain. Patient hit head and has ecchymosis in right periorbital  region.      HEAD CT TECHNIQUE: Spiral CT images were obtained from the base of the  skull to the vertex without intravenous contrast. The images were  reformatted and are submitted in 3 mm thick axial, sagittal and coronal  CT sections with brain algorithm and 2 mm thick axial CT sections with  high-resolution bone algorithm.      FINDINGS: The brain parenchyma is  normal in attenuation. The lateral and  third ventricles are very minimally prominent in size and the temporal  horns are slightly prominent, upper limits of normal to perhaps mildly  enlarged. I see no focal mass effect and no midline shift. There is  acute subarachnoid hemorrhage that fills the anterior right sylvian  fissure.  It tracks along the right M1 segment and also fills the  suprasellar cistern and extends along the margins of the [place] and also a small amount of subarachnoid hemorrhage tracks into  the left sylvian fissure. There is some subarachnoid hemorrhage tracking  into the right perimesencephalic region along the right anterior margin  of the maynor and midbrain. There is a tiny amount of intraventricular  hemorrhage in the fourth ventricle as well as within the posterior third  ventricle and a tiny amount in the posterior tip of the trigone of the  right lateral ventricle. There is a small scalp hematoma over the  anterior inferior right frontal bones extending into the right  periorbital region.  No underlying acute skull fracture is  identified.  There is partial opacification of the ethmoid sinuses and posterior  sphenoid sinuses with fluid in the posterior right maxillary sinus with  fluid that is minimally hyperdense.       Impression:      1. There is a small scalp hematoma over the anterior inferior right  frontal bone extending into the right periorbital region and no  underlying acute skull fracture is identified.   2. There is acute subarachnoid hemorrhage filling the anterior right  sylvian fissure tracking adjacent to the right middle cerebral artery  trifurcation and along the margins of the right M1 segment and also  extending into the suprasellar cistern along the margins of the [place]  [place] and tracking along the margin of the left M1 segment into the  anterior left sylvian fissure as well as tracking along the right  petroclinoid ligament into the right  perimesencephalic region along the  anterolateral margin of the right side of the maynor and midbrain and into  the prepontine cistern.  There are tiny foci of intraventricular  hemorrhage in the fourth ventricle and posterior third ventricle and  posterior tip of the trigone of the right lateral ventricle. The lateral  and third ventricles are very minimally prominent in size and the  temporal horns are slightly prominent in size, may be upper limits of  normal although very mild hydrocephalus is certainly not excluded. The  pattern of the subarachnoid hemorrhage raises the potential of a  ruptured aneurysm and I recommend neurosurgical consultation as well as  a CT angiogram of the head and ultimately a 6 vessel cerebral  arteriogram to further evaluate.   2. There is partial opacification of the ethmoid sinuses, posterior  sphenoid sinuses and posterior right maxillary sinus with fluid.  The  fluid in the right maxillary sinus is minimally hyperdense, could be  some hemorrhagic fluid. The remainder of the head CT is within normal  limits.        CT OF THE FACIAL BONES: Spiral CT images were obtained through the  facial bones in the axial imaging plane and the images were reformatted  and submitted in 2 mm thick axial, sagittal and coronal CT sections with  soft tissue algorithm and 1 mm thick axial, sagittal and coronal CT  sections with high-resolution bone algorithm.      FINDINGS: There is a small scalp hematoma over the anterior inferior  right frontal bone extending into the right periorbital region. No  underlying skull fracture is seen. There is partial opacification of the  frontal recesses, ethmoid sinuses and posterior sphenoid sinuses with  fluid as well as posterior and inferior right maxillary sinus with fluid  and mild mucosal thickening in the inferior medial left maxillary sinus.  There is some fluid in the superior nasal cavities. The fluid in the  posterior right maxillary sinus is minimally  hyperdense and may be  hemorrhagic. I see no convincing acute facial fracture.  Reidentified is  the subarachnoid hemorrhage in the sylvian fissures and suprasellar  cistern and perimesencephalic region.     IMPRESSION:  1. There is a small scalp hematoma over the anterior inferior right  frontal bone extending into the right periorbital region. No convincing  acute facial fracture is identified. There is partial opacification of  the frontal recesses, ethmoid sinuses, posterior sphenoid sinuses  bilaterally and posterior right maxillary sinus with fluid, and the  fluid in the posterior right maxillary sinus is minimally hyperdense,  may be hemorrhagic.   2. Reidentified is acute subarachnoid hemorrhage in the sylvian  fissures, right greater than left, suprasellar cistern and right  perimesencephalic region and a small amount of intraventricular  hemorrhage in the posterior fourth ventricle as better described above.   The pattern of subarachnoid hemorrhage raises the possibility of a  ruptured aneurysm and neurosurgical consultation is warranted in further  evaluation with CTA of the head and ultimately a 6 vessel cerebral  arteriogram is recommended.        CERVICAL SPINE CT TECHNIQUE: Spiral CT images were obtained from the  skull base down to the mid body of the T3 thoracic level. The images  were reformatted and are submitted in 2 mm thick axial and sagittal CT  sections with soft tissue algorithm and 1 mm thick axial, sagittal and  coronal CT sections with high-resolution bone algorithm.      FINDINGS: The atlantooccipital articulation is within normal limits.      At C1-2 there are minimal arthritic changes at the atlantodental  interval. Otherwise, the C1-2 level is normal in appearance.     At C2-3, the disc space, facets and uncovertebral joints are normal in  appearance with no canal or foraminal narrowing.      At C3-4 there is mild disc space narrowing, degenerative endplate  changes and minimal  posterior spurring, but no canal narrowing.  There  is minimal right and there is moderate left facet overgrowth and some  left-sided uncovertebral joint hypertrophy and there is moderate left  and no right foraminal narrowing.      At C4-5 there is disc space narrowing and degenerative endplate changes.   There is a mild diffuse posterior disc osteophyte complex and mild  canal narrowing.  There is some uncovertebral joint spurring into the  foramina and there is mild left and there is mild-to-moderate right bony  foraminal narrowing.      At C5-6 there is mild disc space narrowing, degenerative endplate  changes, mild posterior spurring and mild canal narrowing. The facets  are normal.  There is some bilateral uncovertebral joint hypertrophy and  there is mild right and mild-to-moderate left bony foraminal narrowing.      At C6-7 there is mild disc space narrowing, degenerative endplate  changes, mild posterior spurring and only minimal if any canal  narrowing.  The facets are normal.  There is some bilateral  uncovertebral joint hypertrophy and there is mild bilateral bony  foraminal narrowing.      At C7-T1 the posterior endplates and facets are normal with no canal or  foraminal narrowing.      No acute fracture is seen in the cervical spine.     IMPRESSION:     1. No acute fracture is seen in the cervical spine.  There is cervical  spondylosis as described.      Radiation dose reduction techniques were utilized, including automated  exposure control and exposure modulation based on body size.        This report was finalized on 1/16/2024 2:00 PM by Dr. Franklyn Baltazar M.D  on Workstation: [id number]       CT Facial Bones Without Contrast [[id number]] Collected: 01/16/24 1315     Updated: 01/16/24 1403    Narrative:      EMERGENCY CT SCAN OF THE HEAD FACE AND CERVICAL SPINE ON 01/16/2024     CLINICAL HISTORY: Patient had a syncopal episode Friday night 01/12/2014  during which she fell into a sink, but before  syncopal episodes patient  states she had sharp pain in back of head and shoulders, headache and  neck pain. Patient hit head and has ecchymosis in right periorbital  region.      HEAD CT TECHNIQUE: Spiral CT images were obtained from the base of the  skull to the vertex without intravenous contrast. The images were  reformatted and are submitted in 3 mm thick axial, sagittal and coronal  CT sections with brain algorithm and 2 mm thick axial CT sections with  high-resolution bone algorithm.      FINDINGS: The brain parenchyma is normal in attenuation. The lateral and  third ventricles are very minimally prominent in size and the temporal  horns are slightly prominent, upper limits of normal to perhaps mildly  enlarged. I see no focal mass effect and no midline shift. There is  acute subarachnoid hemorrhage that fills the anterior right sylvian  fissure.  It tracks along the right M1 segment and also fills the  suprasellar cistern and extends along the margins of the [place] and also a small amount of subarachnoid hemorrhage tracks into  the left sylvian fissure. There is some subarachnoid hemorrhage tracking  into the right perimesencephalic region along the right anterior margin  of the maynor and midbrain. There is a tiny amount of intraventricular  hemorrhage in the fourth ventricle as well as within the posterior third  ventricle and a tiny amount in the posterior tip of the trigone of the  right lateral ventricle. There is a small scalp hematoma over the  anterior inferior right frontal bones extending into the right  periorbital region.  No underlying acute skull fracture is  identified.  There is partial opacification of the ethmoid sinuses and posterior  sphenoid sinuses with fluid in the posterior right maxillary sinus with  fluid that is minimally hyperdense.       Impression:      1. There is a small scalp hematoma over the anterior inferior right  frontal bone extending into the right periorbital  region and no  underlying acute skull fracture is identified.   2. There is acute subarachnoid hemorrhage filling the anterior right  sylvian fissure tracking adjacent to the right middle cerebral artery  trifurcation and along the margins of the right M1 segment and also  extending into the suprasellar cistern along the margins of the [place]  of [place] and tracking along the margin of the left M1 segment into the  anterior left sylvian fissure as well as tracking along the right  petroclinoid ligament into the right perimesencephalic region along the  anterolateral margin of the right side of the maynor and midbrain and into  the prepontine cistern.  There are tiny foci of intraventricular  hemorrhage in the fourth ventricle and posterior third ventricle and  posterior tip of the trigone of the right lateral ventricle. The lateral  and third ventricles are very minimally prominent in size and the  temporal horns are slightly prominent in size, may be upper limits of  normal although very mild hydrocephalus is certainly not excluded. The  pattern of the subarachnoid hemorrhage raises the potential of a  ruptured aneurysm and I recommend neurosurgical consultation as well as  a CT angiogram of the head and ultimately a 6 vessel cerebral  arteriogram to further evaluate.   2. There is partial opacification of the ethmoid sinuses, posterior  sphenoid sinuses and posterior right maxillary sinus with fluid.  The  fluid in the right maxillary sinus is minimally hyperdense, could be  some hemorrhagic fluid. The remainder of the head CT is within normal  limits.        CT OF THE FACIAL BONES: Spiral CT images were obtained through the  facial bones in the axial imaging plane and the images were reformatted  and submitted in 2 mm thick axial, sagittal and coronal CT sections with  soft tissue algorithm and 1 mm thick axial, sagittal and coronal CT  sections with high-resolution bone algorithm.      FINDINGS: There is a small  scalp hematoma over the anterior inferior  right frontal bone extending into the right periorbital region. No  underlying skull fracture is seen. There is partial opacification of the  frontal recesses, ethmoid sinuses and posterior sphenoid sinuses with  fluid as well as posterior and inferior right maxillary sinus with fluid  and mild mucosal thickening in the inferior medial left maxillary sinus.  There is some fluid in the superior nasal cavities. The fluid in the  posterior right maxillary sinus is minimally hyperdense and may be  hemorrhagic. I see no convincing acute facial fracture.  Reidentified is  the subarachnoid hemorrhage in the sylvian fissures and suprasellar  cistern and perimesencephalic region.     IMPRESSION:  1. There is a small scalp hematoma over the anterior inferior right  frontal bone extending into the right periorbital region. No convincing  acute facial fracture is identified. There is partial opacification of  the frontal recesses, ethmoid sinuses, posterior sphenoid sinuses  bilaterally and posterior right maxillary sinus with fluid, and the  fluid in the posterior right maxillary sinus is minimally hyperdense,  may be hemorrhagic.   2. Reidentified is acute subarachnoid hemorrhage in the sylvian  fissures, right greater than left, suprasellar cistern and right  perimesencephalic region and a small amount of intraventricular  hemorrhage in the posterior fourth ventricle as better described above.   The pattern of subarachnoid hemorrhage raises the possibility of a  ruptured aneurysm and neurosurgical consultation is warranted in further  evaluation with CTA of the head and ultimately a 6 vessel cerebral  arteriogram is recommended.        CERVICAL SPINE CT TECHNIQUE: Spiral CT images were obtained from the  skull base down to the mid body of the T3 thoracic level. The images  were reformatted and are submitted in 2 mm thick axial and sagittal CT  sections with soft tissue algorithm and  1 mm thick axial, sagittal and  coronal CT sections with high-resolution bone algorithm.      FINDINGS: The atlantooccipital articulation is within normal limits.      At C1-2 there are minimal arthritic changes at the atlantodental  interval. Otherwise, the C1-2 level is normal in appearance.     At C2-3, the disc space, facets and uncovertebral joints are normal in  appearance with no canal or foraminal narrowing.      At C3-4 there is mild disc space narrowing, degenerative endplate  changes and minimal posterior spurring, but no canal narrowing.  There  is minimal right and there is moderate left facet overgrowth and some  left-sided uncovertebral joint hypertrophy and there is moderate left  and no right foraminal narrowing.      At C4-5 there is disc space narrowing and degenerative endplate changes.   There is a mild diffuse posterior disc osteophyte complex and mild  canal narrowing.  There is some uncovertebral joint spurring into the  foramina and there is mild left and there is mild-to-moderate right bony  foraminal narrowing.      At C5-6 there is mild disc space narrowing, degenerative endplate  changes, mild posterior spurring and mild canal narrowing. The facets  are normal.  There is some bilateral uncovertebral joint hypertrophy and  there is mild right and mild-to-moderate left bony foraminal narrowing.      At C6-7 there is mild disc space narrowing, degenerative endplate  changes, mild posterior spurring and only minimal if any canal  narrowing.  The facets are normal.  There is some bilateral  uncovertebral joint hypertrophy and there is mild bilateral bony  foraminal narrowing.      At C7-T1 the posterior endplates and facets are normal with no canal or  foraminal narrowing.      No acute fracture is seen in the cervical spine.     IMPRESSION:     1. No acute fracture is seen in the cervical spine.  There is cervical  spondylosis as described.      Radiation dose reduction techniques were  "utilized, including automated  exposure control and exposure modulation based on body size.        This report was finalized on 1/16/2024 2:00 PM by Dr. Franklyn Baltazar M.D  on Workstation: [id number]               Pertinent Labs     Results from last 7 days   Lab Units 01/26/24  0550 01/25/24  0434 01/24/24  0501 01/23/24  0347   WBC 10*3/mm3 7.89 8.49 11.67* 13.07*   HEMOGLOBIN g/dL 10.5* 10.5* 10.8* 11.1*   PLATELETS 10*3/mm3 566* 487* 449 469*     Results from last 7 days   Lab Units 01/26/24  0550 01/25/24  0434 01/24/24  0501 01/23/24  0347   SODIUM mmol/L 138 135* 135* 137   POTASSIUM mmol/L 3.6 4.0 3.5 3.9   CHLORIDE mmol/L 104 104 102 102   CO2 mmol/L 22.8 21.6* 22.8 22.2   BUN mg/dL 5* 5* 6 6   CREATININE mg/dL 0.54* 0.51* 0.50* 0.54*   GLUCOSE mg/dL 107* 102* 102* 115*   Estimated Creatinine Clearance: 109.4 mL/min (A) (by C-G formula based on SCr of 0.54 mg/dL (L)).    Results from last 7 days   Lab Units 01/26/24  0550 01/25/24  0434 01/24/24  0501 01/23/24  0347   CALCIUM mg/dL 9.3 9.0 9.0 9.0               Invalid input(s): \"LDLCALC\"  Results from last 7 days   Lab Units 01/23/24  1147 01/23/24  1038   BLOODCX  No growth at 3 days No growth at 3 days       Test Results Pending at Discharge     Pending Labs       Order Current Status    Blood Culture - Blood, Arm, Left Preliminary result    Blood Culture - Blood, Blood, Central Line Preliminary result            Discharge Details        Discharge Medications        New Medications        Instructions Start Date   aspirin 81 MG EC tablet   81 mg, Oral, Daily   Start Date: January 27, 2024     atorvastatin 20 MG tablet  Commonly known as: LIPITOR   20 mg, Oral, Nightly      midodrine 5 MG tablet  Commonly known as: PROAMATINE   5 mg, Oral, Every 8 Hours      niMODipine 30 MG capsule  Commonly known as: NIMOTOP   60 mg, Oral, Every 4 Hours             Continue These Medications        Instructions Start Date   cetirizine 5 MG tablet  Commonly known as: " zyrTEC   5 mg, Oral, Daily      cholecalciferol 25 MCG (1000 UT) tablet  Commonly known as: VITAMIN D3   1,000 Units, Oral, Daily      vitamin B-12 100 MCG tablet  Commonly known as: CYANOCOBALAMIN   50 mcg, Oral, Daily             Stop These Medications      amLODIPine 5 MG tablet  Commonly known as: NORVASC     lisinopril-hydrochlorothiazide 20-25 MG per tablet  Commonly known as: PRINZIDE,ZESTORETIC              Allergies   Allergen Reactions    Augmentin [Amoxicillin-Pot Clavulanate] Rash     Developed rash on back after augmentin exposure. Med was held.    Latex Rash    Sulfa Antibiotics Rash         Discharge Disposition:  Home or Self Care    Discharge Diet:  Diet Order   Procedures    Diet: Regular/House Diet; Texture: Regular Texture (IDDSI 7); Fluid Consistency: Thin (IDDSI 0)       Discharge Activity:   As tolerated    CODE STATUS:    Code Status and Medical Interventions:   Ordered at: 01/16/24 1532     Code Status (Patient has no pulse and is not breathing):    CPR (Attempt to Resuscitate)     Medical Interventions (Patient has pulse or is breathing):    Full Support       No future appointments.   Follow-up Information       Carmenza Cuenca MD .    Specialty: Internal Medicine  Contact information:  [address]  [address]  [place] [address]  [phone]                             Time Spent on Discharge:  Greater than 30 minutes      Marine Barreto MD  [facility]  01/26/24  14:32 EST                Electronically signed by Marine Barreto MD at 01/26/24 2103       Discharge Order (From admission, onward)       Start     Ordered    01/26/24 1428  Discharge patient  Once        Expected Discharge Date: 01/26/24   Discharge Disposition: Home or Self Care   Physician of Record for Attribution - Please select from Treatment Team: MARINE BARRETO [[id number]]   Review needed by CMO to determine Physician of Record: No      Question Answer Comment   Physician of Record for  Attribution - Please select from Treatment Team MARINE BARRETO    Review needed by CMO to determine Physician of Record No        01/26/24 0812

## 2024-01-29 NOTE — PROGRESS NOTES
Discharge Planning Assessment  [facility]     Patient Name: Binta Morales  MRN: [id number]  Today's Date: 1/29/2024    Admit Date: 1/16/2024    Plan: Home with spouse and [facility]   Discharge Needs Assessment    No documentation.                  Discharge Plan       Row Name 01/29/24 1734       Plan    Plan Home with spouse and [facility]    Final Discharge Disposition Code 06 - home with home health care    Final Note Home with [facility]                  Continued Care and Services - Discharged on 1/26/2024 Admission date: 1/16/2024 - Discharge disposition: Home or Self Care      Home Medical Care       Service Provider Request Status Selected Services Address Phone Fax Patient Preferred    [facility]  Selected Home Health Services [address] [address] [phone] --    [facility] Declined  Patient Insurance Not Accepted N/A [address] [facility] [address] [id number] [phone] --    [facility] Declined  Out of network N/A [id number] [address]  [facility] [address] [facility] [address] [phone] --    [facility] Declined  CAPACITY N/A [address], [facility] [id number] [phone] --    [facility]  Insurance Denial N/A [address] [facility] [facility] [id number] [phone] --    [facility]  Insurance Denial N/A [address] [facility] [address] [id number] [phone] --                  Expected Discharge Date and Time       Expected Discharge Date Expected Discharge Time    Jan 26, 2024            Demographic Summary    No documentation.                  Functional Status    No documentation.                  Psychosocial    No documentation.                  Abuse/Neglect    No documentation.                  Legal    No  documentation.                  Substance Abuse    No documentation.                  Patient Forms    No documentation.                     Wilda Hidalgo RN

## 2024-01-30 ENCOUNTER — READMISSION MANAGEMENT (OUTPATIENT)
Dept: CALL CENTER | Facility: HOSPITAL | Age: 59
End: 2024-01-30
Payer: COMMERCIAL

## 2024-01-30 NOTE — OUTREACH NOTE
Stroke Week 1 Survey      Flowsheet Row Responses   [facility] patient discharged from? [place]   Does the patient have one of the following disease processes/diagnoses(primary or secondary)? Stroke   Week 1 attempt successful? Yes   Call start time 1149   Call end time 1157   Discharge diagnosis Subarachnoid hemorrhage   Person spoke with today (if not patient) and relationship spouse   Meds reviewed with patient/caregiver? Yes   Is the patient having any side effects they believe may be caused by any medication additions or changes? No   Does the patient have all medications ordered at discharge? Yes   Is the patient taking all medications as directed (includes completed medication regime)? Yes   Does the patient have a primary care provider?  Yes   Does the patient have an appointment with their PCP within 7 days of discharge? Yes   Has the patient kept scheduled appointments due by today? Yes   Comments Neurosurgery 2/27/24   What is the [facility] health agency?  [facility]   Has home health visited the patient within 72 hours of discharge? Call prior to 72 hours   Psychosocial issues? No   Does the patient require any assistance with activities of daily living such as eating, bathing, dressing, walking, etc.? Yes   Does the patient have any residual symptoms from stroke/TIA? No   Does the patient understand the diet ordered at discharge? Yes   Did the patient receive a copy of their discharge instructions? Yes   Nursing interventions Reviewed instructions with patient   What is the patient's perception of their health status since discharge? Improving   Nursing interventions Nurse provided patient education   Is the patient/caregiver able to teach back the risk factors for a stroke? High blood pressure-goal below 120/80, High Cholesterol, Physical inactivity and obesity, History of TIAs   Is the patient/caregiver able to teach back signs and symptoms related to disease process for when to call PCP? Yes   Is  the patient/caregiver able to teach back signs and symptoms related to disease process for when to call 911? Yes   If the patient is a current smoker, are they able to teach back resources for cessation? Not a smoker   Is the patient/caregiver able to teach back the hierarchy of who to call/visit for symptoms/problems? PCP, Specialist, Home health nurse, Urgent Care, ED, 911 Yes   Is the patient able to teach back FAST for Stroke? T teetee: Call 9-1-1 right away, S peech: Listen for slurred speech, A rm: Check if one arm is weak, F ace: Look for an uneven smile, E yes: Check for vision loss, B alance: Watch for sudden loss of balance   Week 1 call completed? Yes   Is the patient interested in additional calls from an ambulatory ? No   Would this patient benefit from a Referral to [facility]? No   Wrap up additional comments Spouse states pt is doing better, and reports pt has weakness, and no other deficits. Reviewed AVS/meds with spouse. Pt had PCP fu appt, and has upcoming post-op appt on 2/27/24.   Call end time 1157            uJana ARTEAGA - Registered Nurse

## 2024-02-06 ENCOUNTER — READMISSION MANAGEMENT (OUTPATIENT)
Dept: CALL CENTER | Facility: HOSPITAL | Age: 59
End: 2024-02-06
Payer: COMMERCIAL

## 2024-02-06 NOTE — OUTREACH NOTE
Stroke Week 2 Survey      Flowsheet Row Responses   [facility] facility patient discharged from? [place]   Does the patient have one of the following disease processes/diagnoses(primary or secondary)? Stroke   Week 2 attempt successful? No   Unsuccessful attempts Attempt 1            Rita BENTLEY - Licensed Nurse

## 2024-02-07 ENCOUNTER — TELEPHONE (OUTPATIENT)
Dept: NEUROSURGERY | Facility: CLINIC | Age: 59
End: 2024-02-07
Payer: COMMERCIAL

## 2024-02-09 ENCOUNTER — TELEPHONE (OUTPATIENT)
Dept: NEUROSURGERY | Facility: CLINIC | Age: 59
End: 2024-02-09

## 2024-02-09 NOTE — TELEPHONE ENCOUNTER
PT  [employer] CALLED HIM AND SAID CT SCAN DENIED. STATES PROVIDER GAVE NO REASON OF WHY CT WAS NEEDED.     PLEASE CALL TO DISCUSS/ADVISE    THANK YOU

## 2024-02-12 ENCOUNTER — READMISSION MANAGEMENT (OUTPATIENT)
Dept: CALL CENTER | Facility: HOSPITAL | Age: 59
End: 2024-02-12
Payer: COMMERCIAL

## 2024-02-12 ENCOUNTER — TELEPHONE (OUTPATIENT)
Dept: NEUROSURGERY | Facility: CLINIC | Age: 59
End: 2024-02-12
Payer: COMMERCIAL

## 2024-02-12 NOTE — TELEPHONE ENCOUNTER
Spoke to Veronica with [employer] and [employer] has denied the CT Head W/O Contrast being done on 02/20/24, she would like to know what to do next for the patient please call and advise thanks!

## 2024-02-20 ENCOUNTER — TELEPHONE (OUTPATIENT)
Dept: NEUROSURGERY | Facility: CLINIC | Age: 59
End: 2024-02-20

## 2024-02-20 NOTE — TELEPHONE ENCOUNTER
Spoke with patient's  and he stated the mess up was on [facility] end where they did not have a tech to do the CT scan that was order.    stated it was approved by [employer] and [facility] did not want to explain what happened. Patient is now reschedule for Friday 2/23 @ 3:30pm

## 2024-02-20 NOTE — TELEPHONE ENCOUNTER
Richelle called from [facility] stating that patients CT was partially approved. The scan itself was approved but the location was denied- originally the auth was submitted for [facility]. She said that she is unable to obtain auth due to partial approval and that the office would have to call the insurance and change the location    If you have any questions please call Richelle [phone]  EXT [phone]

## 2024-02-21 ENCOUNTER — READMISSION MANAGEMENT (OUTPATIENT)
Dept: CALL CENTER | Facility: HOSPITAL | Age: 59
End: 2024-02-21
Payer: COMMERCIAL

## 2024-02-21 NOTE — H&P (VIEW-ONLY)
Subjective   History of Present Illness: Binta Morales is a 58 y.o. female is here today for follow-up with a new head CT.  She is doing well today.  She was previously seen in the hospital with a subarachnoid hemorrhage from a ruptured anterior communicating artery aneurysm on January 16, 2024.  She has made a great recovery from her subarachnoid hemorrhage.  She denies any persistence of severe headaches.  She reports occasional mild to moderate frontal headaches.  She reports that she is close to back to baseline.  Denies any recent strokelike episodes.  Denies any seizure-like episodes.  She reports that she has had a tremor since her hemorrhage.    History of Present Illness    The following portions of the patient's history were reviewed and updated as appropriate: allergies, current medications, past family history, past medical history, past social history, past surgical history, and problem list.    Past Medical History:   Diagnosis Date    Hypertension         Past Surgical History:   Procedure Laterality Date    EMBOLIZATION CEREBRAL N/A 1/16/2024    Procedure: CEREBRAL ANGIOGRAM WITH EMBOLIZATION;  Surgeon: Angelito Gamble MD;  Location: [facility] 18/19;  Service: Interventional Radiology;  Laterality: N/A;          Current Outpatient Medications:     amLODIPine (NORVASC) 5 MG tablet, Take 1 tablet by mouth Daily., Disp: , Rfl:     cetirizine (zyrTEC) 5 MG tablet, Take 1 tablet by mouth Daily., Disp: , Rfl:     Cholecalciferol 25 MCG (1000 UT) tablet, Take 1 tablet by mouth Daily., Disp: , Rfl:     lisinopril-hydrochlorothiazide (PRINZIDE,ZESTORETIC) 20-25 MG per tablet, Take 1 tablet by mouth Daily., Disp: , Rfl:     vitamin B-12 (CYANOCOBALAMIN) 100 MCG tablet, Take 0.5 tablets by mouth Daily., Disp: , Rfl:     midodrine (PROAMATINE) 5 MG tablet, Take 1 tablet by mouth Every 8 (Eight) Hours for 30 days., Disp: 90 tablet, Rfl: 0    niMODipine (NIMOTOP) 30 MG capsule, Take 2 capsules by mouth  "Every 4 (Four) Hours for 12 days., Disp: 144 capsule, Rfl: 0     Allergies   Allergen Reactions    Augmentin [Amoxicillin-Pot Clavulanate] Rash     Developed rash on back after augmentin exposure. Med was held.    Latex Rash    Sulfa Antibiotics Rash        Social History     Socioeconomic History    Marital status:    Tobacco Use    Smoking status: Never    Smokeless tobacco: Never   Vaping Use    Vaping Use: Never used   Substance and Sexual Activity    Alcohol use: Yes     Comment: rare, social    Drug use: Never        Family History   Problem Relation Age of Onset    Cerebral aneurysm Neg Hx         Review of Systems   Eyes:  Negative for visual disturbance.   Neurological:  Negative for dizziness, speech difficulty, light-headedness and numbness.   All other systems reviewed and are negative.      Objective     Vitals:    24 1312   BP: 122/80   Pulse: 91   Temp: 98 °F (36.7 °C)   SpO2: 99%   Weight: 69.5 kg (153 lb 3.2 oz)   Height: 165.1 cm (65\")     Body mass index is 25.49 kg/m².      Physical Exam  Neurologic Exam  Awake, alert, oriented x3  Pupils equal round reactive to light  Extraocular muscles intact  Face symmetric  Speech is fluent and clear  No pronator drift  Motor exam  Bilateral deltoids 5/5, bilateral biceps 5/5, bilateral triceps 5/5, bilateral wrist extension 5/5 bilateral hand  5/5  Bilateral hip flexion 5/5, bilateral knee extension 5/5, bilateral DF/PF 5/5  No clonus  No Raj's reflex  Steady normal gait  Able to detect  light touch in all 4 extremities          Assessment & Plan   Independent Review of Radiographic Studies:      I personally reviewed the images from the following studies.  CT head without contrast from 2024  The CT images were reviewed and demonstrate no evidence of hydrocephalus.  No residual subarachnoid hemorrhage.    Medical Decision Makin-year-old female s/p cerebral angiogram with embolization of a ruptured anterior " communicating artery aneurysm on January 16, 2024   -She has made a great recovery since her hemorrhage.  I will plan to have her follow-up in 3 to 4 weeks with a repeat diagnostic angiogram to evaluate for any residual or recurrent aneurysm.        Diagnoses and all orders for this visit:    1. Brain aneurysm (Primary)  -     IR Arch & 3 Vessel Head; Future  -     Obtain Informed Consent; Standing  -     Insert Peripheral IV; Standing  -     Saline Lock & Maintain IV Access; Standing  -     sodium chloride 0.9 % flush 10 mL  -     sodium chloride 0.9 % flush 10 mL  -     sodium chloride 0.9 % infusion 40 mL  -     Basic Metabolic Panel; Standing  -     CBC & Differential; Standing    2. History of subarachnoid hemorrhage  -     IR Arch & 3 Vessel Head; Future  -     Obtain Informed Consent; Standing  -     Insert Peripheral IV; Standing  -     Saline Lock & Maintain IV Access; Standing  -     sodium chloride 0.9 % flush 10 mL  -     sodium chloride 0.9 % flush 10 mL  -     sodium chloride 0.9 % infusion 40 mL  -     Basic Metabolic Panel; Standing  -     CBC & Differential; Standing      Return For a diagnostic angiogram.  I spent 30 minutes reviewing the medical record, reviewing the CT images, discussing the management of intracranial aneurysms, discussing the importance of avoiding nicotine and tobacco products.

## 2024-02-21 NOTE — PROGRESS NOTES
Subjective   History of Present Illness: Binta Morales is a 58 y.o. female is here today for follow-up with a new head CT.  She is doing well today.  She was previously seen in the hospital with a subarachnoid hemorrhage from a ruptured anterior communicating artery aneurysm on January 16, 2024.  She has made a great recovery from her subarachnoid hemorrhage.  She denies any persistence of severe headaches.  She reports occasional mild to moderate frontal headaches.  She reports that she is close to back to baseline.  Denies any recent strokelike episodes.  Denies any seizure-like episodes.  She reports that she has had a tremor since her hemorrhage.    History of Present Illness    The following portions of the patient's history were reviewed and updated as appropriate: allergies, current medications, past family history, past medical history, past social history, past surgical history, and problem list.    Past Medical History:   Diagnosis Date    Hypertension         Past Surgical History:   Procedure Laterality Date    EMBOLIZATION CEREBRAL N/A 1/16/2024    Procedure: CEREBRAL ANGIOGRAM WITH EMBOLIZATION;  Surgeon: Angelito Gamble MD;  Location: [facility] 18/19;  Service: Interventional Radiology;  Laterality: N/A;          Current Outpatient Medications:     amLODIPine (NORVASC) 5 MG tablet, Take 1 tablet by mouth Daily., Disp: , Rfl:     cetirizine (zyrTEC) 5 MG tablet, Take 1 tablet by mouth Daily., Disp: , Rfl:     Cholecalciferol 25 MCG (1000 UT) tablet, Take 1 tablet by mouth Daily., Disp: , Rfl:     lisinopril-hydrochlorothiazide (PRINZIDE,ZESTORETIC) 20-25 MG per tablet, Take 1 tablet by mouth Daily., Disp: , Rfl:     vitamin B-12 (CYANOCOBALAMIN) 100 MCG tablet, Take 0.5 tablets by mouth Daily., Disp: , Rfl:     midodrine (PROAMATINE) 5 MG tablet, Take 1 tablet by mouth Every 8 (Eight) Hours for 30 days., Disp: 90 tablet, Rfl: 0    niMODipine (NIMOTOP) 30 MG capsule, Take 2 capsules by mouth  "Every 4 (Four) Hours for 12 days., Disp: 144 capsule, Rfl: 0     Allergies   Allergen Reactions    Augmentin [Amoxicillin-Pot Clavulanate] Rash     Developed rash on back after augmentin exposure. Med was held.    Latex Rash    Sulfa Antibiotics Rash        Social History     Socioeconomic History    Marital status:    Tobacco Use    Smoking status: Never    Smokeless tobacco: Never   Vaping Use    Vaping Use: Never used   Substance and Sexual Activity    Alcohol use: Yes     Comment: rare, social    Drug use: Never        Family History   Problem Relation Age of Onset    Cerebral aneurysm Neg Hx         Review of Systems   Eyes:  Negative for visual disturbance.   Neurological:  Negative for dizziness, speech difficulty, light-headedness and numbness.   All other systems reviewed and are negative.      Objective     Vitals:    24 1312   BP: 122/80   Pulse: 91   Temp: 98 °F (36.7 °C)   SpO2: 99%   Weight: 69.5 kg (153 lb 3.2 oz)   Height: 165.1 cm (65\")     Body mass index is 25.49 kg/m².      Physical Exam  Neurologic Exam  Awake, alert, oriented x3  Pupils equal round reactive to light  Extraocular muscles intact  Face symmetric  Speech is fluent and clear  No pronator drift  Motor exam  Bilateral deltoids 5/5, bilateral biceps 5/5, bilateral triceps 5/5, bilateral wrist extension 5/5 bilateral hand  5/5  Bilateral hip flexion 5/5, bilateral knee extension 5/5, bilateral DF/PF 5/5  No clonus  No Raj's reflex  Steady normal gait  Able to detect  light touch in all 4 extremities          Assessment & Plan   Independent Review of Radiographic Studies:      I personally reviewed the images from the following studies.  CT head without contrast from 2024  The CT images were reviewed and demonstrate no evidence of hydrocephalus.  No residual subarachnoid hemorrhage.    Medical Decision Makin-year-old female s/p cerebral angiogram with embolization of a ruptured anterior " communicating artery aneurysm on January 16, 2024   -She has made a great recovery since her hemorrhage.  I will plan to have her follow-up in 3 to 4 weeks with a repeat diagnostic angiogram to evaluate for any residual or recurrent aneurysm.        Diagnoses and all orders for this visit:    1. Brain aneurysm (Primary)  -     IR Arch & 3 Vessel Head; Future  -     Obtain Informed Consent; Standing  -     Insert Peripheral IV; Standing  -     Saline Lock & Maintain IV Access; Standing  -     sodium chloride 0.9 % flush 10 mL  -     sodium chloride 0.9 % flush 10 mL  -     sodium chloride 0.9 % infusion 40 mL  -     Basic Metabolic Panel; Standing  -     CBC & Differential; Standing    2. History of subarachnoid hemorrhage  -     IR Arch & 3 Vessel Head; Future  -     Obtain Informed Consent; Standing  -     Insert Peripheral IV; Standing  -     Saline Lock & Maintain IV Access; Standing  -     sodium chloride 0.9 % flush 10 mL  -     sodium chloride 0.9 % flush 10 mL  -     sodium chloride 0.9 % infusion 40 mL  -     Basic Metabolic Panel; Standing  -     CBC & Differential; Standing      Return For a diagnostic angiogram.  I spent 30 minutes reviewing the medical record, reviewing the CT images, discussing the management of intracranial aneurysms, discussing the importance of avoiding nicotine and tobacco products.

## 2024-02-21 NOTE — OUTREACH NOTE
Stroke Week 3 Survey      Flowsheet Row Responses   [facility] facility patient discharged from? [place]   Does the patient have one of the following disease processes/diagnoses(primary or secondary)? Stroke   Week 3 attempt successful? No   Unsuccessful attempts Attempt 1   Revoke Modesto FRANCO - Registered Nurse

## 2024-02-27 ENCOUNTER — OFFICE VISIT (OUTPATIENT)
Dept: NEUROSURGERY | Facility: CLINIC | Age: 59
End: 2024-02-27
Payer: COMMERCIAL

## 2024-02-27 VITALS
TEMPERATURE: 98 F | HEART RATE: 91 BPM | SYSTOLIC BLOOD PRESSURE: 122 MMHG | WEIGHT: 153.2 LBS | HEIGHT: 65 IN | OXYGEN SATURATION: 99 % | BODY MASS INDEX: 25.52 KG/M2 | DIASTOLIC BLOOD PRESSURE: 80 MMHG

## 2024-02-27 DIAGNOSIS — Z86.79 HISTORY OF SUBARACHNOID HEMORRHAGE: ICD-10-CM

## 2024-02-27 DIAGNOSIS — I67.1 BRAIN ANEURYSM: Primary | ICD-10-CM

## 2024-02-27 PROCEDURE — 99214 OFFICE O/P EST MOD 30 MIN: CPT | Performed by: NEUROLOGICAL SURGERY

## 2024-02-27 RX ORDER — SODIUM CHLORIDE 0.9 % (FLUSH) 0.9 %
10 SYRINGE (ML) INJECTION EVERY 12 HOURS SCHEDULED
OUTPATIENT
Start: 2024-02-27

## 2024-02-27 RX ORDER — AMLODIPINE BESYLATE 5 MG/1
5 TABLET ORAL DAILY
COMMUNITY
Start: 2024-02-16 | End: 2025-02-15

## 2024-02-27 RX ORDER — LISINOPRIL AND HYDROCHLOROTHIAZIDE 25; 20 MG/1; MG/1
1 TABLET ORAL DAILY
COMMUNITY
Start: 2024-02-16 | End: 2024-05-16

## 2024-02-27 RX ORDER — SODIUM CHLORIDE 9 MG/ML
40 INJECTION, SOLUTION INTRAVENOUS AS NEEDED
OUTPATIENT
Start: 2024-02-27

## 2024-02-27 RX ORDER — SODIUM CHLORIDE 0.9 % (FLUSH) 0.9 %
10 SYRINGE (ML) INJECTION AS NEEDED
OUTPATIENT
Start: 2024-02-27

## 2024-03-11 ENCOUNTER — TELEPHONE (OUTPATIENT)
Dept: NEUROSURGERY | Facility: CLINIC | Age: 59
End: 2024-03-11
Payer: COMMERCIAL

## 2024-03-11 NOTE — TELEPHONE ENCOUNTER
Hub staff attempted to follow warm transfer process and was unsuccessful     Caller: PATIENT    Relationship to patient: SELF    Best call back number: [phone]    Patient is needing: PT CALLED AND STATES THAT SHE MISSED A CALL FROM LUANNE TO GO OVER HER UPCOMING SURGERY-TRIED TO WARM TRANSFER BUT WAS NOT ABLE TO SPEAK TO ANYONE-SENDING TO OFFICE TO ADVISE THANK YOU

## 2024-03-15 ENCOUNTER — LAB (OUTPATIENT)
Dept: LAB | Facility: HOSPITAL | Age: 59
End: 2024-03-15
Payer: COMMERCIAL

## 2024-03-15 DIAGNOSIS — I67.1 BRAIN ANEURYSM: ICD-10-CM

## 2024-03-15 DIAGNOSIS — I67.1 BRAIN ANEURYSM: Primary | ICD-10-CM

## 2024-03-15 LAB
ANION GAP SERPL CALCULATED.3IONS-SCNC: 10 MMOL/L (ref 5–15)
BASOPHILS # BLD AUTO: 0.03 10*3/MM3 (ref 0–0.2)
BASOPHILS NFR BLD AUTO: 0.5 % (ref 0–1.5)
BUN SERPL-MCNC: 18 MG/DL (ref 6–20)
BUN/CREAT SERPL: 25.7 (ref 7–25)
CALCIUM SPEC-SCNC: 9.7 MG/DL (ref 8.6–10.5)
CHLORIDE SERPL-SCNC: 104 MMOL/L (ref 98–107)
CO2 SERPL-SCNC: 27 MMOL/L (ref 22–29)
CREAT SERPL-MCNC: 0.7 MG/DL (ref 0.57–1)
DEPRECATED RDW RBC AUTO: 41.1 FL (ref 37–54)
EGFRCR SERPLBLD CKD-EPI 2021: 99.8 ML/MIN/1.73
EOSINOPHIL # BLD AUTO: 0.18 10*3/MM3 (ref 0–0.4)
EOSINOPHIL NFR BLD AUTO: 2.9 % (ref 0.3–6.2)
ERYTHROCYTE [DISTWIDTH] IN BLOOD BY AUTOMATED COUNT: 13.1 % (ref 12.3–15.4)
GLUCOSE SERPL-MCNC: 116 MG/DL (ref 65–99)
HCT VFR BLD AUTO: 41.2 % (ref 34–46.6)
HGB BLD-MCNC: 13.3 G/DL (ref 12–15.9)
IMM GRANULOCYTES # BLD AUTO: 0.01 10*3/MM3 (ref 0–0.05)
IMM GRANULOCYTES NFR BLD AUTO: 0.2 % (ref 0–0.5)
LYMPHOCYTES # BLD AUTO: 2.74 10*3/MM3 (ref 0.7–3.1)
LYMPHOCYTES NFR BLD AUTO: 44.1 % (ref 19.6–45.3)
MCH RBC QN AUTO: 27.8 PG (ref 26.6–33)
MCHC RBC AUTO-ENTMCNC: 32.3 G/DL (ref 31.5–35.7)
MCV RBC AUTO: 86 FL (ref 79–97)
MONOCYTES # BLD AUTO: 0.51 10*3/MM3 (ref 0.1–0.9)
MONOCYTES NFR BLD AUTO: 8.2 % (ref 5–12)
NEUTROPHILS NFR BLD AUTO: 2.75 10*3/MM3 (ref 1.7–7)
NEUTROPHILS NFR BLD AUTO: 44.1 % (ref 42.7–76)
NRBC BLD AUTO-RTO: 0 /100 WBC (ref 0–0.2)
PLATELET # BLD AUTO: 353 10*3/MM3 (ref 140–450)
PMV BLD AUTO: 9.3 FL (ref 6–12)
POTASSIUM SERPL-SCNC: 3.8 MMOL/L (ref 3.5–5.2)
RBC # BLD AUTO: 4.79 10*6/MM3 (ref 3.77–5.28)
SODIUM SERPL-SCNC: 141 MMOL/L (ref 136–145)
WBC NRBC COR # BLD AUTO: 6.22 10*3/MM3 (ref 3.4–10.8)

## 2024-03-15 PROCEDURE — 85025 COMPLETE CBC W/AUTO DIFF WBC: CPT

## 2024-03-15 PROCEDURE — 36415 COLL VENOUS BLD VENIPUNCTURE: CPT

## 2024-03-15 PROCEDURE — 80048 BASIC METABOLIC PNL TOTAL CA: CPT

## 2024-03-21 ENCOUNTER — HOSPITAL ENCOUNTER (OUTPATIENT)
Dept: INTERVENTIONAL RADIOLOGY/VASCULAR | Facility: HOSPITAL | Age: 59
Discharge: HOME | End: 2024-03-21
Payer: COMMERCIAL

## 2024-03-21 VITALS
BODY MASS INDEX: 25.49 KG/M2 | OXYGEN SATURATION: 97 % | RESPIRATION RATE: 12 BRPM | WEIGHT: 153 LBS | SYSTOLIC BLOOD PRESSURE: 108 MMHG | DIASTOLIC BLOOD PRESSURE: 67 MMHG | HEIGHT: 65 IN | HEART RATE: 93 BPM | TEMPERATURE: 98 F

## 2024-03-21 DIAGNOSIS — Z86.79 HISTORY OF SUBARACHNOID HEMORRHAGE: ICD-10-CM

## 2024-03-21 DIAGNOSIS — I67.1 BRAIN ANEURYSM: ICD-10-CM

## 2024-03-21 LAB
ANION GAP SERPL CALCULATED.3IONS-SCNC: 12.3 MMOL/L (ref 5–15)
BASOPHILS # BLD AUTO: 0.04 10*3/MM3 (ref 0–0.2)
BASOPHILS NFR BLD AUTO: 0.6 % (ref 0–1.5)
BUN SERPL-MCNC: 16 MG/DL (ref 6–20)
BUN/CREAT SERPL: 25.8 (ref 7–25)
CALCIUM SPEC-SCNC: 9.5 MG/DL (ref 8.6–10.5)
CHLORIDE SERPL-SCNC: 109 MMOL/L (ref 98–107)
CO2 SERPL-SCNC: 19.7 MMOL/L (ref 22–29)
CREAT SERPL-MCNC: 0.62 MG/DL (ref 0.57–1)
DEPRECATED RDW RBC AUTO: 41 FL (ref 37–54)
EGFRCR SERPLBLD CKD-EPI 2021: 102.7 ML/MIN/1.73
EOSINOPHIL # BLD AUTO: 0.15 10*3/MM3 (ref 0–0.4)
EOSINOPHIL NFR BLD AUTO: 2.3 % (ref 0.3–6.2)
ERYTHROCYTE [DISTWIDTH] IN BLOOD BY AUTOMATED COUNT: 13 % (ref 12.3–15.4)
GLUCOSE SERPL-MCNC: 95 MG/DL (ref 65–99)
HCT VFR BLD AUTO: 38.3 % (ref 34–46.6)
HGB BLD-MCNC: 12.3 G/DL (ref 12–15.9)
IMM GRANULOCYTES # BLD AUTO: 0.02 10*3/MM3 (ref 0–0.05)
IMM GRANULOCYTES NFR BLD AUTO: 0.3 % (ref 0–0.5)
LYMPHOCYTES # BLD AUTO: 2.62 10*3/MM3 (ref 0.7–3.1)
LYMPHOCYTES NFR BLD AUTO: 39.9 % (ref 19.6–45.3)
MCH RBC QN AUTO: 27.7 PG (ref 26.6–33)
MCHC RBC AUTO-ENTMCNC: 32.1 G/DL (ref 31.5–35.7)
MCV RBC AUTO: 86.3 FL (ref 79–97)
MONOCYTES # BLD AUTO: 0.53 10*3/MM3 (ref 0.1–0.9)
MONOCYTES NFR BLD AUTO: 8.1 % (ref 5–12)
NEUTROPHILS NFR BLD AUTO: 3.21 10*3/MM3 (ref 1.7–7)
NEUTROPHILS NFR BLD AUTO: 48.8 % (ref 42.7–76)
NRBC BLD AUTO-RTO: 0 /100 WBC (ref 0–0.2)
PLATELET # BLD AUTO: 307 10*3/MM3 (ref 140–450)
PMV BLD AUTO: 9.2 FL (ref 6–12)
POTASSIUM SERPL-SCNC: 3.8 MMOL/L (ref 3.5–5.2)
RBC # BLD AUTO: 4.44 10*6/MM3 (ref 3.77–5.28)
SODIUM SERPL-SCNC: 141 MMOL/L (ref 136–145)
WBC NRBC COR # BLD AUTO: 6.57 10*3/MM3 (ref 3.4–10.8)

## 2024-03-21 PROCEDURE — 36224 PLACE CATH CAROTD ART: CPT | Performed by: NEUROLOGICAL SURGERY

## 2024-03-21 PROCEDURE — 76376 3D RENDER W/INTRP POSTPROCES: CPT

## 2024-03-21 PROCEDURE — 25010000002 FENTANYL CITRATE (PF) 50 MCG/ML SOLUTION: Performed by: NEUROLOGICAL SURGERY

## 2024-03-21 PROCEDURE — 25010000002 HEPARIN (PORCINE) PER 1000 UNITS: Performed by: NEUROLOGICAL SURGERY

## 2024-03-21 PROCEDURE — 76377 3D RENDER W/INTRP POSTPROCES: CPT | Performed by: NEUROLOGICAL SURGERY

## 2024-03-21 PROCEDURE — 85025 COMPLETE CBC W/AUTO DIFF WBC: CPT | Performed by: NEUROLOGICAL SURGERY

## 2024-03-21 PROCEDURE — 99152 MOD SED SAME PHYS/QHP 5/>YRS: CPT

## 2024-03-21 PROCEDURE — 80048 BASIC METABOLIC PNL TOTAL CA: CPT | Performed by: NEUROLOGICAL SURGERY

## 2024-03-21 PROCEDURE — C1769 GUIDE WIRE: HCPCS | Performed by: NEUROLOGICAL SURGERY

## 2024-03-21 PROCEDURE — C1894 INTRO/SHEATH, NON-LASER: HCPCS | Performed by: NEUROLOGICAL SURGERY

## 2024-03-21 PROCEDURE — 0 IODIXANOL PER 1 ML: Performed by: NEUROLOGICAL SURGERY

## 2024-03-21 PROCEDURE — C1760 CLOSURE DEV, VASC: HCPCS | Performed by: NEUROLOGICAL SURGERY

## 2024-03-21 PROCEDURE — 25010000002 MIDAZOLAM PER 1 MG: Performed by: NEUROLOGICAL SURGERY

## 2024-03-21 PROCEDURE — 76377 3D RENDER W/INTRP POSTPROCES: CPT

## 2024-03-21 PROCEDURE — 25010000002 CEFAZOLIN PER 500 MG: Performed by: NEUROLOGICAL SURGERY

## 2024-03-21 PROCEDURE — 99153 MOD SED SAME PHYS/QHP EA: CPT

## 2024-03-21 RX ORDER — IODIXANOL 320 MG/ML
200 INJECTION, SOLUTION INTRAVASCULAR
Status: COMPLETED | OUTPATIENT
Start: 2024-03-21 | End: 2024-03-21

## 2024-03-21 RX ORDER — HEPARIN SODIUM 1000 [USP'U]/ML
INJECTION, SOLUTION INTRAVENOUS; SUBCUTANEOUS AS NEEDED
Status: COMPLETED | OUTPATIENT
Start: 2024-03-21 | End: 2024-03-21

## 2024-03-21 RX ORDER — MIDAZOLAM HYDROCHLORIDE 1 MG/ML
INJECTION INTRAMUSCULAR; INTRAVENOUS AS NEEDED
Status: COMPLETED | OUTPATIENT
Start: 2024-03-21 | End: 2024-03-21

## 2024-03-21 RX ORDER — SODIUM CHLORIDE 0.9 % (FLUSH) 0.9 %
10 SYRINGE (ML) INJECTION EVERY 12 HOURS SCHEDULED
Status: DISCONTINUED | OUTPATIENT
Start: 2024-03-21 | End: 2024-03-22 | Stop reason: HOSPADM

## 2024-03-21 RX ORDER — FENTANYL CITRATE 50 UG/ML
INJECTION, SOLUTION INTRAMUSCULAR; INTRAVENOUS AS NEEDED
Status: COMPLETED | OUTPATIENT
Start: 2024-03-21 | End: 2024-03-21

## 2024-03-21 RX ORDER — SODIUM CHLORIDE 9 MG/ML
40 INJECTION, SOLUTION INTRAVENOUS AS NEEDED
Status: DISCONTINUED | OUTPATIENT
Start: 2024-03-21 | End: 2024-03-22 | Stop reason: HOSPADM

## 2024-03-21 RX ORDER — SODIUM CHLORIDE 0.9 % (FLUSH) 0.9 %
10 SYRINGE (ML) INJECTION AS NEEDED
Status: DISCONTINUED | OUTPATIENT
Start: 2024-03-21 | End: 2024-03-22 | Stop reason: HOSPADM

## 2024-03-21 RX ADMIN — IODIXANOL 51 ML: 320 INJECTION, SOLUTION INTRAVASCULAR at 09:13

## 2024-03-21 RX ADMIN — FENTANYL CITRATE 50 MCG: 50 INJECTION INTRAMUSCULAR; INTRAVENOUS at 08:52

## 2024-03-21 RX ADMIN — FENTANYL CITRATE 50 MCG: 50 INJECTION INTRAMUSCULAR; INTRAVENOUS at 08:50

## 2024-03-21 RX ADMIN — SODIUM CHLORIDE 2000 MG: 900 INJECTION INTRAVENOUS at 08:28

## 2024-03-21 RX ADMIN — MIDAZOLAM 0.5 MG: 1 INJECTION INTRAMUSCULAR; INTRAVENOUS at 08:50

## 2024-03-21 RX ADMIN — HEPARIN SODIUM 3000 UNITS: 1000 INJECTION, SOLUTION INTRAVENOUS; SUBCUTANEOUS at 08:53

## 2024-03-21 NOTE — OP NOTE
Procedure:   Diagnostic cerebral angiogram  3D angiogram using a power injector     Surgeon: Qamar Duncan MD     Anesthesia Type         Light sedation with 0.5 mg Versed, 100 mcg fentanyl, 10 mL lidocaine     Pre-operative Diagnosis: History of embolized ruptured anterior communicating artery aneurysm on January 16, 2024    Post-operative Diagnosis:  No residual or recurrent aneurysm       Name of Procedure: Diagnostic cerebral angiogram.     Vessels catheterized:  1.  Right femoral artery  2.  Left internal carotid artery       Indications for procedure and informed consent:  The patient is a 58-year-old female previously seen in the hospital after embolization of a ruptured anterior communicating artery aneurysm.  She has made a great recovery from her hemorrhage.  She presents today for follow-up diagnostic angiogram to confirm complete embolization of the aneurysm and evaluate for recurrence or residual aneurysm.  The risks and benefits of the procedure were discussed with the patient including, but not limited to the risk of infection, hemorrhage, vessel injury, stroke.  She expressed understanding of the risks and benefits and elected to proceed with the diagnostic study.         Description of the procedure: The patient was brought to the angio suite and placed in a supine position on the exam table.    The patient's groin was shaved, prepped, and draped in the usual sterile fashion.  A timeout was performed.  Access to the right femoral artery was obtained using a micropuncture needle.  There was no resistance to threading the microwire. Using modified Seldinger technique, a 5 Omani introducer sheath was inserted into the right femoral artery.  A femoral artery angiogram was performed which showed the sheath in good position with access over the femoral head and no extravasation of contrast.  Then a 5 Omani diagnostic catheter was introduced into the system over 35 Glidewire with the wire ahead of the  catheter and the system was navigated through the normal vascular channels into the left internal carotid artery.   AP, lateral, oblique and magnified angiogram images were obtained and demonstrate no evidence of residual or recurrent aneurysm.  A 3D angiogram was also performed using the power injector with the catheter positioned in the left internal carotid artery.  After completing the study all the images were reviewed carefully there was no evidence of AVM, aneurysm, or dural AV fistula.  The catheter was then removed and the groin was closed with a 5 Yoruba minx closure device.  Her groin was flat and dry and she was transferred to the recovery unit in stable condition.          Interpretation of the films:  1. Left common carotid artery: AP, lateral, and obliques. Cervical views. Left common carotid artery bifurcates into the internal and external carotid arteries with normal branches of the external carotid artery. Mild tortuosity is noted. There is mild  atherosclerotic changes with mild stenosis of the external and internal carotid arteries distal to the bifurcation.   2. Left internal carotid artery. Intracranial views: AP, lateral, and obliques. The internal carotid artery injection shows the course of the ICA at the skull base and there is mild tortuosity with no significant stenosis. The cavernous segment is unremarkable.  The internal carotid artery terminates bifurcating into the middle cerebral artery and anterior cerebral artery.   The anterior communicating artery segment is patent with filling of the contralateral JUAN branches.  There is metallic artifact from the intra saccular device.  There is no evidence of residual or recurrent aneurysm.  The anatomy can best be seen on the 3D angiogram images  3.  Right femoral artery: AP view.  The puncture site is seen over top the femoral head and below the bifurcation.  There is no evidence of extravasation.  No evidence of vessel  dissection.      Estimated blood loss: Less than 10 mL  Complications: None  Specimen: None  Disposition: Discharge home if no evidence of groin hematoma after 2 hours and back to baseline

## 2024-04-04 NOTE — PROGRESS NOTES
Subjective   History of Present Illness: Binta Morales is a 58 y.o. female is here today for follow-up after a cerebral angiogram on 3/21/24.  She is doing well today.  No new complaints.  Denies any changes in the frequency or severity of her headaches.  Denies any changes in vision.  Denies any strokelike episodes.  Denies any changes in strength or sensation.      History of Present Illness    The following portions of the patient's history were reviewed and updated as appropriate: allergies, current medications, past family history, past medical history, past social history, past surgical history, and problem list.    Past Medical History:   Diagnosis Date    Cerebral aneurysm     Hypertension     Ovarian cyst         Past Surgical History:   Procedure Laterality Date    EMBOLIZATION CEREBRAL N/A 1/16/2024    Procedure: CEREBRAL ANGIOGRAM WITH EMBOLIZATION;  Surgeon: Angelito Gamble MD;  Location: [facility] 18/19;  Service: Interventional Radiology;  Laterality: N/A;          Current Outpatient Medications:     amLODIPine (NORVASC) 5 MG tablet, Take 1 tablet by mouth Daily., Disp: , Rfl:     cetirizine (zyrTEC) 5 MG tablet, Take 1 tablet by mouth Daily., Disp: , Rfl:     Cholecalciferol 25 MCG (1000 UT) tablet, Take 1 tablet by mouth Daily., Disp: , Rfl:     lisinopril-hydrochlorothiazide (PRINZIDE,ZESTORETIC) 20-25 MG per tablet, Take 1 tablet by mouth Daily., Disp: , Rfl:     vitamin B-12 (CYANOCOBALAMIN) 100 MCG tablet, Take 0.5 tablets by mouth Daily., Disp: , Rfl:     midodrine (PROAMATINE) 5 MG tablet, Take 1 tablet by mouth Every 8 (Eight) Hours for 30 days., Disp: 90 tablet, Rfl: 0    niMODipine (NIMOTOP) 30 MG capsule, Take 2 capsules by mouth Every 4 (Four) Hours for 12 days., Disp: 144 capsule, Rfl: 0     Allergies   Allergen Reactions    Augmentin [Amoxicillin-Pot Clavulanate] Rash     Developed rash on back after augmentin exposure. Med was held.    Latex Rash    Sulfa Antibiotics Rash     "    Social History     Socioeconomic History    Marital status:    Tobacco Use    Smoking status: Never    Smokeless tobacco: Never   Vaping Use    Vaping status: Never Used   Substance and Sexual Activity    Alcohol use: Yes     Comment: rare, social    Drug use: Never        Family History   Problem Relation Age of Onset    Cerebral aneurysm Neg Hx         Review of Systems   Eyes:  Negative for visual disturbance.   Musculoskeletal:  Positive for neck stiffness.   Neurological:  Negative for dizziness, light-headedness and headaches.       Objective     Vitals:    04/10/24 1314   BP: 115/73   Cuff Size: Adult   Pulse: 80   Temp: 97.1 °F (36.2 °C)   SpO2: 98%   Weight: 69.4 kg (153 lb)   Height: 165.1 cm (65\")     Body mass index is 25.46 kg/m².      Physical Exam  Neurologic Exam  Awake, alert, oriented x3  Pupils equal round reactive to light  Extraocular muscles intact  Face symmetric  Speech is fluent and clear  No pronator drift  Motor exam  Bilateral deltoids 5/5, bilateral biceps 5/5, bilateral triceps 5/5, bilateral wrist extension 5/5 bilateral hand  5/5  Bilateral hip flexion 5/5, bilateral knee extension 5/5, bilateral DF/PF 5/5  No clonus  No Raj's reflex  Able to detect  light touch in all 4 extremities          Assessment & Plan     Medical Decision Makin-year-old female s/p diagnostic cerebral angiogram on 3/21/2024  -She was previously seen in the hospital on 2024.  She was admitted with a subarachnoid hemorrhage and underwent coiling of a anterior communicating artery aneurysm.  She has made a great recovery from the subarachnoid hemorrhage and from her procedure.  The diagnostic angiogram shows no residual or recurrent aneurysm.  -I will plan to have her follow-up in 1 year with a repeat CTA      Diagnoses and all orders for this visit:    1. Brain aneurysm (Primary)  -     CT Angiogram Head With Contrast; Future  -     CT Angiogram Carotids; Future    2. " History of subarachnoid hemorrhage  -     CT Angiogram Head With Contrast; Future  -     CT Angiogram Carotids; Future      Return in about 1 year (around 4/10/2025).

## 2024-04-10 ENCOUNTER — OFFICE VISIT (OUTPATIENT)
Dept: NEUROSURGERY | Facility: CLINIC | Age: 59
End: 2024-04-10
Payer: COMMERCIAL

## 2024-04-10 VITALS
DIASTOLIC BLOOD PRESSURE: 73 MMHG | BODY MASS INDEX: 25.49 KG/M2 | HEART RATE: 80 BPM | OXYGEN SATURATION: 98 % | TEMPERATURE: 97.1 F | HEIGHT: 65 IN | SYSTOLIC BLOOD PRESSURE: 115 MMHG | WEIGHT: 153 LBS

## 2024-04-10 DIAGNOSIS — I67.1 BRAIN ANEURYSM: Primary | ICD-10-CM

## 2024-04-10 DIAGNOSIS — Z86.79 HISTORY OF SUBARACHNOID HEMORRHAGE: ICD-10-CM

## 2024-04-10 PROCEDURE — 99024 POSTOP FOLLOW-UP VISIT: CPT | Performed by: NEUROLOGICAL SURGERY

## 2024-05-06 ENCOUNTER — TELEPHONE (OUTPATIENT)
Dept: NEUROSURGERY | Facility: CLINIC | Age: 59
End: 2024-05-06
Payer: COMMERCIAL

## 2024-05-06 NOTE — TELEPHONE ENCOUNTER
----- Message from Josephine ROGERS sent at 5/3/2024 12:31 PM EDT -----  Regarding: Soreness in my groin area   Contact: [phone]  Hey! I’ve been having soreness in my groin area from the sites of the surgery & procedure. The soreness goes into my thighs. I’m still having aches and twinges in the back of my neck. Today I have some swelling in my ankles and feet- particularly on the right side.   I have been very physically active. I’ve been walking about 16,000 steps a day. Are the aches & pains normal and to be expected, or should I set an appointment to see you?

## 2024-05-06 NOTE — TELEPHONE ENCOUNTER
I called patient to get more info about swelling in ankles and feet? Needing to know if pain and swelling is unchanged since last week?    none

## 2025-03-25 ENCOUNTER — TELEPHONE (OUTPATIENT)
Dept: NEUROSURGERY | Facility: CLINIC | Age: 60
End: 2025-03-25
Payer: COMMERCIAL

## 2025-03-25 NOTE — TELEPHONE ENCOUNTER
Called patient phone went to voicemail. Left vm. Stephanie LAWSON would like her to have MRI where her insurance covers.     (Ok for Hub to read this message to patient)

## 2025-03-25 NOTE — TELEPHONE ENCOUNTER
Patient called back asking for paul, Patient calling to let us know she spoke with her insurance company and due to the patient wanting it done at [facility] the patient insurance would like to do a peer to peer. Patient  stated to please call her at [phone]

## 2025-03-25 NOTE — TELEPHONE ENCOUNTER
I called patient left . It may be her insurance that may require her to have her imaging outside of [facility] because it is cheaper. ([place] is one they use most of the time). She can refuse and pay out of pocket if insurance does not want to cover.     (Ok for Hub to read this message to patient)

## 2025-03-25 NOTE — TELEPHONE ENCOUNTER
PATIENT CALLED IN AND STATES HER INSURANCE WANTS A STATEMENT STATING HER IMAGING HAS TO BE DONE IN A HOSPITAL SETTING     THEY ARE TRYING TO GET HER TO GO ELSEWHERE FOR IMAGING     PATIENT IS CONFUSED AND NEEDS CLARIFICATION     PLEASE CALL PATIENT WITH ANY ADVICE     [phone]    THANK YOU!

## 2025-03-26 ENCOUNTER — TELEPHONE (OUTPATIENT)
Dept: NEUROSURGERY | Facility: CLINIC | Age: 60
End: 2025-03-26
Payer: COMMERCIAL

## 2025-03-26 NOTE — TELEPHONE ENCOUNTER
Provider: DR GUTIERREZ    Caller: TRAV MOHAMUD    Relationship to Patient: SELF      Phone Number: [phone]    Reason for Call: I READ THE COMMS FROM PREVIOUS ENCOUNTER.  PATIENT DOESN'T FEEL COMFORTABLE HAVING THIS IMAGING DONE AT [facility], SINCE IT IS REGARDING HER CAROTID ARTERY.  SHE WOULD RATHER THIS BE DONE IN A HOSPITAL SETTING.      SHE SPOKE TO HER INSURANCE COMPANY.  THEY WOULD NEED A NOTE FROM DR GUTIERREZ OR RENNY, CASE NUMBER, [id number].  THIS NOTE CAN BE [phone].  COVER PAGE WITH CASE NUMBER AND RECONSIDERATION (INCLUDED ON COVER PAGE).      PLEASE ADVISE.    When was the patient last seen: 4-10-24

## 2025-03-26 NOTE — TELEPHONE ENCOUNTER
Caller: Binta Morales    Relationship: Self    Best call back number: [phone]    What is the best time to reach you: ANYTIME    Who are you requesting to speak with (clinical staff, provider,  specific staff member):CLINICAL      Do you know the name of the person who called: NA    What was the call regarding: PATIENT CALLED BACK TO CHECK ON LETTER-ADVISED MESSAGE HAS BEEN SENT-THANK YOU    Is it okay if the provider responds through [facility]:

## 2025-03-26 NOTE — TELEPHONE ENCOUNTER
I spoke to patient and . They will contact their insurance to see what is covered under her insurance. They were confused on CTA head/neck imaging verses an diagnostic angiogram. I explained to them the difference. They will call our office back to let us know where they will be having th scan done so we can have the imaging/report at time of follow up appointment.

## 2025-03-27 ENCOUNTER — TELEPHONE (OUTPATIENT)
Dept: NEUROSURGERY | Facility: CLINIC | Age: 60
End: 2025-03-27
Payer: COMMERCIAL

## 2025-03-27 NOTE — TELEPHONE ENCOUNTER
Rip please call Rowena RE: cancelling of patient's CTA tomorrow due to insurance denying [facility] for the location of the CTA; patient's insurance wants her to go to a free standing facitty

## 2025-04-17 ENCOUNTER — TELEPHONE (OUTPATIENT)
Dept: NEUROSURGERY | Facility: CLINIC | Age: 60
End: 2025-04-17
Payer: COMMERCIAL

## 2025-04-17 DIAGNOSIS — I67.1 BRAIN ANEURYSM: Primary | ICD-10-CM

## 2025-04-17 NOTE — TELEPHONE ENCOUNTER
Patient unable to have CTAs with contrast. They tried and her veins are too little. Ok per Dr. Duncan to do MRA's instead new orders placed.

## 2025-04-18 ENCOUNTER — TELEPHONE (OUTPATIENT)
Dept: NEUROSURGERY | Facility: CLINIC | Age: 60
End: 2025-04-18
Payer: COMMERCIAL

## 2025-04-18 NOTE — TELEPHONE ENCOUNTER
Patient called in asking about MRA's . She requested it to be sent to [facility] in which I forward orders to [place].

## 2025-04-23 ENCOUNTER — TELEPHONE (OUTPATIENT)
Dept: NEUROSURGERY | Facility: CLINIC | Age: 60
End: 2025-04-23
Payer: COMMERCIAL

## 2025-04-23 NOTE — TELEPHONE ENCOUNTER
Spoke to patient's  I sent new orders to [facility] for MRA on 4/18 Spoke to [facility] today and had orders resent to them. They will call for appt today. If patient does not hear from [facility] today by 3p they are to call the office so I can conference and schedule her today.

## 2025-04-29 NOTE — PROGRESS NOTES
Subjective   History of Present Illness: Binta Morales is a 59 y.o. female is here today for follow-up. MRA head/neck- [facility] 5/1/25.  She is doing well today.  No new complaints.  Denies any changes in the frequency or severity of her headaches.  Denies any changes in vision.  Denies any strokelike episodes.  Denies any changes in strength or sensation.      History of Present Illness    The following portions of the patient's history were reviewed and updated as appropriate: allergies, current medications, past family history, past medical history, past social history, past surgical history, and problem list.    Past Medical History:   Diagnosis Date    Cerebral aneurysm     Hypertension     Ovarian cyst         Past Surgical History:   Procedure Laterality Date    EMBOLIZATION CEREBRAL N/A 1/16/2024    Procedure: CEREBRAL ANGIOGRAM WITH EMBOLIZATION;  Surgeon: Angelito Gamble MD;  Location: [facility] 18/19;  Service: Interventional Radiology;  Laterality: N/A;          Current Outpatient Medications:     amLODIPine (NORVASC) 5 MG tablet, TAKE 1 TABLET DAILY (DUE FOR LABS AND ANNUAL FEBRUARY 2025), Disp: , Rfl:     cetirizine (zyrTEC) 5 MG tablet, Take 1 tablet by mouth Daily., Disp: , Rfl:     Cholecalciferol 25 MCG (1000 UT) tablet, Take 1 tablet by mouth Daily., Disp: , Rfl:     lisinopril-hydrochlorothiazide (PRINZIDE,ZESTORETIC) 20-25 MG per tablet, Take 1 tablet by mouth Daily., Disp: , Rfl:     vitamin B-12 (CYANOCOBALAMIN) 100 MCG tablet, Take 0.5 tablets by mouth Daily., Disp: , Rfl:     midodrine (PROAMATINE) 5 MG tablet, Take 1 tablet by mouth Every 8 (Eight) Hours for 30 days., Disp: 90 tablet, Rfl: 0    niMODipine (NIMOTOP) 30 MG capsule, Take 2 capsules by mouth Every 4 (Four) Hours for 12 days., Disp: 144 capsule, Rfl: 0     Allergies   Allergen Reactions    Augmentin [Amoxicillin-Pot Clavulanate] Rash     Developed rash on back after augmentin exposure. Med was held.    Latex  "Rash    Sulfa Antibiotics Rash        Social History     Socioeconomic History    Marital status:    Tobacco Use    Smoking status: Never    Smokeless tobacco: Never   Vaping Use    Vaping status: Never Used   Substance and Sexual Activity    Alcohol use: Yes     Comment: rare, social    Drug use: Never        Family History   Problem Relation Age of Onset    Cerebral aneurysm Neg Hx         Review of Systems   Eyes:  Negative for visual disturbance.   Cardiovascular:  Positive for leg swelling (feet).   Neurological:  Positive for tremors (R>L). Negative for dizziness, speech difficulty, light-headedness, numbness and headaches.   All other systems reviewed and are negative.      Objective     Vitals:    25 1431   BP: 110/68   Pulse: 107   Temp: 97.7 °F (36.5 °C)   SpO2: 97%   Weight: 60.9 kg (134 lb 4.8 oz)   Height: 165.1 cm (65\")     Body mass index is 22.35 kg/m².      Physical Exam  Awake, alert, oriented x3  Pupils equal round reactive to light  Extraocular muscles intact  Face symmetric  Speech is fluent and clear  No pronator drift  Motor exam  Bilateral deltoids 5/5, bilateral biceps 5/5, bilateral triceps 5/5, bilateral wrist extension 5/5 bilateral hand  5/5  Bilateral hip flexion 5/5, bilateral knee extension 5/5, bilateral DF/PF 5/5  Steady normal gait  Able to detect  light touch in all 4 extremities          Assessment & Plan   Independent Review of Radiographic Studies:      I personally reviewed the images from the following studies.  MRA head neck from 2025  The MRA images were reviewed and demonstrate coil artifact from the previously coiled anterior communicating artery aneurysm.  No evidence of recurrent or residual aneurysm.    Medical Decision Makin-year-old female s/p diagnostic cerebral angiogram on 2024  -She was previously seen in the hospital on 2024.  She was admitted after subarachnoid hemorrhage and underwent coiling of an " anterior communicating artery aneurysm.  -Follow-up MRA from April 17, 2025 shows no evidence of recurrent or residual aneurysm.  I will plan to see her back in 1 year with repeat imaging  -She reports that she has developed some tremor over the last several months.  She is requesting referral to neurology for further evaluation and treatment of her tremor.  I do not think this is directly related to the subarachnoid hemorrhage or aneurysm.  She also indicates that she has had some unexpected weight loss and hair loss.  I have asked her to follow-up with her primary care provider for evaluation of her thyroid function  Diagnoses and all orders for this visit:    1. Brain aneurysm (Primary)  -     MRI Angiogram Head Without Contrast; Future  -     MRI Angiogram Neck Without Contrast; Future    2. History of subarachnoid hemorrhage  -     MRI Angiogram Head Without Contrast; Future  -     MRI Angiogram Neck Without Contrast; Future    3. Tremor  -     Ambulatory Referral to Neurology      Return in about 1 year (around 5/5/2026).  I spent 45 minutes reviewing the medical record, reviewing the CTA images, reviewing the MRI images, discussing the management of intracranial aneurysms, discussing the expected recovery after ruptured aneurysm, discussing the management of benign essential tremor

## 2025-05-05 ENCOUNTER — OFFICE VISIT (OUTPATIENT)
Dept: NEUROSURGERY | Facility: CLINIC | Age: 60
End: 2025-05-05
Payer: COMMERCIAL

## 2025-05-05 VITALS
BODY MASS INDEX: 22.38 KG/M2 | WEIGHT: 134.3 LBS | DIASTOLIC BLOOD PRESSURE: 68 MMHG | OXYGEN SATURATION: 97 % | HEART RATE: 107 BPM | SYSTOLIC BLOOD PRESSURE: 110 MMHG | TEMPERATURE: 97.7 F | HEIGHT: 65 IN

## 2025-05-05 DIAGNOSIS — Z86.79 HISTORY OF SUBARACHNOID HEMORRHAGE: ICD-10-CM

## 2025-05-05 DIAGNOSIS — I67.1 BRAIN ANEURYSM: Primary | ICD-10-CM

## 2025-05-05 DIAGNOSIS — R25.1 TREMOR: ICD-10-CM

## 2025-05-05 PROCEDURE — 99215 OFFICE O/P EST HI 40 MIN: CPT | Performed by: NEUROLOGICAL SURGERY

## 2025-05-05 RX ORDER — AMLODIPINE BESYLATE 5 MG/1
TABLET ORAL
COMMUNITY
Start: 2025-04-18

## 2025-05-05 RX ORDER — LISINOPRIL AND HYDROCHLOROTHIAZIDE 20; 25 MG/1; MG/1
1 TABLET ORAL DAILY
COMMUNITY
Start: 2025-02-05

## (undated) DEVICE — Device

## (undated) DEVICE — STPCK 3/WY HP M/RA W/OFF/HNDL 1050PSI STRL

## (undated) DEVICE — GLV SURG SENSICARE PI MIC PF SZ7.5 LF STRL

## (undated) DEVICE — ST FLTR IV POSIDYNE W/EXT/TBG 2.7ML

## (undated) DEVICE — SOL NACL 0.9PCT 1000ML

## (undated) DEVICE — ST ACC MICROPUNCTURE STFF .018 ECHO/PLAT/TP 4F/10CM 21G/7CM

## (undated) DEVICE — CVR PROB 96IN LF STRL

## (undated) DEVICE — APPL CHLORAPREP HI/LITE 26ML ORNG

## (undated) DEVICE — TOWL STRL OR PREWSH COTN 17X27IN BLU DISP STRL PK/4

## (undated) DEVICE — PK ANGIO CERBRL RAD 40

## (undated) DEVICE — SHEATH GUIDE CEREBASE DIST/ACC 8F 95CM LNG

## (undated) DEVICE — BASN GW RINGMASTER

## (undated) DEVICE — TBG BR HP FIX BR ROT/LL 96IN DISP STRL

## (undated) DEVICE — DEV TORQ GW .010TO.038IN

## (undated) DEVICE — BG WAST DISPOSABLE DEPOT W/TBG48IN S/COCK SPK1400

## (undated) DEVICE — DEV ANGIO FLOSWITCH HP BX24

## (undated) DEVICE — TRY CATH UROMETER TEMP SENS SIL 16F

## (undated) DEVICE — WIPE INST 3X3IN 2MM BX/20

## (undated) DEVICE — GW AMPLTZ SUPERSTIFF STR .035IN 260CM

## (undated) DEVICE — MANIFLD BLCK 200PSI 2PORT OFF RT

## (undated) DEVICE — ADAPT Y ROT GATEWAY PLS

## (undated) DEVICE — GW GLIDEWIRE STD ANGL .035IN 3X180CM

## (undated) DEVICE — CONTRL DETACH WEB FOR ANEUR EMB SYS YEL

## (undated) DEVICE — SUT SILK 2/0 FS BLK 18IN 685G